# Patient Record
Sex: FEMALE | Race: OTHER | Employment: UNEMPLOYED | URBAN - METROPOLITAN AREA
[De-identification: names, ages, dates, MRNs, and addresses within clinical notes are randomized per-mention and may not be internally consistent; named-entity substitution may affect disease eponyms.]

---

## 2017-01-20 ENCOUNTER — OFFICE VISIT (OUTPATIENT)
Dept: OBGYN CLINIC | Facility: CLINIC | Age: 42
End: 2017-01-20

## 2017-01-20 VITALS
HEIGHT: 60.75 IN | SYSTOLIC BLOOD PRESSURE: 113 MMHG | WEIGHT: 172 LBS | BODY MASS INDEX: 32.9 KG/M2 | HEART RATE: 66 BPM | DIASTOLIC BLOOD PRESSURE: 75 MMHG

## 2017-01-20 DIAGNOSIS — Z01.419 ENCOUNTER FOR GYNECOLOGICAL EXAMINATION WITHOUT ABNORMAL FINDING: Primary | ICD-10-CM

## 2017-01-20 PROCEDURE — 99395 PREV VISIT EST AGE 18-39: CPT | Performed by: OBSTETRICS & GYNECOLOGY

## 2017-01-20 NOTE — PROGRESS NOTES
HPI:   Etelvina Moore is a 46year old female who presents for an annual/pap.        Wt Readings from Last 6 Encounters:  01/20/17 : 172 lb (78.019 kg)  08/12/16 : 175 lb (79.379 kg)  07/14/16 : 178 lb (80.74 kg)  06/16/16 : 178 lb (80.74 kg)  02/01/16 : 174 • Decorative tattoo    • Anemia    • Esophageal reflux 2013          Past Surgical History    HERNIA SURGERY  11/27/13    Comment hiatal hernia     EXCIS PRIMARY GANGLION WRIST      FOREARM/WRIST SURGERY UNLISTED      Comment left wrist surgery    C-SECT tenderness  BREAST: no dominant or suspicious mass  LUNGS: clear to auscultation  CARDIO: RRR without murmur  GI: good BS's,no masses, HSM or tenderness  :introitus is normal,scant discharge,cervix is pink,no adnexal masses or tenderness, PAP was done

## 2017-02-07 ENCOUNTER — TELEPHONE (OUTPATIENT)
Dept: OBGYN CLINIC | Facility: CLINIC | Age: 42
End: 2017-02-07

## 2017-02-07 NOTE — TELEPHONE ENCOUNTER
Patient informed of normal result, and provider instructions,  patient verbalized understanding.  DOUGLAS JOHNSON

## 2017-02-15 RX ORDER — CITALOPRAM 40 MG/1
TABLET ORAL
Qty: 30 TABLET | Refills: 3 | Status: SHIPPED | OUTPATIENT
Start: 2017-02-15 | End: 2017-03-02

## 2017-03-02 ENCOUNTER — OFFICE VISIT (OUTPATIENT)
Dept: INTERNAL MEDICINE CLINIC | Facility: CLINIC | Age: 42
End: 2017-03-02

## 2017-03-02 VITALS
WEIGHT: 175 LBS | BODY MASS INDEX: 33.47 KG/M2 | SYSTOLIC BLOOD PRESSURE: 116 MMHG | HEIGHT: 60.75 IN | TEMPERATURE: 98 F | DIASTOLIC BLOOD PRESSURE: 70 MMHG | HEART RATE: 56 BPM | OXYGEN SATURATION: 97 %

## 2017-03-02 DIAGNOSIS — D50.0 IRON DEFICIENCY ANEMIA DUE TO CHRONIC BLOOD LOSS: Primary | ICD-10-CM

## 2017-03-02 DIAGNOSIS — R63.5 WEIGHT GAIN: ICD-10-CM

## 2017-03-02 DIAGNOSIS — R35.0 INCREASED URINARY FREQUENCY: ICD-10-CM

## 2017-03-02 DIAGNOSIS — Z00.00 ADULT GENERAL MEDICAL EXAM: ICD-10-CM

## 2017-03-02 DIAGNOSIS — R31.9 HEMATURIA: ICD-10-CM

## 2017-03-02 LAB
ALBUMIN SERPL BCP-MCNC: 4.2 G/DL (ref 3.5–4.8)
ALBUMIN/GLOB SERPL: 1.7 {RATIO} (ref 1–2)
ALP SERPL-CCNC: 64 U/L (ref 32–100)
ALT SERPL-CCNC: 39 U/L (ref 14–54)
ANION GAP SERPL CALC-SCNC: 9 MMOL/L (ref 0–18)
APPEARANCE: CLEAR
AST SERPL-CCNC: 24 U/L (ref 15–41)
BASOPHILS # BLD: 0 K/UL (ref 0–0.2)
BASOPHILS NFR BLD: 1 %
BILIRUB SERPL-MCNC: 0.7 MG/DL (ref 0.3–1.2)
BILIRUB UR QL: NEGATIVE
BUN SERPL-MCNC: 18 MG/DL (ref 8–20)
BUN/CREAT SERPL: 25.7 (ref 10–20)
CALCIUM SERPL-MCNC: 9.8 MG/DL (ref 8.5–10.5)
CHLORIDE SERPL-SCNC: 103 MMOL/L (ref 95–110)
CHOLEST SERPL-MCNC: 185 MG/DL (ref 110–200)
CLARITY UR: CLEAR
CO2 SERPL-SCNC: 26 MMOL/L (ref 22–32)
COLOR UR: YELLOW
CREAT SERPL-MCNC: 0.7 MG/DL (ref 0.5–1.5)
EOSINOPHIL # BLD: 0 K/UL (ref 0–0.7)
EOSINOPHIL NFR BLD: 1 %
ERYTHROCYTE [DISTWIDTH] IN BLOOD BY AUTOMATED COUNT: 14.4 % (ref 11–15)
FERRITIN SERPL IA-MCNC: 33 NG/ML (ref 11–307)
GLOBULIN PLAS-MCNC: 2.5 G/DL (ref 2.5–3.7)
GLUCOSE SERPL-MCNC: 79 MG/DL (ref 70–99)
GLUCOSE UR-MCNC: NEGATIVE MG/DL
HCT VFR BLD AUTO: 40.9 % (ref 35–48)
HDLC SERPL-MCNC: 48 MG/DL
HGB BLD-MCNC: 13.3 G/DL (ref 12–16)
HGB UR QL STRIP.AUTO: NEGATIVE
IRON SATN MFR SERPL: 16 % (ref 15–50)
IRON SERPL-MCNC: 55 MCG/DL (ref 28–170)
KETONES UR-MCNC: NEGATIVE MG/DL
LDLC SERPL CALC-MCNC: 109 MG/DL (ref 0–99)
LEUKOCYTE ESTERASE UR QL STRIP.AUTO: NEGATIVE
LYMPHOCYTES # BLD: 1.8 K/UL (ref 1–4)
LYMPHOCYTES NFR BLD: 24 %
MCH RBC QN AUTO: 28.6 PG (ref 27–32)
MCHC RBC AUTO-ENTMCNC: 32.6 G/DL (ref 32–37)
MCV RBC AUTO: 87.6 FL (ref 80–100)
MONOCYTES # BLD: 0.5 K/UL (ref 0–1)
MONOCYTES NFR BLD: 6 %
MULTISTIX LOT#: NORMAL NUMERIC
NEUTROPHILS # BLD AUTO: 5.2 K/UL (ref 1.8–7.7)
NEUTROPHILS NFR BLD: 68 %
NITRITE UR QL STRIP.AUTO: NEGATIVE
NONHDLC SERPL-MCNC: 137 MG/DL
OSMOLALITY UR CALC.SUM OF ELEC: 287 MOSM/KG (ref 275–295)
PH UR: 7 [PH] (ref 5–8)
PH, URINE: 7 (ref 4.5–8)
PLATELET # BLD AUTO: 226 K/UL (ref 140–400)
PMV BLD AUTO: 9.1 FL (ref 7.4–10.3)
POTASSIUM SERPL-SCNC: 4.5 MMOL/L (ref 3.3–5.1)
PROT SERPL-MCNC: 6.7 G/DL (ref 5.9–8.4)
PROT UR-MCNC: NEGATIVE MG/DL
RBC # BLD AUTO: 4.67 M/UL (ref 3.7–5.4)
SODIUM SERPL-SCNC: 138 MMOL/L (ref 136–144)
SP GR UR STRIP: 1.02 (ref 1–1.03)
SPECIFIC GRAVITY: 1.02 (ref 1–1.03)
TIBC SERPL-MCNC: 347 MCG/DL (ref 228–428)
TRANSFERRIN SERPL-MCNC: 263 MG/DL (ref 192–382)
TRIGL SERPL-MCNC: 138 MG/DL (ref 1–149)
URINE-COLOR: YELLOW
UROBILINOGEN UR STRIP-ACNC: <2
UROBILINOGEN,SEMI-QN: 0.2 MG/DL (ref 0–1.9)
VIT B12 SERPL-MCNC: 572 PG/ML (ref 181–914)
VIT C UR-MCNC: NEGATIVE MG/DL
WBC # BLD AUTO: 7.6 K/UL (ref 4–11)

## 2017-03-02 PROCEDURE — 81002 URINALYSIS NONAUTO W/O SCOPE: CPT | Performed by: INTERNAL MEDICINE

## 2017-03-02 PROCEDURE — 36415 COLL VENOUS BLD VENIPUNCTURE: CPT | Performed by: INTERNAL MEDICINE

## 2017-03-02 PROCEDURE — 99395 PREV VISIT EST AGE 18-39: CPT | Performed by: INTERNAL MEDICINE

## 2017-03-02 RX ORDER — CITALOPRAM 40 MG/1
TABLET ORAL
Qty: 30 TABLET | Refills: 3 | Status: SHIPPED | OUTPATIENT
Start: 2017-03-02 | End: 2017-10-24

## 2017-03-02 NOTE — PATIENT INSTRUCTIONS
ASSESSMENT/PLAN:   Iron deficiency anemia due to chronic blood loss  (primary encounter diagnosis) Check blood. Adult general medical exam Check urine and blood. Increased urinary frequency Check blood and urine and U/S.      Weight gain discussed w

## 2017-03-02 NOTE — PROGRESS NOTES
HPI:    Patient ID: Jovanni Ordonez is a 46year old female.     HPI  Jovanni Ordonez is a 46year old female who presents for a complete physical exam.   HPI:   Patient presents with:  Physical     REASON FOR VISIT:    Jovanni Ordonez is a 46year old female who pr two weeks)?: Not at all    PHQ-2 SCORE: 0        PREVENTATIVE SERVICES  INDICATIONS AND SCHEDULE Recommendation Internal Lab or Procedure External Lab or Procedure   Breast Cancer Screening   Every 2 yrs age 54-69 There are no preventive care reminders to Outpatient Prescriptions:  Mirabegron ER 25 MG Oral Tablet 24 Hr Take 25 mg by mouth daily. Disp:  Rfl:    Citalopram Hydrobromide 40 MG Oral Tab TAKE 1 TABLET (40 MG TOTAL) BY MOUTH NIGHTLY.  Disp: 30 tablet Rfl: 3   MetFORMIN HCl 500 MG Oral Tab 1/2 tab e VACCINATIONS - Influenza, Pneumococcal, Zoster, Tetanus     Immunization History   Administered Date(s) Administered   • Influenza 10/03/2013       Influenza Annually   Pneumococcal if high risk   Td/Tdap once then every 10 years   HPV Females 11-26: 3 dos light-headedness, numbness and headaches. Psychiatric/Behavioral: Negative for suicidal ideas, hallucinations, behavioral problems, confusion, sleep disturbance, self-injury, dysphoric mood, decreased concentration and agitation.  The patient is not nervo mucous membranes are normal. Mucous membranes are not pale, not dry and not cyanotic. No oral lesions. No trismus in the jaw. No dental abscesses, uvula swelling or lacerations.  No oropharyngeal exudate, posterior oropharyngeal edema, posterior oropharynge distension, no fluid wave, no ascites and no mass. There is no hepatosplenomegaly, splenomegaly or hepatomegaly. There is no tenderness. There is no rigidity, no rebound, no guarding and no CVA tenderness. Musculoskeletal: She exhibits no edema.    Funmilayo Harhome [E]  Urinalysis, Routine [E]  POC Urinalysis, Manual Dip without microscopy [11129]  Urine Culture, Routine [E]    Meds This Visit:    Signed Prescriptions Disp Refills    Citalopram Hydrobromide 40 MG Oral Tab 30 tablet 3      Sig: TAKE 1 TABLET (40 MG TO

## 2017-03-06 NOTE — PROGRESS NOTES
Quick Note:    CBC Normal (white blood cells and red blood cells and platelets), iron levels and iron storage is good. B 12 is good. CMP Normal (electrolytes, sugar, kidney and liver functions),   Lipid (choilesterol) is better,   U/A and UCx.  Is Nl.  __

## 2017-03-15 ENCOUNTER — HOSPITAL ENCOUNTER (OUTPATIENT)
Dept: ULTRASOUND IMAGING | Facility: HOSPITAL | Age: 42
Discharge: HOME | End: 2017-03-15
Attending: INTERNAL MEDICINE
Payer: COMMERCIAL

## 2017-03-15 DIAGNOSIS — R35.0 INCREASED URINARY FREQUENCY: ICD-10-CM

## 2017-03-15 PROCEDURE — 76770 US EXAM ABDO BACK WALL COMP: CPT

## 2017-05-01 ENCOUNTER — TELEPHONE (OUTPATIENT)
Dept: OBGYN CLINIC | Facility: CLINIC | Age: 42
End: 2017-05-01

## 2017-05-01 RX ORDER — METRONIDAZOLE 7.5 MG/G
1 GEL VAGINAL 2 TIMES DAILY
Qty: 70 G | Refills: 0 | Status: SHIPPED | OUTPATIENT
Start: 2017-05-01 | End: 2017-05-06

## 2017-05-01 NOTE — TELEPHONE ENCOUNTER
Pt voices having yellowish, vaginal, discharge \"on and off\" for the past couple of weeks. No odor. No vaginal irritation or itching. Pt has tried over the counter meds, with no relief from symptom. Please advise.

## 2017-05-01 NOTE — TELEPHONE ENCOUNTER
Pt called. No answer, but permission received per pt under FYI that a message may be left regarding any treatments. Left message that asj ordered Metrogel 0.75% per vagina twice a day for 5 days.  Also, left message that pt is to make appointment with offic

## 2017-05-01 NOTE — TELEPHONE ENCOUNTER
Yellowing vaginal discharge more c/w bacterial vaginosis,  May send in rx for Metrogel 0.75%, disp 1 tube, sig one application per vagina bid for 5 days, if sx still there in 5 days, then make appt in office.

## 2017-05-01 NOTE — TELEPHONE ENCOUNTER
Per the pt she has had a yeast infection off and on for about a month. The pt states that over the counter medication is not helping, and she is requesting that something be sent in. Please advise.

## 2017-05-02 ENCOUNTER — TELEPHONE (OUTPATIENT)
Dept: INTERNAL MEDICINE CLINIC | Facility: CLINIC | Age: 42
End: 2017-05-02

## 2017-05-11 ENCOUNTER — TELEPHONE (OUTPATIENT)
Dept: INTERNAL MEDICINE CLINIC | Facility: CLINIC | Age: 42
End: 2017-05-11

## 2017-05-11 ENCOUNTER — OFFICE VISIT (OUTPATIENT)
Dept: INTERNAL MEDICINE CLINIC | Facility: CLINIC | Age: 42
End: 2017-05-11

## 2017-05-11 VITALS
WEIGHT: 176.63 LBS | HEART RATE: 64 BPM | HEIGHT: 61 IN | DIASTOLIC BLOOD PRESSURE: 74 MMHG | TEMPERATURE: 98 F | SYSTOLIC BLOOD PRESSURE: 104 MMHG | OXYGEN SATURATION: 98 % | BODY MASS INDEX: 33.35 KG/M2

## 2017-05-11 DIAGNOSIS — R06.02 SOB (SHORTNESS OF BREATH): ICD-10-CM

## 2017-05-11 DIAGNOSIS — E78.2 MIXED HYPERLIPIDEMIA: ICD-10-CM

## 2017-05-11 DIAGNOSIS — R00.2 PALPITATIONS: ICD-10-CM

## 2017-05-11 DIAGNOSIS — D50.0 IRON DEFICIENCY ANEMIA DUE TO CHRONIC BLOOD LOSS: Primary | ICD-10-CM

## 2017-05-11 DIAGNOSIS — R63.5 WEIGHT GAIN: ICD-10-CM

## 2017-05-11 PROCEDURE — 99212 OFFICE O/P EST SF 10 MIN: CPT | Performed by: INTERNAL MEDICINE

## 2017-05-11 PROCEDURE — 36415 COLL VENOUS BLD VENIPUNCTURE: CPT | Performed by: INTERNAL MEDICINE

## 2017-05-11 PROCEDURE — 99214 OFFICE O/P EST MOD 30 MIN: CPT | Performed by: INTERNAL MEDICINE

## 2017-05-11 NOTE — PROGRESS NOTES
HPI:    Patient ID: Pasquale Ashton is a 46year old female.     Anemia  Pertinent negatives include no abdominal pain, chest pain, chills, congestion, coughing, diaphoresis, fatigue, fever, headaches, myalgias, nausea, numbness, rash, sore throat, vomiting or 104/74  03/02/17 : 116/70  01/20/17 : 113/75    Labs:     Lab Results  Component Value Date/Time   GLU 79 03/02/2017 11:15 AM    03/02/2017 11:15 AM   K 4.5 03/02/2017 11:15 AM    03/02/2017 11:15 AM   CO2 26 03/02/2017 11:15 AM   CREATSERUM 0. hallucinations, behavioral problems, confusion, sleep disturbance, self-injury, dysphoric mood, decreased concentration and agitation. The patient is not nervous/anxious and is not hyperactive. All other systems reviewed and are negative.           Baljit Smith Smokeless Status: Never Used                        Alcohol Use: No                 PHYSICAL EXAM:    Physical Exam   Constitutional: She is oriented to person, place, and time. She appears well-developed and well-nourished. No distress.    HENT: pulses and normal pulses. Pulses:       Carotid pulses are 2+ on the right side, and 2+ on the left side. Radial pulses are 2+ on the right side, and 2+ on the left side.         Dorsalis pedis pulses are 2+ on the right side, and 2+ on the left si bariatrics. R ankle pain after Sx.        Orders Placed This Encounter  CBC W Differential W Platelet [E]  Comp Metabolic Panel (14) [E]  Ferritin [E]  TSH W Reflex To Free T4 [E]    Meds This Visit:  No prescriptions requested or ordered in this encoun

## 2017-05-11 NOTE — PATIENT INSTRUCTIONS
ASSESSMENT/PLAN:   Iron deficiency anemia due to chronic blood loss  (primary encounter diagnosis) Check blood. Mixed hyperlipidemia Check blood. Sob (shortness of breath) ? Etiology. Check PFT's.      Palpitations Check records of cardiologist. Wendy Mariano

## 2017-05-12 NOTE — TELEPHONE ENCOUNTER
Called patient, left voice message for patient to return call. PFT ordered. Please give patient information from order to call to schedule testing.

## 2017-05-15 ENCOUNTER — OFFICE VISIT (OUTPATIENT)
Dept: OBGYN CLINIC | Facility: CLINIC | Age: 42
End: 2017-05-15

## 2017-05-15 ENCOUNTER — TELEPHONE (OUTPATIENT)
Dept: OBGYN CLINIC | Facility: CLINIC | Age: 42
End: 2017-05-15

## 2017-05-15 VITALS — SYSTOLIC BLOOD PRESSURE: 109 MMHG | HEART RATE: 60 BPM | DIASTOLIC BLOOD PRESSURE: 71 MMHG

## 2017-05-15 DIAGNOSIS — N76.0 VAGINITIS AND VULVOVAGINITIS: ICD-10-CM

## 2017-05-15 DIAGNOSIS — N89.8 VAGINAL DISCHARGE: Primary | ICD-10-CM

## 2017-05-15 PROCEDURE — 99213 OFFICE O/P EST LOW 20 MIN: CPT | Performed by: OBSTETRICS & GYNECOLOGY

## 2017-05-15 RX ORDER — FLUCONAZOLE 150 MG/1
150 TABLET ORAL ONCE
Qty: 4 TABLET | Refills: 1 | Status: SHIPPED | OUTPATIENT
Start: 2017-05-15 | End: 2017-05-15

## 2017-05-15 NOTE — TELEPHONE ENCOUNTER
Pt saw Dr. Eder Weiss and is at pharmacy now; verified correct pharmacy ans states nothing is there. She is req to willy w nurse.

## 2017-05-15 NOTE — PROGRESS NOTES
HPI:   Karyn Espinal is a 46year old female who presents for a hx of vaginal discharge and irritation.   Pt    Wt Readings from Last 6 Encounters:  05/11/17 : 176 lb 9.6 oz (80.105 kg)  03/02/17 : 175 lb (79.379 kg)  01/20/17 : 172 lb (78.019 kg)  08/12/16 History    HERNIA SURGERY  13    Comment hiatal hernia     EXCIS PRIMARY GANGLION WRIST      FOREARM/WRIST SURGERY UNLISTED      Comment left wrist surgery          Comment x 3    OTHER SURGICAL HISTORY      Comment Lasik    OTHER SURGICAL H auscultation  CARDIO: RRR without murmur  GI: good BS's,no masses, HSM or tenderness  :introitus is normal,scant discharge,cervix is pink,no adnexal masses or tenderness, PAP was done     MUSCULOSKELETAL: back is not tender,FROM of the back  EXTREMITIES:

## 2017-05-15 NOTE — TELEPHONE ENCOUNTER
Pt at pharmacy now. Pharmacy did not received order for Diflucan. ASJ informed and order was placed for Diflucan and pt informed she is to take Monistat OTC.

## 2017-05-16 NOTE — PROGRESS NOTES
Quick Note:    CBC Normal (white blood cells and red blood cells and platelets),   CMP Normal (electrolytes, sugar, kidney and liver functions),   Thyroid is good. Iron storage is good.     ______

## 2017-05-17 ENCOUNTER — HOSPITAL ENCOUNTER (OUTPATIENT)
Dept: RESPIRATORY THERAPY | Facility: HOSPITAL | Age: 42
Discharge: HOME | End: 2017-05-17
Attending: INTERNAL MEDICINE
Payer: COMMERCIAL

## 2017-05-17 DIAGNOSIS — R06.02 SOB (SHORTNESS OF BREATH): Primary | ICD-10-CM

## 2017-05-17 PROCEDURE — 94060 EVALUATION OF WHEEZING: CPT | Performed by: INTERNAL MEDICINE

## 2017-05-17 PROCEDURE — 94729 DIFFUSING CAPACITY: CPT | Performed by: INTERNAL MEDICINE

## 2017-05-17 PROCEDURE — 94726 PLETHYSMOGRAPHY LUNG VOLUMES: CPT | Performed by: INTERNAL MEDICINE

## 2017-05-25 NOTE — PROCEDURES
[facility]    Patient's Name Brisa Mckenna MRN [id number]    1975 Pulmonologist Nathalie Durant MD   [facility] RESPIRATORY THERAPY PCP Yi Ulrich.  Melissa Mg MD     IMPRESSION:    The PFTs are Abnormal.    The spirometry a

## 2017-05-25 NOTE — ADDENDUM NOTE
Encounter addended by: Lyn Luque MD on: 5/25/2017  1:25 PM<BR>     Documentation filed: Charges VN, Notes Section

## 2017-05-30 ENCOUNTER — TELEPHONE (OUTPATIENT)
Dept: OBGYN CLINIC | Facility: CLINIC | Age: 42
End: 2017-05-30

## 2017-05-30 ENCOUNTER — OFFICE VISIT (OUTPATIENT)
Dept: INTERNAL MEDICINE CLINIC | Facility: CLINIC | Age: 42
End: 2017-05-30

## 2017-05-30 ENCOUNTER — TELEPHONE (OUTPATIENT)
Dept: INTERNAL MEDICINE CLINIC | Facility: CLINIC | Age: 42
End: 2017-05-30

## 2017-05-30 VITALS
HEIGHT: 61 IN | WEIGHT: 177.81 LBS | HEART RATE: 64 BPM | DIASTOLIC BLOOD PRESSURE: 70 MMHG | OXYGEN SATURATION: 97 % | TEMPERATURE: 98 F | SYSTOLIC BLOOD PRESSURE: 108 MMHG | BODY MASS INDEX: 33.57 KG/M2

## 2017-05-30 DIAGNOSIS — E78.2 MIXED HYPERLIPIDEMIA: ICD-10-CM

## 2017-05-30 DIAGNOSIS — R06.00 DOE (DYSPNEA ON EXERTION): ICD-10-CM

## 2017-05-30 DIAGNOSIS — L02.91 ABSCESS: ICD-10-CM

## 2017-05-30 DIAGNOSIS — D50.0 IRON DEFICIENCY ANEMIA DUE TO CHRONIC BLOOD LOSS: Primary | ICD-10-CM

## 2017-05-30 PROCEDURE — 99214 OFFICE O/P EST MOD 30 MIN: CPT | Performed by: INTERNAL MEDICINE

## 2017-05-30 PROCEDURE — 99212 OFFICE O/P EST SF 10 MIN: CPT | Performed by: INTERNAL MEDICINE

## 2017-05-30 RX ORDER — CLINDAMYCIN HYDROCHLORIDE 300 MG/1
300 CAPSULE ORAL 4 TIMES DAILY
Qty: 40 CAPSULE | Refills: 0 | Status: SHIPPED | OUTPATIENT
Start: 2017-05-30 | End: 2017-06-09

## 2017-05-30 NOTE — TELEPHONE ENCOUNTER
----- Message from Leonarda Interiano MD sent at 5/25/2017 11:45 AM CDT -----  Please inform,  Candida yeast noted on screen, pt may have already treated but if symptomatic,  May call in diflucan 150 mg, disp 2, sig one tab po q24 hr X 2, or pt may use mo

## 2017-05-30 NOTE — TELEPHONE ENCOUNTER
The is returning a nurse's call. The pt state that a detailed v/m can be left at [phone]. Please advise.

## 2017-05-30 NOTE — TELEPHONE ENCOUNTER
Pt called and informed that her her test on 5/1517  came back positive for yeast. Pt verbalize she is aware of this and asj already ordered medication for her and that she has no symptoms of yeast at this time.

## 2017-05-31 NOTE — PATIENT INSTRUCTIONS
ASSESSMENT/PLAN:   Iron deficiency anemia due to chronic blood loss  (primary encounter diagnosis) Check blood. Mixed hyperlipidemia Stable 3-17. Abscess Start antibiotics ASAP and take as directed with food til done.  Take probiotics while on antib

## 2017-05-31 NOTE — PROGRESS NOTES
HPI:    Patient ID: Brisa Mckenna is a 46year old female. Rash  This is a new problem. The current episode started 1 to 4 weeks ago. The problem is unchanged. Location: L breast.  noticed. The rash is characterized by redness and swelling.  She w HENT: Negative for congestion, dental problem, drooling, ear discharge, ear pain, facial swelling, hearing loss, mouth sores, nosebleeds, postnasal drip, rhinorrhea, sinus pressure, sneezing, sore throat, tinnitus, trouble swallowing and voice change.     E LLQ. Colonoscopy : NL (10-14-15) except hemorrhoids.     • Amenorrhea    • Anxiety    • Urinary incontinence    • Decorative tattoo    • Anemia    • Esophageal reflux 2013          Past Surgical History    HERNIA SURGERY  11/27/13    Comment hiatal hernia Pulmonary/Chest: Effort normal and breath sounds normal. No accessory muscle usage. No apnea, no tachypnea and no bradypnea. No respiratory distress. She has no decreased breath sounds. She has no wheezes. She has no rhonchi. She has no rales.  She exhibits Abscess Start antibiotics ASAP and take as directed with food til done. Take probiotics while on antibiotics if can to prevent yeast infections. Stop cholesterol medicines while on antibiotics. Increase fluids.  Use extra form of protection while on antibio

## 2017-06-06 ENCOUNTER — OFFICE VISIT (OUTPATIENT)
Dept: INTERNAL MEDICINE CLINIC | Facility: CLINIC | Age: 42
End: 2017-06-06

## 2017-06-06 VITALS
DIASTOLIC BLOOD PRESSURE: 66 MMHG | WEIGHT: 176 LBS | HEIGHT: 61 IN | HEART RATE: 72 BPM | SYSTOLIC BLOOD PRESSURE: 108 MMHG | OXYGEN SATURATION: 98 % | TEMPERATURE: 99 F | BODY MASS INDEX: 33.23 KG/M2

## 2017-06-06 DIAGNOSIS — E78.2 MIXED HYPERLIPIDEMIA: ICD-10-CM

## 2017-06-06 DIAGNOSIS — R22.2 MASS IN CHEST: ICD-10-CM

## 2017-06-06 DIAGNOSIS — L02.91 ABSCESS: ICD-10-CM

## 2017-06-06 DIAGNOSIS — D50.0 IRON DEFICIENCY ANEMIA DUE TO CHRONIC BLOOD LOSS: Primary | ICD-10-CM

## 2017-06-06 DIAGNOSIS — M54.6 ACUTE BILATERAL THORACIC BACK PAIN: ICD-10-CM

## 2017-06-06 PROCEDURE — 99214 OFFICE O/P EST MOD 30 MIN: CPT | Performed by: INTERNAL MEDICINE

## 2017-06-06 PROCEDURE — 99212 OFFICE O/P EST SF 10 MIN: CPT | Performed by: INTERNAL MEDICINE

## 2017-06-06 RX ORDER — CYCLOBENZAPRINE HCL 10 MG
10 TABLET ORAL NIGHTLY
Qty: 7 TABLET | Refills: 0 | Status: SHIPPED | OUTPATIENT
Start: 2017-06-06 | End: 2017-06-22 | Stop reason: ALTCHOICE

## 2017-06-06 NOTE — PATIENT INSTRUCTIONS
ASSESSMENT/PLAN:   Iron deficiency anemia due to chronic blood loss  (primary encounter diagnosis)    Mixed hyperlipidemia    Abscess Improving but not too quickly. Check mammogram. Continue self breast exam every month.      Acute bilateral thoracic back p

## 2017-06-06 NOTE — PROGRESS NOTES
HPI:    Patient ID: Rosanne Salvador is a 46year old female. Back Pain  This is a new problem. The current episode started 1 to 4 weeks ago. The problem occurs constantly. The problem has been gradually worsening since onset.  The pain is present in the tho Clindamycin HCl 300 MG Oral Cap Take 1 capsule (300 mg total) by mouth 4 (four) times daily. Disp: 40 capsule Rfl: 0   Mirabegron ER 25 MG Oral Tablet 24 Hr Take 25 mg by mouth daily.  Disp:  Rfl:    Citalopram Hydrobromide 40 MG Oral Tab TAKE 1 TABLET (40 Constitutional: She is oriented to person, place, and time. She appears well-developed and well-nourished. No distress. Neck: Trachea normal and phonation normal. No thyroid mass and no thyromegaly present.    Cardiovascular: Normal rate, regular rhythm, Thoracic back: She exhibits decreased range of motion, pain and spasm. She exhibits no tenderness, no bony tenderness, no swelling, no edema, no deformity, no laceration and normal pulse.    Lymphadenopathy:        Head (right side): No submental, no s Signed Prescriptions Disp Refills    Diclofenac Sodium 50 MG Oral Tab EC 14 tablet 0      Sig: Take 1 tablet (50 mg total) by mouth 2 (two) times daily.       Cyclobenzaprine HCl 10 MG Oral Tab 7 tablet 0      Sig: Take 1 tablet (10 mg total) by mouth night

## 2017-06-08 ENCOUNTER — HOSPITAL ENCOUNTER (OUTPATIENT)
Dept: ULTRASOUND IMAGING | Facility: HOSPITAL | Age: 42
Discharge: HOME | End: 2017-06-08
Attending: ORTHOPAEDIC SURGERY
Payer: COMMERCIAL

## 2017-06-08 DIAGNOSIS — M76.71 PERONEAL TENDINITIS, RIGHT LEG: ICD-10-CM

## 2017-06-08 PROCEDURE — 76882 US LMTD JT/FCL EVL NVASC XTR: CPT | Performed by: ORTHOPAEDIC SURGERY

## 2017-06-13 ENCOUNTER — HOSPITAL ENCOUNTER (OUTPATIENT)
Dept: ULTRASOUND IMAGING | Facility: HOSPITAL | Age: 42
Discharge: HOME | End: 2017-06-13
Attending: INTERNAL MEDICINE
Payer: COMMERCIAL

## 2017-06-13 ENCOUNTER — HOSPITAL ENCOUNTER (OUTPATIENT)
Dept: MAMMOGRAPHY | Facility: HOSPITAL | Age: 42
Discharge: HOME | End: 2017-06-13
Attending: INTERNAL MEDICINE
Payer: COMMERCIAL

## 2017-06-13 DIAGNOSIS — R22.2 MASS IN CHEST: ICD-10-CM

## 2017-06-13 PROCEDURE — 76642 ULTRASOUND BREAST LIMITED: CPT | Performed by: INTERNAL MEDICINE

## 2017-06-13 PROCEDURE — 77066 DX MAMMO INCL CAD BI: CPT | Performed by: INTERNAL MEDICINE

## 2017-06-14 ENCOUNTER — TELEPHONE (OUTPATIENT)
Dept: INTERNAL MEDICINE CLINIC | Facility: CLINIC | Age: 42
End: 2017-06-14

## 2017-06-14 NOTE — TELEPHONE ENCOUNTER
Received call from patient regarding lesion to breast. Per patient it looks bigger, with redness, pain, denies any drainage. Asking if she needs to see derm? Still taking antibiotic. Please advise.

## 2017-06-15 NOTE — TELEPHONE ENCOUNTER
Spoke with patient, informed to see Dr. Cr Ortiz regarding lesion to breast. Information given, instructed to call and make appointment. Patient verbalized understanding.

## 2017-06-22 ENCOUNTER — OFFICE VISIT (OUTPATIENT)
Dept: INTERNAL MEDICINE CLINIC | Facility: CLINIC | Age: 42
End: 2017-06-22

## 2017-06-22 ENCOUNTER — HOSPITAL ENCOUNTER (OUTPATIENT)
Dept: GENERAL RADIOLOGY | Facility: HOSPITAL | Age: 42
Discharge: HOME | End: 2017-06-22
Attending: INTERNAL MEDICINE
Payer: COMMERCIAL

## 2017-06-22 ENCOUNTER — TELEPHONE (OUTPATIENT)
Dept: PULMONOLOGY | Facility: CLINIC | Age: 42
End: 2017-06-22

## 2017-06-22 VITALS
BODY MASS INDEX: 33.04 KG/M2 | OXYGEN SATURATION: 96 % | DIASTOLIC BLOOD PRESSURE: 65 MMHG | SYSTOLIC BLOOD PRESSURE: 105 MMHG | HEART RATE: 79 BPM | HEIGHT: 61 IN | TEMPERATURE: 98 F | WEIGHT: 175 LBS

## 2017-06-22 DIAGNOSIS — G89.29 CHRONIC MIDLINE THORACIC BACK PAIN: ICD-10-CM

## 2017-06-22 DIAGNOSIS — M54.6 CHRONIC MIDLINE THORACIC BACK PAIN: ICD-10-CM

## 2017-06-22 DIAGNOSIS — E78.2 MIXED HYPERLIPIDEMIA: ICD-10-CM

## 2017-06-22 DIAGNOSIS — D50.0 IRON DEFICIENCY ANEMIA DUE TO CHRONIC BLOOD LOSS: Primary | ICD-10-CM

## 2017-06-22 DIAGNOSIS — R06.00 DOE (DYSPNEA ON EXERTION): ICD-10-CM

## 2017-06-22 PROBLEM — R06.09 DOE (DYSPNEA ON EXERTION): Status: ACTIVE | Noted: 2017-06-22

## 2017-06-22 PROCEDURE — 99212 OFFICE O/P EST SF 10 MIN: CPT | Performed by: INTERNAL MEDICINE

## 2017-06-22 PROCEDURE — 72072 X-RAY EXAM THORAC SPINE 3VWS: CPT | Performed by: INTERNAL MEDICINE

## 2017-06-22 PROCEDURE — 99214 OFFICE O/P EST MOD 30 MIN: CPT | Performed by: INTERNAL MEDICINE

## 2017-06-22 RX ORDER — METHYLPREDNISOLONE 4 MG/1
TABLET ORAL
Qty: 1 KIT | Refills: 0 | Status: SHIPPED | OUTPATIENT
Start: 2017-06-22 | End: 2017-07-05 | Stop reason: ALTCHOICE

## 2017-06-22 RX ORDER — MONTELUKAST SODIUM 10 MG/1
10 TABLET ORAL NIGHTLY
Qty: 30 TABLET | Refills: 3 | Status: SHIPPED | OUTPATIENT
Start: 2017-06-22 | End: 2017-07-18 | Stop reason: ALTCHOICE

## 2017-06-22 RX ORDER — OMEPRAZOLE 40 MG/1
40 CAPSULE, DELAYED RELEASE ORAL DAILY
Qty: 30 CAPSULE | Refills: 0 | Status: SHIPPED | OUTPATIENT
Start: 2017-06-22 | End: 2017-07-18 | Stop reason: ALTCHOICE

## 2017-06-22 NOTE — TELEPHONE ENCOUNTER
Dr. Stevie Moreno are you will to double book pt before 7/5? New pt SOB referred by Dr. Lizet Medina.

## 2017-06-22 NOTE — PROGRESS NOTES
HPI:    Patient ID: Roseline Richardson is a 46year old female. Low Back Pain  This is a chronic problem. The current episode started more than 1 month ago. The problem occurs constantly. The problem is unchanged. The pain is present in the thoracic spine.  Harrison Pimentel weight loss, diaphoresis, activity change, appetite change, fatigue and unexpected weight change. Respiratory: Positive for shortness of breath. Negative for apnea, cough, choking, chest tightness, wheezing and stridor.     Cardiovascular: Negative for ch except hemorrhoids.     • Amenorrhea    • Anxiety    • Urinary incontinence    • Decorative tattoo    • Anemia    • Esophageal reflux 2013          Past Surgical History    HERNIA SURGERY  11/27/13    Comment hiatal hernia     EXCIS PRIMARY GANGLION WRIST effusion. No hemotympanum. No decreased hearing is noted. Nose: Nose normal. No mucosal edema, rhinorrhea, nose lacerations, sinus tenderness, nasal deformity or nasal septal hematoma. No epistaxis. No foreign bodies.  Right sinus exhibits no maxillary s spasm, normal pulse and normal strength. Thoracic back: She exhibits decreased range of motion, tenderness, pain and spasm. She exhibits no bony tenderness, no swelling, no edema, no deformity, no laceration and normal pulse.    Lymphadenopathy: abnormal   Lateral Bend Left: abnormal   Rotation Right: abnormal   Rotation Left: abnormal     Tests   Straight leg raise right: positive  Straight leg raise left: negative                 ASSESSMENT/PLAN:   Iron deficiency anemia due to chronic blood los

## 2017-06-22 NOTE — PATIENT INSTRUCTIONS
ASSESSMENT/PLAN:   Iron deficiency anemia due to chronic blood loss  (primary encounter diagnosis) Stable. Mixed hyperlipidemia Stable. Boss (dyspnea on exertion) F/U pulmonary. Try medrol with food. Take omperazole 40 mg predinner.  Discussed with smiley

## 2017-06-27 ENCOUNTER — OFFICE VISIT (OUTPATIENT)
Dept: SURGERY | Facility: CLINIC | Age: 42
End: 2017-06-27

## 2017-06-27 VITALS
SYSTOLIC BLOOD PRESSURE: 130 MMHG | TEMPERATURE: 98 F | WEIGHT: 176 LBS | BODY MASS INDEX: 33.23 KG/M2 | DIASTOLIC BLOOD PRESSURE: 76 MMHG | RESPIRATION RATE: 18 BRPM | HEIGHT: 61 IN | OXYGEN SATURATION: 100 % | HEART RATE: 81 BPM

## 2017-06-27 DIAGNOSIS — N60.82 SEBACEOUS CYST OF SKIN OF LEFT BREAST: Primary | ICD-10-CM

## 2017-06-27 PROCEDURE — 99244 OFF/OP CNSLTJ NEW/EST MOD 40: CPT | Performed by: SURGERY

## 2017-06-29 NOTE — PROGRESS NOTES
Breast Surgery New Patient Consultation    This is the first visit for this 38year old woman, referred by Dr. Claudine Leyva, who presents for evaluation of Left breast mass.     History of Present Illness:   Ms. Paresh Klein is a 38year old woman who presents wi Comment: hiatal hernia   13: HERNIA SURGERY  No date: OTHER SURGICAL HISTORY      Comment: Lasik  No date: OTHER SURGICAL HISTORY      Comment: L wrist sx  2014: TUBAL LIGATION  2014: TUBAL LIGATION    Gynecological History:  Pt is a   Pt was 2 color blindness. The patient denies hearing loss, ringing in the ears, ear drainage, earaches, nasal congestion, nose bleeds, snoring, pain in mouth/throat, hoarseness, change in voice, facial trauma.     Respiratory:  The patient denies chronic cough, phle of despair. Endocrine: There is no history of poor/slow wound healing, weight loss/gain, fertility or hormone problems, cold intolerance, thyroid disease. Allergic/Immunologic:  There is no history of hives, hay fever, angioedema or anaphylaxis. Nodes:  The supraclavicular, axillary and cervical regions are free of significant lymphadenopathy. Back: There is no vertebral column tenderness. Skin: The skin appears normal. There are no suspicious appearing rashes or lesions. Extremities:  The

## 2017-07-05 ENCOUNTER — OFFICE VISIT (OUTPATIENT)
Dept: PULMONOLOGY | Facility: CLINIC | Age: 42
End: 2017-07-05

## 2017-07-05 VITALS
RESPIRATION RATE: 18 BRPM | HEIGHT: 61 IN | SYSTOLIC BLOOD PRESSURE: 110 MMHG | HEART RATE: 72 BPM | BODY MASS INDEX: 33.76 KG/M2 | DIASTOLIC BLOOD PRESSURE: 73 MMHG | WEIGHT: 178.81 LBS | OXYGEN SATURATION: 98 %

## 2017-07-05 DIAGNOSIS — R06.00 DYSPNEA, UNSPECIFIED TYPE: Primary | ICD-10-CM

## 2017-07-05 PROCEDURE — 99212 OFFICE O/P EST SF 10 MIN: CPT | Performed by: INTERNAL MEDICINE

## 2017-07-05 PROCEDURE — 99244 OFF/OP CNSLTJ NEW/EST MOD 40: CPT | Performed by: INTERNAL MEDICINE

## 2017-07-05 NOTE — PATIENT INSTRUCTIONS
A prior authorization is necessary for CT & Sleep Study, [facility] (p. #[phone]) will obtain a prior auth, and notify you when to proceed w/ scheduling of tests. Thank you.

## 2017-07-05 NOTE — PROGRESS NOTES
[facility] CLINIC [place] MEDICAL [facility], [facility], [facility]    Report of Consultation    Brisaangel Mckenna Patient Status:  Outpatient    1975 MRN [id number]   Location [address] [facility] UNLISTED      Comment: left wrist surgery  : HEMORRHOIDECTOMY      Comment: internal and anal skin tag removeal. Benign.    11/27/13: HERNIA SURGERY      Comment: hiatal hernia   11/27/13: HERNIA SURGERY  No date: OTHER SURGICAL HISTORY      Comment: 05/11/2017    05/11/2017   CO2 24 05/11/2017   GLU 77 05/11/2017   CA 9.2 05/11/2017   ALB 4.3 05/11/2017   ALKPHO 55 05/11/2017   TP 6.9 05/11/2017   AST 18 05/11/2017   ALT 24 05/11/2017   T4F 0.80 02/15/2013   TSH 1.50 05/11/2017   B12 572 03/02/2

## 2017-07-10 ENCOUNTER — HOSPITAL ENCOUNTER (OUTPATIENT)
Dept: CT IMAGING | Facility: HOSPITAL | Age: 42
Discharge: HOME | End: 2017-07-10
Attending: INTERNAL MEDICINE
Payer: COMMERCIAL

## 2017-07-10 DIAGNOSIS — R06.00 DYSPNEA, UNSPECIFIED TYPE: ICD-10-CM

## 2017-07-10 PROCEDURE — 71250 CT THORAX DX C-: CPT | Performed by: INTERNAL MEDICINE

## 2017-07-14 ENCOUNTER — TELEPHONE (OUTPATIENT)
Dept: PULMONOLOGY | Facility: CLINIC | Age: 42
End: 2017-07-14

## 2017-07-14 NOTE — TELEPHONE ENCOUNTER
I called the pt and discussed with her chest ct and PFts funding   Chest ct normal   She verbalized understanding

## 2017-07-14 NOTE — TELEPHONE ENCOUNTER
Pt calling for ct results. Pt aware that once her results are reviewed, she will receive a follow up call. Pt understood.

## 2017-07-20 ENCOUNTER — SURGERY (OUTPATIENT)
Age: 42
End: 2017-07-20

## 2017-07-20 ENCOUNTER — ANESTHESIA (OUTPATIENT)
Dept: SURGERY | Facility: HOSPITAL | Age: 42
End: 2017-07-20
Payer: COMMERCIAL

## 2017-07-20 ENCOUNTER — ANESTHESIA EVENT (OUTPATIENT)
Dept: SURGERY | Facility: HOSPITAL | Age: 42
End: 2017-07-20
Payer: COMMERCIAL

## 2017-07-20 ENCOUNTER — HOSPITAL ENCOUNTER (OUTPATIENT)
Facility: HOSPITAL | Age: 42
Setting detail: HOSPITAL OUTPATIENT SURGERY
Discharge: HOME | End: 2017-07-20
Attending: SURGERY | Admitting: SURGERY
Payer: COMMERCIAL

## 2017-07-20 VITALS
RESPIRATION RATE: 18 BRPM | SYSTOLIC BLOOD PRESSURE: 104 MMHG | HEART RATE: 62 BPM | HEIGHT: 61 IN | DIASTOLIC BLOOD PRESSURE: 62 MMHG | TEMPERATURE: 98 F | BODY MASS INDEX: 33.04 KG/M2 | OXYGEN SATURATION: 98 % | WEIGHT: 175 LBS

## 2017-07-20 DIAGNOSIS — N60.82 SEBACEOUS CYST OF SKIN OF LEFT BREAST: ICD-10-CM

## 2017-07-20 LAB — B-HCG UR QL: NEGATIVE

## 2017-07-20 PROCEDURE — 0HBU0ZZ EXCISION OF LEFT BREAST, OPEN APPROACH: ICD-10-PCS | Performed by: SURGERY

## 2017-07-20 PROCEDURE — 81025 URINE PREGNANCY TEST: CPT

## 2017-07-20 PROCEDURE — 88305 TISSUE EXAM BY PATHOLOGIST: CPT | Performed by: SURGERY

## 2017-07-20 PROCEDURE — 88304 TISSUE EXAM BY PATHOLOGIST: CPT | Performed by: SURGERY

## 2017-07-20 RX ORDER — HYDROCODONE BITARTRATE AND ACETAMINOPHEN 5; 325 MG/1; MG/1
2 TABLET ORAL AS NEEDED
Status: DISCONTINUED | OUTPATIENT
Start: 2017-07-20 | End: 2017-07-20

## 2017-07-20 RX ORDER — HYDROMORPHONE HYDROCHLORIDE 1 MG/ML
0.6 INJECTION, SOLUTION INTRAMUSCULAR; INTRAVENOUS; SUBCUTANEOUS EVERY 5 MIN PRN
Status: DISCONTINUED | OUTPATIENT
Start: 2017-07-20 | End: 2017-07-20

## 2017-07-20 RX ORDER — HYDROCODONE BITARTRATE AND ACETAMINOPHEN 5; 325 MG/1; MG/1
1-2 TABLET ORAL EVERY 6 HOURS PRN
Qty: 20 TABLET | Refills: 0 | Status: SHIPPED | OUTPATIENT
Start: 2017-07-20 | End: 2017-11-16

## 2017-07-20 RX ORDER — METOCLOPRAMIDE 10 MG/1
10 TABLET ORAL ONCE
Status: DISCONTINUED | OUTPATIENT
Start: 2017-07-20 | End: 2017-07-20 | Stop reason: HOSPADM

## 2017-07-20 RX ORDER — ONDANSETRON 2 MG/ML
4 INJECTION INTRAMUSCULAR; INTRAVENOUS ONCE AS NEEDED
Status: DISCONTINUED | OUTPATIENT
Start: 2017-07-20 | End: 2017-07-20

## 2017-07-20 RX ORDER — BUPIVACAINE HYDROCHLORIDE 5 MG/ML
INJECTION, SOLUTION EPIDURAL; INTRACAUDAL AS NEEDED
Status: DISCONTINUED | OUTPATIENT
Start: 2017-07-20 | End: 2017-07-20 | Stop reason: HOSPADM

## 2017-07-20 RX ORDER — MORPHINE SULFATE 2 MG/ML
2 INJECTION, SOLUTION INTRAMUSCULAR; INTRAVENOUS EVERY 10 MIN PRN
Status: DISCONTINUED | OUTPATIENT
Start: 2017-07-20 | End: 2017-07-20

## 2017-07-20 RX ORDER — LIDOCAINE HYDROCHLORIDE 10 MG/ML
INJECTION, SOLUTION EPIDURAL; INFILTRATION; INTRACAUDAL; PERINEURAL AS NEEDED
Status: DISCONTINUED | OUTPATIENT
Start: 2017-07-20 | End: 2017-07-20 | Stop reason: SURG

## 2017-07-20 RX ORDER — MORPHINE SULFATE 4 MG/ML
6 INJECTION, SOLUTION INTRAMUSCULAR; INTRAVENOUS EVERY 10 MIN PRN
Status: DISCONTINUED | OUTPATIENT
Start: 2017-07-20 | End: 2017-07-20

## 2017-07-20 RX ORDER — ACETAMINOPHEN 325 MG/1
650 TABLET ORAL ONCE
Status: COMPLETED | OUTPATIENT
Start: 2017-07-20 | End: 2017-07-20

## 2017-07-20 RX ORDER — HYDROMORPHONE HYDROCHLORIDE 1 MG/ML
0.4 INJECTION, SOLUTION INTRAMUSCULAR; INTRAVENOUS; SUBCUTANEOUS EVERY 5 MIN PRN
Status: DISCONTINUED | OUTPATIENT
Start: 2017-07-20 | End: 2017-07-20

## 2017-07-20 RX ORDER — HALOPERIDOL 5 MG/ML
0.25 INJECTION INTRAMUSCULAR ONCE AS NEEDED
Status: DISCONTINUED | OUTPATIENT
Start: 2017-07-20 | End: 2017-07-20

## 2017-07-20 RX ORDER — HYDROMORPHONE HYDROCHLORIDE 1 MG/ML
0.2 INJECTION, SOLUTION INTRAMUSCULAR; INTRAVENOUS; SUBCUTANEOUS EVERY 5 MIN PRN
Status: DISCONTINUED | OUTPATIENT
Start: 2017-07-20 | End: 2017-07-20

## 2017-07-20 RX ORDER — SODIUM CHLORIDE, SODIUM LACTATE, POTASSIUM CHLORIDE, CALCIUM CHLORIDE 600; 310; 30; 20 MG/100ML; MG/100ML; MG/100ML; MG/100ML
INJECTION, SOLUTION INTRAVENOUS CONTINUOUS
Status: DISCONTINUED | OUTPATIENT
Start: 2017-07-20 | End: 2017-07-20

## 2017-07-20 RX ORDER — NALOXONE HYDROCHLORIDE 0.4 MG/ML
80 INJECTION, SOLUTION INTRAMUSCULAR; INTRAVENOUS; SUBCUTANEOUS AS NEEDED
Status: DISCONTINUED | OUTPATIENT
Start: 2017-07-20 | End: 2017-07-20

## 2017-07-20 RX ORDER — MIDAZOLAM HYDROCHLORIDE 1 MG/ML
INJECTION INTRAMUSCULAR; INTRAVENOUS AS NEEDED
Status: DISCONTINUED | OUTPATIENT
Start: 2017-07-20 | End: 2017-07-20 | Stop reason: SURG

## 2017-07-20 RX ORDER — FAMOTIDINE 20 MG/1
20 TABLET ORAL ONCE
Status: COMPLETED | OUTPATIENT
Start: 2017-07-20 | End: 2017-07-20

## 2017-07-20 RX ORDER — HYDROCODONE BITARTRATE AND ACETAMINOPHEN 5; 325 MG/1; MG/1
1 TABLET ORAL AS NEEDED
Status: DISCONTINUED | OUTPATIENT
Start: 2017-07-20 | End: 2017-07-20

## 2017-07-20 RX ORDER — MORPHINE SULFATE 4 MG/ML
4 INJECTION, SOLUTION INTRAMUSCULAR; INTRAVENOUS EVERY 10 MIN PRN
Status: DISCONTINUED | OUTPATIENT
Start: 2017-07-20 | End: 2017-07-20

## 2017-07-20 RX ADMIN — MIDAZOLAM HYDROCHLORIDE 2 MG: 1 INJECTION INTRAMUSCULAR; INTRAVENOUS at 12:38:00

## 2017-07-20 RX ADMIN — SODIUM CHLORIDE, SODIUM LACTATE, POTASSIUM CHLORIDE, CALCIUM CHLORIDE: 600; 310; 30; 20 INJECTION, SOLUTION INTRAVENOUS at 13:05:00

## 2017-07-20 RX ADMIN — LIDOCAINE HYDROCHLORIDE 50 MG: 10 INJECTION, SOLUTION EPIDURAL; INFILTRATION; INTRACAUDAL; PERINEURAL at 12:38:00

## 2017-07-20 RX ADMIN — SODIUM CHLORIDE, SODIUM LACTATE, POTASSIUM CHLORIDE, CALCIUM CHLORIDE: 600; 310; 30; 20 INJECTION, SOLUTION INTRAVENOUS at 12:39:00

## 2017-07-20 NOTE — ANESTHESIA POSTPROCEDURE EVALUATION
Patient: Jovanni Ordonez    Procedure Summary     Date:  07/20/17 Room / Location:  [address] [place] [address] MAIN OR 02 / [address] [place] [address] OR    Anesthesia Start:  1177 Anesthesia Stop:      Procedure:  BREAST BIOPSY (Left ) Diagnosis:       Sebaceous cyst of skin of left breast      (Se

## 2017-07-20 NOTE — H&P
History of Present Illness:   Ms. Noah Chinchilla is a 38year old woman who presents with a left self-detected breast mass/calcifications. She denies any nipple discharge or axillary adenopathy. She does have breast pain related to the mass.  She does not ha Comment: L wrist sx  2014: TUBAL LIGATION  2014: TUBAL LIGATION     Gynecological History:  Pt is a   Pt was 23years old at time of first pregnancy. She has cumulative breastfeeding history of 6 months, last unknown time ago.   She achieved menarche congestion, nose bleeds, snoring, pain in mouth/throat, hoarseness, change in voice, facial trauma.     Respiratory:  The patient denies chronic cough, phlegm, hemoptysis, pleurisy/chest pain, pneumonia, asthma, wheezing, difficulty in breathing with exert loss/gain, fertility or hormone problems, cold intolerance, thyroid disease.      Allergic/Immunologic:  There is no history of hives, hay fever, angioedema or anaphylaxis.     /76 (BP Location: Left arm, Patient Position: Sitting, Cuff Size: adult) significant lymphadenopathy.     Back: There is no vertebral column tenderness.     Skin: The skin appears normal. There are no suspicious appearing rashes or lesions.     Extremities:  The extremities are without deformity, cyanosis or edema.     Sheila legal representative the potential benefits, risks and side effects of this procedure; the likelihood of the patient achieving goals; and potential problems that might occur during recuperation.   I discussed reasonable alternatives to the procedure, includ

## 2017-07-20 NOTE — ANESTHESIA PREPROCEDURE EVALUATION
Anesthesia PreOp Note    HPI:     Sofie Dinero is a 38year old female who presents for preoperative consultation requested by: Juan Allen MD    Date of Surgery: 7/20/2017    Procedure(s):  BREAST BIOPSY  Indication: Sebaceous cyst of skin of left Medical History:   Diagnosis Date   • Abdominal pain in female     LLQ. Colonoscopy : NL (10-14-15) except hemorrhoids.     • Abnormal Pap smear of cervix     CHAYITO 1   • Amenorrhea    • Anemia    • Anxiety    • Anxiety state, unspecified    • Cyst of buttock • Cancer Maternal Uncle      melanoma   • Diabetes Maternal Grandmother    • Stroke Maternal Grandfather    • Lipids Father      hyperlipidemia   • Diabetes Father        Social History  Social History   Marital status:   Spouse name: N/A    Year Pulmonary - normal exam   (-) shortness of breath  Cardiovascular   Exercise tolerance: good    NYHA Classification: I  Rhythm: regular  Rate: normal    Neuro/Psych    (+) neuromuscular disease,     GI/Hepatic/Renal    (+) GERD well controlled,     Endo/Ot

## 2017-07-20 NOTE — BRIEF OP NOTE
Pre-Operative Diagnosis: Sebaceous cyst of skin of left breast [N60.82]     Post-Operative Diagnosis: Sebaceous cyst of skin of left breast [N60.82]     Procedure Performed:   Procedure(s):  Left breast excisional biopsy    Surgeon(s) and Role:     * Gr

## 2017-07-21 RX ORDER — MIRABEGRON 25 MG/1
25 TABLET, FILM COATED, EXTENDED RELEASE ORAL DAILY
Qty: 30 TABLET | Refills: 1 | Status: SHIPPED | OUTPATIENT
Start: 2017-07-21 | End: 2017-11-16 | Stop reason: DRUGHIGH

## 2017-07-21 NOTE — OPERATIVE REPORT
[facility]    PATIENT'S NAME: Mis Ma   ATTENDING PHYSICIAN: Kerrie Fuentes. Luci Harmon MD   OPERATING PHYSICIAN: Kerrie Fuentes.  Luci Harmon MD   PATIENT ACCOUNT#:   [id number]    LOCATION:  [facility] 10  MEDICAL RECORD #:   [id number]       DA The patient was brought to the operating room, placed in a supine position. She was properly padded and secured. She was given a dose of IV antibiotics, and sequential compression devices were applied to her legs for DVT prophylaxis.   Monitored anesthesi

## 2017-07-26 ENCOUNTER — OFFICE VISIT (OUTPATIENT)
Dept: SURGERY | Facility: CLINIC | Age: 42
End: 2017-07-26

## 2017-07-26 VITALS
RESPIRATION RATE: 18 BRPM | TEMPERATURE: 98 F | BODY MASS INDEX: 33.23 KG/M2 | DIASTOLIC BLOOD PRESSURE: 77 MMHG | OXYGEN SATURATION: 97 % | HEIGHT: 61 IN | SYSTOLIC BLOOD PRESSURE: 122 MMHG | WEIGHT: 176 LBS

## 2017-07-26 DIAGNOSIS — N60.82 SEBACEOUS CYST OF SKIN OF LEFT BREAST: Primary | ICD-10-CM

## 2017-07-26 DIAGNOSIS — Z12.39 SCREENING FOR BREAST CANCER: ICD-10-CM

## 2017-07-26 PROCEDURE — 99024 POSTOP FOLLOW-UP VISIT: CPT | Performed by: SURGERY

## 2017-07-27 NOTE — PROGRESS NOTES
Breast Surgery Post-Operative Visit    Diagnosis: Left breast sebaceous cyst status post excision on July 20th 2017. Stage: N/A    Disease Status:  Surgical treatment complete, no further treatment pending. History:    This 38year old woman presente Left      Comment: wrist tendon surgery  : HEMORRHOIDECTOMY      Comment: internal and anal skin tag removeal. Benign.    11/27/13: HERNIA SURGERY      Comment: hiatal hernia   11/27/13: HERNIA SURGERY  No date: OTHER SURGICAL HISTORY      Comment: L facial trauma.     Respiratory:  The patient denies chronic cough, phlegm, hemoptysis, pleurisy/chest pain, pneumonia, asthma, wheezing, difficulty in breathing with exertion, emphysema, chronic bronchitis, +shortness of breath or abnormal sound when breath Allergic/Immunologic:  There is no history of hives, hay fever, angioedema or anaphylaxis.     /77   Temp 98.2 °F (36.8 °C) (Oral)   Resp 18   Ht 1.549 m (5' 1\")   Wt 79.8 kg (176 lb)   SpO2 97%   BMI 33.25 kg/m²     Physical Examination:   Exami

## 2017-08-14 ENCOUNTER — OFFICE VISIT (OUTPATIENT)
Dept: INTERNAL MEDICINE CLINIC | Facility: CLINIC | Age: 42
End: 2017-08-14

## 2017-08-14 VITALS
WEIGHT: 176 LBS | BODY MASS INDEX: 33 KG/M2 | DIASTOLIC BLOOD PRESSURE: 65 MMHG | SYSTOLIC BLOOD PRESSURE: 102 MMHG | OXYGEN SATURATION: 97 % | HEART RATE: 58 BPM | TEMPERATURE: 98 F

## 2017-08-14 DIAGNOSIS — D50.0 IRON DEFICIENCY ANEMIA DUE TO CHRONIC BLOOD LOSS: Primary | ICD-10-CM

## 2017-08-14 DIAGNOSIS — Z01.818 PRE-OP EXAMINATION: ICD-10-CM

## 2017-08-14 DIAGNOSIS — E78.2 MIXED HYPERLIPIDEMIA: ICD-10-CM

## 2017-08-14 LAB
ALBUMIN SERPL BCP-MCNC: 4.2 G/DL (ref 3.5–4.8)
ALBUMIN/GLOB SERPL: 1.6 {RATIO} (ref 1–2)
ALP SERPL-CCNC: 52 U/L (ref 32–100)
ALT SERPL-CCNC: 21 U/L (ref 14–54)
ANION GAP SERPL CALC-SCNC: 8 MMOL/L (ref 0–18)
AST SERPL-CCNC: 18 U/L (ref 15–41)
BASOPHILS # BLD: 0 K/UL (ref 0–0.2)
BASOPHILS NFR BLD: 1 %
BILIRUB SERPL-MCNC: 0.6 MG/DL (ref 0.3–1.2)
BUN SERPL-MCNC: 19 MG/DL (ref 8–20)
BUN/CREAT SERPL: 22.9 (ref 10–20)
CALCIUM SERPL-MCNC: 9.1 MG/DL (ref 8.5–10.5)
CHLORIDE SERPL-SCNC: 104 MMOL/L (ref 95–110)
CHOLEST SERPL-MCNC: 219 MG/DL (ref 110–200)
CO2 SERPL-SCNC: 24 MMOL/L (ref 22–32)
CREAT SERPL-MCNC: 0.83 MG/DL (ref 0.5–1.5)
EOSINOPHIL # BLD: 0 K/UL (ref 0–0.7)
EOSINOPHIL NFR BLD: 1 %
ERYTHROCYTE [DISTWIDTH] IN BLOOD BY AUTOMATED COUNT: 14.5 % (ref 11–15)
GLOBULIN PLAS-MCNC: 2.7 G/DL (ref 2.5–3.7)
GLUCOSE SERPL-MCNC: 85 MG/DL (ref 70–99)
HCT VFR BLD AUTO: 41 % (ref 35–48)
HDLC SERPL-MCNC: 47 MG/DL
HGB BLD-MCNC: 13.8 G/DL (ref 12–16)
INR BLD: 1 (ref 0.9–1.2)
LDLC SERPL CALC-MCNC: 140 MG/DL (ref 0–99)
LYMPHOCYTES # BLD: 1.6 K/UL (ref 1–4)
LYMPHOCYTES NFR BLD: 23 %
MCH RBC QN AUTO: 27.8 PG (ref 27–32)
MCHC RBC AUTO-ENTMCNC: 33.6 G/DL (ref 32–37)
MCV RBC AUTO: 82.5 FL (ref 80–100)
MONOCYTES # BLD: 0.4 K/UL (ref 0–1)
MONOCYTES NFR BLD: 6 %
NEUTROPHILS # BLD AUTO: 4.9 K/UL (ref 1.8–7.7)
NEUTROPHILS NFR BLD: 70 %
NONHDLC SERPL-MCNC: 172 MG/DL
OSMOLALITY UR CALC.SUM OF ELEC: 284 MOSM/KG (ref 275–295)
PLATELET # BLD AUTO: 239 K/UL (ref 140–400)
PMV BLD AUTO: 8.6 FL (ref 7.4–10.3)
POTASSIUM SERPL-SCNC: 4.1 MMOL/L (ref 3.3–5.1)
PROT SERPL-MCNC: 6.9 G/DL (ref 5.9–8.4)
PROTHROMBIN TIME: 12.6 SECONDS (ref 11.8–14.5)
RBC # BLD AUTO: 4.97 M/UL (ref 3.7–5.4)
SODIUM SERPL-SCNC: 136 MMOL/L (ref 136–144)
TRIGL SERPL-MCNC: 160 MG/DL (ref 1–149)
WBC # BLD AUTO: 6.9 K/UL (ref 4–11)

## 2017-08-14 PROCEDURE — 36415 COLL VENOUS BLD VENIPUNCTURE: CPT | Performed by: INTERNAL MEDICINE

## 2017-08-14 PROCEDURE — 99212 OFFICE O/P EST SF 10 MIN: CPT | Performed by: INTERNAL MEDICINE

## 2017-08-14 PROCEDURE — 99214 OFFICE O/P EST MOD 30 MIN: CPT | Performed by: INTERNAL MEDICINE

## 2017-08-14 RX ORDER — NORGESTIMATE AND ETHINYL ESTRADIOL 7DAYSX3 LO
1 KIT ORAL
COMMUNITY
End: 2017-11-16

## 2017-08-14 RX ORDER — HYDROCODONE BITARTRATE AND ACETAMINOPHEN 5; 325 MG/1; MG/1
1 TABLET ORAL
COMMUNITY
Start: 2013-01-30 | End: 2017-11-16

## 2017-08-14 RX ORDER — PANTOPRAZOLE SODIUM 40 MG/1
40 TABLET, DELAYED RELEASE ORAL
COMMUNITY
End: 2017-11-16

## 2017-08-14 NOTE — PATIENT INSTRUCTIONS
ASSESSMENT/PLAN:   Iron deficiency anemia due to chronic blood loss  (primary encounter diagnosis) Check blood. Mixed hyperlipidemia Check blood.      Pre-op examinationNo motrin, ibuprofen, advil, alleve, naprosyn  with these medications 1 week prior t

## 2017-08-14 NOTE — PROGRESS NOTES
HPI:    Patient ID: Pasquale Ashton is a 38year old female. HPI  Pasquale Ashton is a 38year old female who presents for a pre-operative physical exam. Patient is to have R peroneal, to be done by Dr. Paulino Woo at [facility] on 9-1-17.     Pt has had previous anesthesia wrist x2  No date: FOREARM/WRIST SURGERY UNLISTED Left      Comment: wrist tendon surgery  : HEMORRHOIDECTOMY      Comment: internal and anal skin tag removeal. Benign.    11/27/13: HERNIA SURGERY      Comment: hiatal hernia   11/27/13: HERNIA SURGER Operating Room                                                               Pathologist:           Johny Diana MD                                                              Specimen:    Breast, left, 1.  LEFT BREAST CYST - NO ORIENTATION Gastrointestinal: Negative for abdominal distention, abdominal pain, anal bleeding, blood in stool, constipation, diarrhea, nausea, rectal pain and vomiting.    Endocrine: Negative for cold intolerance, heat intolerance, polydipsia, polyphagia and polyuri hemotympanum. No decreased hearing is noted. Left Ear: Tympanic membrane, external ear and ear canal normal. No lacerations. No drainage, swelling or tenderness. No foreign bodies. No mastoid tenderness.  Tympanic membrane is not injected, not scarred, no No tracheal deviation present. No thyroid mass and no thyromegaly present. Cardiovascular: Normal rate, regular rhythm, S1 normal, S2 normal, normal heart sounds, intact distal pulses and normal pulses.     Pulses:       Carotid pulses are 2+ on the right adenopathy present. Right: No supraclavicular adenopathy present. Left: No supraclavicular adenopathy present. Neurological: She is alert and oriented to person, place, and time. Skin: Skin is warm and dry. No rash noted.  She is not diaph Panel (14) [E]      Prothrombin Time (PT) [E]      Lipid Panel [E]  Medications filled today:    No prescriptions requested or ordered in this encounter     Radiology orders:  None     This consult was sent back the referring physician, Dr. Harper Antoine.       ID#[id number]

## 2017-08-28 ENCOUNTER — TELEPHONE (OUTPATIENT)
Dept: INTERNAL MEDICINE CLINIC | Facility: CLINIC | Age: 42
End: 2017-08-28

## 2017-08-28 NOTE — TELEPHONE ENCOUNTER
Dr. Jackie Liao office is looking for  medical clearance notes ,labs and pulmonologist clearance  Fax  [phone]

## 2017-08-28 NOTE — TELEPHONE ENCOUNTER
All information faxed to Dr. Mechelle Chung office [phone]. Confirmation sheet received. No further action required at this time.

## 2017-08-31 RX ORDER — MIRABEGRON 25 MG/1
25 TABLET, FILM COATED, EXTENDED RELEASE ORAL DAILY
Qty: 30 TABLET | Refills: 1 | Status: SHIPPED | OUTPATIENT
Start: 2017-08-31 | End: 2017-11-16

## 2017-10-24 RX ORDER — CITALOPRAM 40 MG/1
TABLET ORAL
Qty: 30 TABLET | Refills: 1 | Status: SHIPPED | OUTPATIENT
Start: 2017-10-24 | End: 2017-12-18

## 2017-11-16 ENCOUNTER — OFFICE VISIT (OUTPATIENT)
Dept: INTERNAL MEDICINE CLINIC | Facility: CLINIC | Age: 42
End: 2017-11-16

## 2017-11-16 VITALS
OXYGEN SATURATION: 99 % | HEART RATE: 59 BPM | TEMPERATURE: 99 F | HEIGHT: 61 IN | DIASTOLIC BLOOD PRESSURE: 74 MMHG | BODY MASS INDEX: 33.19 KG/M2 | SYSTOLIC BLOOD PRESSURE: 115 MMHG | WEIGHT: 175.81 LBS

## 2017-11-16 DIAGNOSIS — Z23 NEED FOR VACCINATION: ICD-10-CM

## 2017-11-16 DIAGNOSIS — R10.32 LEFT LOWER QUADRANT PAIN: Primary | ICD-10-CM

## 2017-11-16 DIAGNOSIS — E78.2 MIXED HYPERLIPIDEMIA: ICD-10-CM

## 2017-11-16 DIAGNOSIS — M54.50 CHRONIC MIDLINE LOW BACK PAIN WITHOUT SCIATICA: ICD-10-CM

## 2017-11-16 DIAGNOSIS — D50.0 IRON DEFICIENCY ANEMIA DUE TO CHRONIC BLOOD LOSS: ICD-10-CM

## 2017-11-16 DIAGNOSIS — G89.29 CHRONIC MIDLINE LOW BACK PAIN WITHOUT SCIATICA: ICD-10-CM

## 2017-11-16 PROCEDURE — 90471 IMMUNIZATION ADMIN: CPT | Performed by: INTERNAL MEDICINE

## 2017-11-16 PROCEDURE — 99212 OFFICE O/P EST SF 10 MIN: CPT | Performed by: INTERNAL MEDICINE

## 2017-11-16 PROCEDURE — 81003 URINALYSIS AUTO W/O SCOPE: CPT | Performed by: INTERNAL MEDICINE

## 2017-11-16 PROCEDURE — 99214 OFFICE O/P EST MOD 30 MIN: CPT | Performed by: INTERNAL MEDICINE

## 2017-11-16 PROCEDURE — 36415 COLL VENOUS BLD VENIPUNCTURE: CPT | Performed by: INTERNAL MEDICINE

## 2017-11-16 PROCEDURE — 90686 IIV4 VACC NO PRSV 0.5 ML IM: CPT | Performed by: INTERNAL MEDICINE

## 2017-11-16 RX ORDER — CYCLOBENZAPRINE HCL 10 MG
10 TABLET ORAL NIGHTLY
Qty: 7 TABLET | Refills: 0 | Status: SHIPPED | OUTPATIENT
Start: 2017-11-16 | End: 2017-11-23

## 2017-11-16 NOTE — PROGRESS NOTES
HPI:    Patient ID: Kali Settler is a 38year old female. Abdominal Pain   This is a new problem. The current episode started 1 to 4 weeks ago (Same as 1 yr. ago, CT done and told ovarain cyst but when pelvic U/S done and not there except fibroids.  Stanley Howard Constitutional: Negative for activity change, appetite change, chills, diaphoresis, fatigue, fever, unexpected weight change and weight loss. HENT: Negative for drooling, sore throat, trouble swallowing and voice change.     Respiratory: Negative for apne LLQ. Colonoscopy : NL (10-14-15) except hemorrhoids.     • Abnormal Pap smear of cervix     CHAYITO 1   • Amenorrhea    • Anemia    • Anxiety    • Anxiety state, unspecified    • Cyst of buttocks     cyst on left buttocks, removed   • Cyst of ovary, right    • Neck: Trachea normal and phonation normal. No thyroid mass and no thyromegaly present. Cardiovascular: Normal rate, regular rhythm, S1 normal, S2 normal, normal heart sounds, intact distal pulses and normal pulses.     Pulses:       Carotid pulses are 2+ Right hip: She exhibits normal range of motion, normal strength, no tenderness, no bony tenderness, no swelling, no crepitus, no deformity and no laceration.         Left hip: She exhibits normal range of motion, normal strength, no tenderness, no bony Patellar reflexes are 2+ on the right side and 2+ on the left side. Skin: Skin is warm and dry. She is not diaphoretic. Psychiatric: She has a normal mood and affect. Her behavior is normal.   Nursing note and vitals reviewed.            ASSESSMENT/

## 2017-11-17 NOTE — PATIENT INSTRUCTIONS
ASSESSMENT/PLAN:   Left lower quadrant pain  (primary encounter diagnosis) ? Scar tissue. Check urine. Check pelvic U/S.      Need for vaccination    Iron deficiency anemia due to chronic blood loss    Mixed hyperlipidemia    Chronic midline low back pain w

## 2017-11-18 ENCOUNTER — HOSPITAL ENCOUNTER (EMERGENCY)
Facility: HOSPITAL | Age: 42
Discharge: HOME | End: 2017-11-18
Attending: EMERGENCY MEDICINE
Payer: COMMERCIAL

## 2017-11-18 VITALS
OXYGEN SATURATION: 100 % | BODY MASS INDEX: 33 KG/M2 | DIASTOLIC BLOOD PRESSURE: 71 MMHG | TEMPERATURE: 98 F | RESPIRATION RATE: 16 BRPM | SYSTOLIC BLOOD PRESSURE: 115 MMHG | WEIGHT: 176.13 LBS | HEART RATE: 78 BPM

## 2017-11-18 DIAGNOSIS — K52.9 COLITIS: Primary | ICD-10-CM

## 2017-11-18 PROCEDURE — 81025 URINE PREGNANCY TEST: CPT

## 2017-11-18 PROCEDURE — 80048 BASIC METABOLIC PNL TOTAL CA: CPT | Performed by: EMERGENCY MEDICINE

## 2017-11-18 PROCEDURE — 85025 COMPLETE CBC W/AUTO DIFF WBC: CPT | Performed by: EMERGENCY MEDICINE

## 2017-11-18 PROCEDURE — 81001 URINALYSIS AUTO W/SCOPE: CPT | Performed by: EMERGENCY MEDICINE

## 2017-11-18 PROCEDURE — 36415 COLL VENOUS BLD VENIPUNCTURE: CPT

## 2017-11-18 PROCEDURE — 99283 EMERGENCY DEPT VISIT LOW MDM: CPT

## 2017-11-18 RX ORDER — DICYCLOMINE HCL 20 MG
20 TABLET ORAL 4 TIMES DAILY PRN
Qty: 30 TABLET | Refills: 0 | Status: SHIPPED | OUTPATIENT
Start: 2017-11-18 | End: 2017-12-18 | Stop reason: WASHOUT

## 2017-11-18 NOTE — ED INITIAL ASSESSMENT (HPI)
Patient here for c/o LLQ abdominal pain, worse after eating. States she immediately has BM after eating, denies diarrhea.

## 2017-11-19 NOTE — ED PROVIDER NOTES
Patient Seen in: [facility] AND [facility] Emergency Department     History   Patient presents with:  Abdomen/Flank Pain (GI/)    Stated Complaint:     HPI    38year old female with a self-described history of similar symptoms 2 years ago which resulted in [de-identified] 4 (four) times daily as needed. Diclofenac Sodium 50 MG Oral Tab EC,  Take 1 tablet (50 mg total) by mouth 2 (two) times daily. Cyclobenzaprine HCl 10 MG Oral Tab,  Take 1 tablet (10 mg total) by mouth nightly.    Mirabegron ER (MYRBETRIQ) 50 MG Oral Ta normal. Mucous membranes are not pale and not cyanotic. Eyes: Conjunctivae and lids are normal. Right conjunctiva is not injected. Left conjunctiva is not injected. No scleral icterus.  Pupils are equal.   Neck: Normal range of motion and phonation normal on the individual orders.    RAINBOW DRAW BLUE   RAINBOW DRAW LAVENDER   RAINBOW DRAW LIGHT GREEN   RAINBOW DRAW GOLD       Imaging Results Available and Reviewed while in ED: No orders to display    ED Medications Administered: Medications - No data to dis abnormal or significant results:  ED Course as of Nov 18 1818  ------------------------------------------------------------    Impression:  After review and interpretation of the above emergency department workup, the patient was found to have Colitis  (pr

## 2017-11-24 ENCOUNTER — HOSPITAL ENCOUNTER (OUTPATIENT)
Dept: ULTRASOUND IMAGING | Facility: HOSPITAL | Age: 42
Discharge: HOME | End: 2017-11-24
Attending: INTERNAL MEDICINE
Payer: COMMERCIAL

## 2017-11-24 DIAGNOSIS — R10.32 LEFT LOWER QUADRANT PAIN: ICD-10-CM

## 2017-11-24 PROCEDURE — 93976 VASCULAR STUDY: CPT | Performed by: INTERNAL MEDICINE

## 2017-11-24 PROCEDURE — 76856 US EXAM PELVIC COMPLETE: CPT | Performed by: INTERNAL MEDICINE

## 2017-11-24 PROCEDURE — 76830 TRANSVAGINAL US NON-OB: CPT | Performed by: INTERNAL MEDICINE

## 2017-12-18 RX ORDER — CITALOPRAM 40 MG/1
TABLET ORAL
Qty: 30 TABLET | Refills: 1 | Status: SHIPPED | OUTPATIENT
Start: 2017-12-18 | End: 2018-02-06

## 2017-12-18 RX ORDER — MONTELUKAST SODIUM 10 MG/1
10 TABLET ORAL NIGHTLY
Qty: 30 TABLET | Refills: 3 | Status: SHIPPED | OUTPATIENT
Start: 2017-12-18 | End: 2018-06-04 | Stop reason: ALTCHOICE

## 2018-02-06 RX ORDER — CITALOPRAM 40 MG/1
TABLET ORAL
Qty: 30 TABLET | Refills: 1 | Status: SHIPPED | OUTPATIENT
Start: 2018-02-06 | End: 2018-05-24

## 2018-03-07 RX ORDER — SIMVASTATIN 20 MG
20 TABLET ORAL NIGHTLY
Qty: 90 TABLET | Refills: 0 | Status: SHIPPED | OUTPATIENT
Start: 2018-03-07 | End: 2018-06-11

## 2018-04-23 ENCOUNTER — OFFICE VISIT (OUTPATIENT)
Dept: INTERNAL MEDICINE CLINIC | Facility: CLINIC | Age: 43
End: 2018-04-23

## 2018-04-23 ENCOUNTER — TELEPHONE (OUTPATIENT)
Dept: INTERNAL MEDICINE CLINIC | Facility: CLINIC | Age: 43
End: 2018-04-23

## 2018-04-23 VITALS
HEIGHT: 61 IN | HEART RATE: 59 BPM | TEMPERATURE: 98 F | SYSTOLIC BLOOD PRESSURE: 89 MMHG | WEIGHT: 180 LBS | BODY MASS INDEX: 33.99 KG/M2 | DIASTOLIC BLOOD PRESSURE: 49 MMHG

## 2018-04-23 DIAGNOSIS — R74.8 ELEVATED LIVER ENZYMES: ICD-10-CM

## 2018-04-23 DIAGNOSIS — D50.0 IRON DEFICIENCY ANEMIA DUE TO CHRONIC BLOOD LOSS: Primary | ICD-10-CM

## 2018-04-23 DIAGNOSIS — R53.83 FATIGUE, UNSPECIFIED TYPE: ICD-10-CM

## 2018-04-23 DIAGNOSIS — E78.2 MIXED HYPERLIPIDEMIA: ICD-10-CM

## 2018-04-23 PROCEDURE — 99214 OFFICE O/P EST MOD 30 MIN: CPT | Performed by: INTERNAL MEDICINE

## 2018-04-23 PROCEDURE — 99212 OFFICE O/P EST SF 10 MIN: CPT | Performed by: INTERNAL MEDICINE

## 2018-04-23 RX ORDER — MIRABEGRON 50 MG/1
1 TABLET, FILM COATED, EXTENDED RELEASE ORAL NIGHTLY
Qty: 30 TABLET | Refills: 4 | Status: SHIPPED | OUTPATIENT
Start: 2018-04-23 | End: 2018-09-17

## 2018-04-23 RX ORDER — METOPROLOL SUCCINATE 25 MG/1
TABLET, EXTENDED RELEASE ORAL
Refills: 1 | COMMUNITY
Start: 2018-04-09 | End: 2018-09-28

## 2018-04-23 RX ORDER — MIRABEGRON 50 MG/1
1 TABLET, FILM COATED, EXTENDED RELEASE ORAL
Refills: 4 | COMMUNITY
Start: 2018-03-07 | End: 2018-04-23

## 2018-04-23 NOTE — PROGRESS NOTES
HPI:    Patient ID: Bernard Simons is a 38year old female. 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 Hypertension  Patient is here for follow up of hypertension. BP at home: not check. Sees cards. And metoprolol. Has B/P at home newer.    Has bee for agitation and sleep disturbance. Negative for behavioral problems, confusion, decreased concentration, dysphoric mood, hallucinations, self-injury and suicidal ideas. The patient is nervous/anxious. The patient is not hyperactive.     All other systems Lasik  No date: OTHER SURGICAL HISTORY Left      Comment: ankle tendon repair  2014: TUBAL LIGATION  2014: TUBAL LIGATION   Family History   Problem Relation Age of Onset   • Cancer Mother 47     breast cancer    • Cancer Maternal Uncle      melanoma   • D deficiency anemia due to chronic blood loss  (primary encounter diagnosis) Check blood. Mixed hyperlipidemia Check blood. Fatigue, unspecified type Check blood. Elevated liver enzymes Check blood. Urine inc. F/U urology. Anxiety.  Hold m by mouth nightly.            Imaging & Referrals:  None        UE#[id number]

## 2018-04-24 PROBLEM — D50.0 IRON DEFICIENCY ANEMIA DUE TO CHRONIC BLOOD LOSS: Status: ACTIVE | Noted: 2018-04-24

## 2018-05-07 ENCOUNTER — TELEPHONE (OUTPATIENT)
Dept: INTERNAL MEDICINE CLINIC | Facility: CLINIC | Age: 43
End: 2018-05-07

## 2018-05-07 NOTE — TELEPHONE ENCOUNTER
Pt has been informed of last test results and Md's advise, pt was also reminded that she can see her results via \"My chart\". Pt had no questions.

## 2018-05-24 RX ORDER — CITALOPRAM 40 MG/1
TABLET ORAL
Qty: 30 TABLET | Refills: 3 | Status: SHIPPED | OUTPATIENT
Start: 2018-05-24 | End: 2018-09-17

## 2018-06-04 ENCOUNTER — OFFICE VISIT (OUTPATIENT)
Dept: INTERNAL MEDICINE CLINIC | Facility: CLINIC | Age: 43
End: 2018-06-04

## 2018-06-04 VITALS
WEIGHT: 183 LBS | DIASTOLIC BLOOD PRESSURE: 68 MMHG | TEMPERATURE: 98 F | HEART RATE: 72 BPM | SYSTOLIC BLOOD PRESSURE: 110 MMHG | OXYGEN SATURATION: 98 % | HEIGHT: 61 IN | BODY MASS INDEX: 34.55 KG/M2

## 2018-06-04 DIAGNOSIS — N64.4 BREAST PAIN, LEFT: Primary | ICD-10-CM

## 2018-06-04 PROCEDURE — 99215 OFFICE O/P EST HI 40 MIN: CPT | Performed by: INTERNAL MEDICINE

## 2018-06-04 PROCEDURE — 99212 OFFICE O/P EST SF 10 MIN: CPT | Performed by: INTERNAL MEDICINE

## 2018-06-04 NOTE — PATIENT INSTRUCTIONS
ASSESSMENT/PLAN:   Breast pain, left  (primary encounter diagnosis) Check mammogram. Continue self breast exam every month. Take cataflam 50 mg every 12 hrs. With food. No motrin, ibuprofen, advil, alleve, naprosyn  with these medications.  Discussed with smiley

## 2018-06-04 NOTE — PROGRESS NOTES
HPI:    Patient ID: Felicia Zee is a 38year old female. LNMP: 5-18-18. Qmonth. Breast Pain   This is a new problem. The current episode started 1 to 4 weeks ago (During cycle when began. ). The problem occurs constantly.  The problem has been Earl HENT: Negative for congestion, dental problem, drooling, ear discharge, ear pain, facial swelling, hearing loss, mouth sores, nosebleeds, postnasal drip, rhinorrhea, sinus pressure, sneezing, sore throat, tinnitus, trouble swallowing and voice change.     E LLQ. Colonoscopy : NL (10-14-15) except hemorrhoids.     • Abnormal Pap smear of cervix     CHAYITO 1   • Amenorrhea    • Anemia    • Anxiety    • Anxiety state, unspecified    • Cyst of buttocks     cyst on left buttocks, removed   • Cyst of ovary, right    • Cardiovascular: Normal rate, regular rhythm, S1 normal, S2 normal, normal heart sounds, intact distal pulses and normal pulses. Pulses:       Carotid pulses are 2+ on the right side, and 2+ on the left side.        Radial pulses are 2+ on the right side, Head (left side): No submental, no submandibular, no preauricular, no posterior auricular and no occipital adenopathy present. She has no cervical adenopathy.         Right cervical: No superficial cervical, no deep cervical and no posterior cervic

## 2018-06-05 ENCOUNTER — HOSPITAL ENCOUNTER (OUTPATIENT)
Dept: ULTRASOUND IMAGING | Facility: HOSPITAL | Age: 43
Discharge: HOME | End: 2018-06-05
Attending: INTERNAL MEDICINE
Payer: COMMERCIAL

## 2018-06-05 ENCOUNTER — HOSPITAL ENCOUNTER (OUTPATIENT)
Dept: MAMMOGRAPHY | Facility: HOSPITAL | Age: 43
Discharge: HOME | End: 2018-06-05
Attending: INTERNAL MEDICINE
Payer: COMMERCIAL

## 2018-06-05 DIAGNOSIS — N64.4 BREAST PAIN, LEFT: ICD-10-CM

## 2018-06-05 PROCEDURE — 77066 DX MAMMO INCL CAD BI: CPT | Performed by: INTERNAL MEDICINE

## 2018-06-05 PROCEDURE — 76642 ULTRASOUND BREAST LIMITED: CPT | Performed by: INTERNAL MEDICINE

## 2018-06-07 ENCOUNTER — OFFICE VISIT (OUTPATIENT)
Dept: INTERNAL MEDICINE CLINIC | Facility: CLINIC | Age: 43
End: 2018-06-07

## 2018-06-07 VITALS
TEMPERATURE: 98 F | HEART RATE: 58 BPM | SYSTOLIC BLOOD PRESSURE: 108 MMHG | DIASTOLIC BLOOD PRESSURE: 68 MMHG | HEIGHT: 61 IN | WEIGHT: 182 LBS | OXYGEN SATURATION: 99 % | BODY MASS INDEX: 34.36 KG/M2

## 2018-06-07 DIAGNOSIS — N64.4 BREAST TENDERNESS IN FEMALE: Primary | ICD-10-CM

## 2018-06-07 PROCEDURE — 99212 OFFICE O/P EST SF 10 MIN: CPT | Performed by: INTERNAL MEDICINE

## 2018-06-07 PROCEDURE — 99213 OFFICE O/P EST LOW 20 MIN: CPT | Performed by: INTERNAL MEDICINE

## 2018-06-07 NOTE — PATIENT INSTRUCTIONS
ASSESSMENT/PLAN:   Breast tenderness in female  (primary encounter diagnosis) Try warm compresses 2 times a day for 10 minutes. Finish diclofenac. Call if no better in 1 week. F/U breast Sx. No orders of the defined types were placed in this encounter.

## 2018-06-07 NOTE — PROGRESS NOTES
HPI:    Patient ID: Noah Morales is a 38year old female. HPI  Started on dilcofenac X 2 days. NO change in breast pain.      Review of Systems   Constitutional: Negative for activity change, appetite change, chills, diaphoresis, fatigue, fever and unexp • Hiatal hernia 2013   • Incontinence    • Shortness of breath    • Umbilical hernia 4614      Past Surgical History:  No date:       Comment: x 3  06:   No date: EXCIS PRIMARY GANGLION WRIST Left      Comment: wrist x2  No date: FOR Pulmonary/Chest: Effort normal and breath sounds normal. No accessory muscle usage. No apnea, no tachypnea and no bradypnea. She is not intubated. No respiratory distress. She has no decreased breath sounds. She has no wheezes. She has no rhonchi.  She has Breast tenderness in female  (primary encounter diagnosis) Try warm compresses 2 times a day for 10 minutes. Finish diclofenac. Call if no better in 1 week. F/U breast Sx. No orders of the defined types were placed in this encounter.       Meds This Vis

## 2018-06-09 PROBLEM — R06.09 DOE (DYSPNEA ON EXERTION): Status: RESOLVED | Noted: 2017-06-22 | Resolved: 2018-06-09

## 2018-06-09 PROBLEM — R06.00 DOE (DYSPNEA ON EXERTION): Status: RESOLVED | Noted: 2017-06-22 | Resolved: 2018-06-09

## 2018-06-11 ENCOUNTER — OFFICE VISIT (OUTPATIENT)
Dept: OBGYN CLINIC | Facility: CLINIC | Age: 43
End: 2018-06-11

## 2018-06-11 ENCOUNTER — TELEPHONE (OUTPATIENT)
Dept: INTERNAL MEDICINE CLINIC | Facility: CLINIC | Age: 43
End: 2018-06-11

## 2018-06-11 VITALS — SYSTOLIC BLOOD PRESSURE: 114 MMHG | WEIGHT: 183.38 LBS | DIASTOLIC BLOOD PRESSURE: 70 MMHG | BODY MASS INDEX: 35 KG/M2

## 2018-06-11 DIAGNOSIS — N64.4 MASTALGIA IN FEMALE: ICD-10-CM

## 2018-06-11 DIAGNOSIS — Z01.419 WELL WOMAN EXAM WITH ROUTINE GYNECOLOGICAL EXAM: Primary | ICD-10-CM

## 2018-06-11 PROCEDURE — 99213 OFFICE O/P EST LOW 20 MIN: CPT | Performed by: OBSTETRICS & GYNECOLOGY

## 2018-06-11 PROCEDURE — 99396 PREV VISIT EST AGE 40-64: CPT | Performed by: OBSTETRICS & GYNECOLOGY

## 2018-06-11 RX ORDER — SIMVASTATIN 20 MG
20 TABLET ORAL NIGHTLY
Qty: 90 TABLET | Refills: 0 | Status: SHIPPED | OUTPATIENT
Start: 2018-06-11 | End: 2018-09-28

## 2018-06-11 NOTE — PROGRESS NOTES
HPI:   Nyla Buenrostro is a 38year old female who presents for an annual/pap.    Pt has been having left breast mastalgia, daily, does not recall any exacerbating event,  Has an appt with dr Kim Tam specialist that dr Deysi Wyman has recommended and that th (two) times daily. Disp: 14 tablet Rfl: 0      Past Medical History:   Diagnosis Date   • Abdominal pain in female     LLQ. Colonoscopy : NL (10-14-15) except hemorrhoids.     • Abnormal Pap smear of cervix     CHAYITO 1   • Amenorrhea    • Anemia    • Anxiety ST  LUNGS: denies shortness of breath with exertion  CARDIOVASCULAR: denies chest pain on exertion  GI: denies abdominal pain,denies heartburn  : denies dysuria, vaginal discharge or itching,periods regular   MUSCULOSKELETAL: denies back pain  NEURO: den

## 2018-06-11 NOTE — TELEPHONE ENCOUNTER
Current Outpatient Prescriptions:   • •  SIMVASTATIN 20 MG Oral Tab, Take 1 tablet (20 mg total) by mouth nightly., Disp: 90 tablet, Rfl: 0

## 2018-06-26 PROBLEM — N64.4 MASTALGIA IN FEMALE: Status: ACTIVE | Noted: 2018-06-26

## 2018-06-27 ENCOUNTER — OFFICE VISIT (OUTPATIENT)
Dept: SURGERY | Facility: CLINIC | Age: 43
End: 2018-06-27

## 2018-06-27 ENCOUNTER — HOSPITAL ENCOUNTER (OUTPATIENT)
Facility: HOSPITAL | Age: 43
Discharge: HOME | End: 2018-06-27
Attending: SURGERY
Payer: COMMERCIAL

## 2018-06-27 VITALS
RESPIRATION RATE: 18 BRPM | BODY MASS INDEX: 34.93 KG/M2 | HEIGHT: 61 IN | HEART RATE: 59 BPM | SYSTOLIC BLOOD PRESSURE: 116 MMHG | DIASTOLIC BLOOD PRESSURE: 61 MMHG | WEIGHT: 185 LBS | OXYGEN SATURATION: 100 %

## 2018-06-27 DIAGNOSIS — N64.4 BREAST PAIN, LEFT: Primary | ICD-10-CM

## 2018-06-27 PROCEDURE — 99214 OFFICE O/P EST MOD 30 MIN: CPT | Performed by: SURGERY

## 2018-06-27 PROCEDURE — 99211 OFF/OP EST MAY X REQ PHY/QHP: CPT | Performed by: SURGERY

## 2018-06-28 NOTE — PROGRESS NOTES
Breast Surgery Surveillance Visit    Diagnosis: Left breast sebaceous cyst status post excision on July 20th 2017. Stage: N/A    Disease Status:  Surgical treatment complete, no further treatment pending. History:    This 41year old woman presented reflux 2013   • Hiatal hernia 2013   • Incontinence    • Shortness of breath    • Umbilical hernia 7442     Past Surgical History:  No date:       Comment: x 3  06:   No date: EXCIS PRIMARY GANGLION WRIST Left      Comment: wrist x2 History:    Alcohol use No       Smoking status: Never Smoker   Smokeless tobacco: Never Used   The patient is . She has 3 children. She is a homemaker.      Review of Systems:  General:   The patient denies, fever, chills, +night sweats, fatigue, ge pain, back pain or bone pain.     Neuropsychiatric:  There is no history of migraines or severe headaches, seizure/epilepsy, speech problems, coordination problems, trembling/tremors, fainting/black outs, dizziness, memory problems, loss of sensation/numbne length. With regard to her significant cyclical mastalgia, we discussed that this is a common symptomatology, often exacerbated by jane-menopausal fluctuation in hormonal levels.  While there is no good reliable treatment, it has been identified that pete

## 2018-09-17 RX ORDER — CITALOPRAM 40 MG/1
TABLET ORAL
Qty: 30 TABLET | Refills: 3 | Status: SHIPPED | OUTPATIENT
Start: 2018-09-17 | End: 2019-01-08

## 2018-09-17 RX ORDER — MIRABEGRON 50 MG/1
1 TABLET, FILM COATED, EXTENDED RELEASE ORAL NIGHTLY
Qty: 30 TABLET | Refills: 4 | Status: SHIPPED | OUTPATIENT
Start: 2018-09-17 | End: 2019-02-15

## 2018-09-22 ENCOUNTER — APPOINTMENT (OUTPATIENT)
Dept: CT IMAGING | Facility: HOSPITAL | Age: 43
End: 2018-09-22
Attending: EMERGENCY MEDICINE
Payer: COMMERCIAL

## 2018-09-22 ENCOUNTER — HOSPITAL ENCOUNTER (EMERGENCY)
Facility: HOSPITAL | Age: 43
Discharge: HOME | End: 2018-09-22
Attending: EMERGENCY MEDICINE
Payer: COMMERCIAL

## 2018-09-22 VITALS
RESPIRATION RATE: 12 BRPM | DIASTOLIC BLOOD PRESSURE: 66 MMHG | SYSTOLIC BLOOD PRESSURE: 105 MMHG | TEMPERATURE: 98 F | OXYGEN SATURATION: 100 % | WEIGHT: 176 LBS | HEART RATE: 60 BPM | HEIGHT: 61 IN | BODY MASS INDEX: 33.23 KG/M2

## 2018-09-22 DIAGNOSIS — R10.30 LOWER ABDOMINAL PAIN: Primary | ICD-10-CM

## 2018-09-22 LAB
ANION GAP SERPL CALC-SCNC: 9 MMOL/L (ref 0–18)
B-HCG UR QL: NEGATIVE
BASOPHILS # BLD: 0 K/UL (ref 0–0.2)
BASOPHILS NFR BLD: 1 %
BILIRUB UR QL: NEGATIVE
BUN SERPL-MCNC: 20 MG/DL (ref 8–20)
BUN/CREAT SERPL: 27.8 (ref 10–20)
CALCIUM SERPL-MCNC: 9.3 MG/DL (ref 8.5–10.5)
CHLORIDE SERPL-SCNC: 104 MMOL/L (ref 95–110)
CLARITY UR: CLEAR
CO2 SERPL-SCNC: 25 MMOL/L (ref 22–32)
COLOR UR: YELLOW
CREAT SERPL-MCNC: 0.72 MG/DL (ref 0.5–1.5)
EOSINOPHIL # BLD: 0 K/UL (ref 0–0.7)
EOSINOPHIL NFR BLD: 0 %
ERYTHROCYTE [DISTWIDTH] IN BLOOD BY AUTOMATED COUNT: 14.8 % (ref 11–15)
GLUCOSE SERPL-MCNC: 93 MG/DL (ref 70–99)
GLUCOSE UR-MCNC: NEGATIVE MG/DL
HCT VFR BLD AUTO: 39.5 % (ref 35–48)
HGB BLD-MCNC: 13.4 G/DL (ref 12–16)
HGB UR QL STRIP.AUTO: NEGATIVE
KETONES UR-MCNC: NEGATIVE MG/DL
LEUKOCYTE ESTERASE UR QL STRIP.AUTO: NEGATIVE
LYMPHOCYTES # BLD: 1.4 K/UL (ref 1–4)
LYMPHOCYTES NFR BLD: 23 %
MCH RBC QN AUTO: 27.7 PG (ref 27–32)
MCHC RBC AUTO-ENTMCNC: 33.9 G/DL (ref 32–37)
MCV RBC AUTO: 81.8 FL (ref 80–100)
MONOCYTES # BLD: 0.4 K/UL (ref 0–1)
MONOCYTES NFR BLD: 7 %
NEUTROPHILS # BLD AUTO: 4.5 K/UL (ref 1.8–7.7)
NEUTROPHILS NFR BLD: 70 %
NITRITE UR QL STRIP.AUTO: NEGATIVE
OSMOLALITY UR CALC.SUM OF ELEC: 288 MOSM/KG (ref 275–295)
PH UR: 6 [PH] (ref 5–8)
PLATELET # BLD AUTO: 232 K/UL (ref 140–400)
PMV BLD AUTO: 7.9 FL (ref 7.4–10.3)
POTASSIUM SERPL-SCNC: 3.8 MMOL/L (ref 3.3–5.1)
PROT UR-MCNC: NEGATIVE MG/DL
RBC # BLD AUTO: 4.83 M/UL (ref 3.7–5.4)
SODIUM SERPL-SCNC: 138 MMOL/L (ref 136–144)
SP GR UR STRIP: 1.03 (ref 1–1.03)
UROBILINOGEN UR STRIP-ACNC: 4
VIT C UR-MCNC: NEGATIVE MG/DL
WBC # BLD AUTO: 6.4 K/UL (ref 4–11)

## 2018-09-22 PROCEDURE — 99284 EMERGENCY DEPT VISIT MOD MDM: CPT

## 2018-09-22 PROCEDURE — 96374 THER/PROPH/DIAG INJ IV PUSH: CPT

## 2018-09-22 PROCEDURE — 81025 URINE PREGNANCY TEST: CPT

## 2018-09-22 PROCEDURE — 74176 CT ABD & PELVIS W/O CONTRAST: CPT | Performed by: EMERGENCY MEDICINE

## 2018-09-22 PROCEDURE — 85025 COMPLETE CBC W/AUTO DIFF WBC: CPT | Performed by: EMERGENCY MEDICINE

## 2018-09-22 PROCEDURE — 81003 URINALYSIS AUTO W/O SCOPE: CPT | Performed by: EMERGENCY MEDICINE

## 2018-09-22 PROCEDURE — 96361 HYDRATE IV INFUSION ADD-ON: CPT

## 2018-09-22 PROCEDURE — 80048 BASIC METABOLIC PNL TOTAL CA: CPT | Performed by: EMERGENCY MEDICINE

## 2018-09-22 RX ORDER — NAPROXEN 500 MG/1
500 TABLET ORAL 2 TIMES DAILY PRN
Qty: 14 TABLET | Refills: 0 | Status: SHIPPED | OUTPATIENT
Start: 2018-09-22 | End: 2018-09-29

## 2018-09-22 RX ORDER — KETOROLAC TROMETHAMINE 30 MG/ML
30 INJECTION, SOLUTION INTRAMUSCULAR; INTRAVENOUS ONCE
Status: COMPLETED | OUTPATIENT
Start: 2018-09-22 | End: 2018-09-22

## 2018-09-22 RX ORDER — SODIUM CHLORIDE 9 MG/ML
125 INJECTION, SOLUTION INTRAVENOUS CONTINUOUS
Status: DISCONTINUED | OUTPATIENT
Start: 2018-09-22 | End: 2018-09-22

## 2018-09-22 NOTE — ED INITIAL ASSESSMENT (HPI)
Pt presents to ED with a c/o lower back pain which radiates to pelvic area. Also reports urinary urgency and frequency.

## 2018-09-22 NOTE — ED PROVIDER NOTES
Patient Seen in: [facility] AND [facility] Emergency Department    History   No chief complaint on file.     Stated Complaint: Lower Back Pain    HPI    Patient is a 65-year-old female who about a week ago started to have some lower abdominal crampy pain she was Comment:  ankle tendon repair  2014: TUBAL LIGATION  2014: TUBAL LIGATION        Social History    Tobacco Use      Smoking status: Never Smoker      Smokeless tobacco: Never Used    Alcohol use: No      Alcohol/week: 0.0 oz    Drug use: No      Review of content normal.   Nursing note and vitals reviewed.           ED Course     Labs Reviewed   URINALYSIS WITH CULTURE REFLEX - Abnormal; Notable for the following components:       Result Value    Urobilinogen Urine 4.0 (*)     All other components within nor diagnosis)    Disposition:  Discharge  9/22/2018 11:21 am    Follow-up:  Kristy Sood.MD [facility]equiel [facility] [id number] [phone]    Schedule an appointment as soon as possible for a visit in 1 day          Medications Prescribed:  Current D

## 2018-09-28 ENCOUNTER — OFFICE VISIT (OUTPATIENT)
Dept: INTERNAL MEDICINE CLINIC | Facility: CLINIC | Age: 43
End: 2018-09-28
Payer: COMMERCIAL

## 2018-09-28 VITALS
BODY MASS INDEX: 34.17 KG/M2 | WEIGHT: 181 LBS | HEIGHT: 61 IN | SYSTOLIC BLOOD PRESSURE: 125 MMHG | HEART RATE: 62 BPM | DIASTOLIC BLOOD PRESSURE: 69 MMHG | TEMPERATURE: 99 F | OXYGEN SATURATION: 98 %

## 2018-09-28 DIAGNOSIS — M54.31 BILATERAL SCIATICA: Primary | ICD-10-CM

## 2018-09-28 DIAGNOSIS — M54.32 BILATERAL SCIATICA: Primary | ICD-10-CM

## 2018-09-28 PROCEDURE — 96372 THER/PROPH/DIAG INJ SC/IM: CPT | Performed by: INTERNAL MEDICINE

## 2018-09-28 PROCEDURE — 99214 OFFICE O/P EST MOD 30 MIN: CPT | Performed by: INTERNAL MEDICINE

## 2018-09-28 RX ORDER — METOPROLOL SUCCINATE 25 MG/1
TABLET, EXTENDED RELEASE ORAL
Qty: 45 TABLET | Refills: 1 | Status: SHIPPED | OUTPATIENT
Start: 2018-09-28 | End: 2019-01-08

## 2018-09-28 RX ORDER — TIZANIDINE HYDROCHLORIDE 4 MG/1
4 CAPSULE, GELATIN COATED ORAL NIGHTLY
Qty: 5 CAPSULE | Refills: 0 | Status: SHIPPED | OUTPATIENT
Start: 2018-09-28 | End: 2018-10-03

## 2018-09-28 RX ORDER — SIMVASTATIN 20 MG
20 TABLET ORAL NIGHTLY
Qty: 90 TABLET | Refills: 0 | Status: SHIPPED | OUTPATIENT
Start: 2018-09-28 | End: 2019-02-15

## 2018-09-28 RX ORDER — KETOROLAC TROMETHAMINE 30 MG/ML
30 INJECTION, SOLUTION INTRAMUSCULAR; INTRAVENOUS ONCE
Status: COMPLETED | OUTPATIENT
Start: 2018-09-28 | End: 2018-09-28

## 2018-09-28 RX ORDER — KETOROLAC TROMETHAMINE 10 MG/1
10 TABLET, FILM COATED ORAL EVERY 8 HOURS
Qty: 15 TABLET | Refills: 0 | Status: SHIPPED | OUTPATIENT
Start: 2018-09-28 | End: 2019-04-17

## 2018-09-28 RX ADMIN — KETOROLAC TROMETHAMINE 30 MG: 30 INJECTION, SOLUTION INTRAMUSCULAR; INTRAVENOUS at 16:21:00

## 2018-09-28 NOTE — PROGRESS NOTES
HPI:    Patient ID: Noah Chinchilla is a 41year old female. LBP X 1 week. No radiation. No numbness, tingling. Was in ER 9-22-18. B/L sides. Gave Rx. For naprosyn. No truama. H/O LBP but not this (achey in middle).        Exercise level: somewhat active an tablet Rfl: 0   Metoprolol Succinate ER 25 MG Oral Tablet 24 Hr TAKE 1/2 TABLET(S) EVERY DAY BY ORAL ROUTE. Disp: 45 tablet Rfl: 1   Ketorolac Tromethamine 10 MG Oral Tab Take 1 tablet (10 mg total) by mouth every 8 (eight) hours.  Disp: 15 tablet Rfl: 0 Comment:  Lasik  No date: OTHER SURGICAL HISTORY;  Left      Comment:  ankle tendon repair  2014: TUBAL LIGATION  2014: TUBAL LIGATION   Family History   Problem Relation Age of Onset   • Cancer Mother 47        breast cancer    • Breast Cancer Mother 47 Right knee: She exhibits normal range of motion, no swelling, no effusion, no ecchymosis, no deformity, no laceration, no erythema, normal alignment, no LCL laxity, normal patellar mobility, no bony tenderness, normal meniscus and no MCL laxity.  No ten naprosyn  with these medications. Careful with back. Correct lifting with legs and not with back. Turn body completely to lift something from side. Back exercises.  Correct posture when sitting with feet flat on floor and back against chair and computer at

## 2018-09-28 NOTE — PATIENT INSTRUCTIONS
ASSESSMENT/PLAN:   Bilateral sciatica  (primary encounter diagnosis) Toradol shot now and then toradol 10 mg every 8 hrs. With food for 5 days to begin tomorrow AM. Take tizantidine 4 mg at night X 5 nights.  No motrin, ibuprofen, advil, alleve, naprosyn  w a nerve. Pressure from bone  As a disk wears out, the vertebrae right above and below the disk start to touch. This can put pressure on a nerve. Often, abnormal bone (called bone spurs) grows where the vertebrae rub against each other.  This can cause th back and thigh muscles stretched and strong. This gives your back better support. Be sure to walk with your spine’s three curves aligned, by keeping your head, hips, and toes connected by a vertical line.    Date Last Reviewed: 10/18/2015  © 2625-3517 The S healthcare provider before using medicines, especially if you have other medical problems or are taking other medicines. · You may use acetaminophen or ibuprofen to control pain, unless your healthcare provider prescribed other pain medicine.  If you have back may help if you are driving long distances.   Standing  When standing for long periods, shift most of your weight to one leg at a time. Alternate legs every few minutes.   Sleeping  The best way to sleep is on your side with your knees bent.  Put a low feel pain while doing any of these exercises, stop and talk to your doctor about a more specific exercise program that better suits your condition.   Low back stretch  The point of stretching is to [facility] more flexible and increase your range of motion. times.  2. Heel raises: Stand facing the wall. Slowly raise the heels of your feet up and down, while keeping your toes on the floor. If you have trouble balancing, you can touch the wall with your hands. Repeat 10 times.   More advanced exercises  When you Always follow your healthcare professional's instructions. Back Pain (Acute or Chronic)    Back pain is one of the most common problems. The good news is that most people feel better in 1 to 2 weeks, and most of the rest in 1 to 2 months.  Most peopl respond to medical treatment, X-rays and other tests may be needed. Home care  Try these home care recommendations:  · When in bed, try to find a position of comfort. A firm mattress is best. Try lying flat on your back with pillows under your knees.  You prescribed. If you have chronic conditions like diabetes, liver or kidney disease, stomach ulcers, or gastrointestinal bleeding, or are taking blood thinners, talk to your doctor before taking any medicine.   · Be careful if you are given a prescription med reduces swelling. Both cold and heat can reduce pain. Protect your skin by placing a towel between your body and the ice or heat source. · For the first few days, apply an ice pack for 15 to 20 minutes, several times a day.  To make a cold pack, put ice cu weight or just by moving the wrong way. Back strain can be uncomfortable, even painful. And it can take weeks or months to improve. To help yourself feel better and prevent future back strains, try these tips.   Important Note: Do not give aspirin to childr your back:  · Reach only as high as your shoulders. · Use a stool or stepladder if you need to get closer to the load. · Test the weight of the load by pushing up on a corner before lifting. If it’s too heavy, get help.   Bending and lifting  When you’re Together these holes form a tunnel called the spinal canal.  · The lamina of each vertebra forms the back of the spinal canal.  · Running through the canal are nerves.  They are attached in a bundle called the spinal cord for most of the canal.  · A foramen 2 to 5 minutes. Press lightly at first. Slowly increase firmness. Date Last Reviewed: 10/18/2015  © 2982-1602 The [employer] [address] [facility], [address] [place] [address]. All rights reserved.  This information is not intended as a substitute for p your healthcare professional's instructions. Back Exercises: Lower Back Rotation    To start, lie on your back with your knees bent and feet flat on the floor. Don’t press your neck or lower back to the floor. Breathe deeply.  You should feel comfort switch sides. Date Last Reviewed: 3/1/2018  © 9444-6533 [employer] [address]. [address] [facility] [place] [address] [place]. All rights reserved. This information is not intended as a substitute for professional medical care.  Always follow your healthcar

## 2018-11-27 ENCOUNTER — OFFICE VISIT (OUTPATIENT)
Dept: INTERNAL MEDICINE CLINIC | Facility: CLINIC | Age: 43
End: 2018-11-27
Payer: COMMERCIAL

## 2018-11-27 VITALS
BODY MASS INDEX: 33.99 KG/M2 | HEART RATE: 67 BPM | HEIGHT: 61 IN | SYSTOLIC BLOOD PRESSURE: 126 MMHG | TEMPERATURE: 99 F | WEIGHT: 180 LBS | DIASTOLIC BLOOD PRESSURE: 85 MMHG | OXYGEN SATURATION: 96 %

## 2018-11-27 DIAGNOSIS — J20.9 ACUTE BRONCHITIS, UNSPECIFIED ORGANISM: Primary | ICD-10-CM

## 2018-11-27 PROCEDURE — 99214 OFFICE O/P EST MOD 30 MIN: CPT | Performed by: INTERNAL MEDICINE

## 2018-11-27 RX ORDER — MONTELUKAST SODIUM 10 MG/1
10 TABLET ORAL NIGHTLY
Qty: 30 TABLET | Refills: 0 | Status: SHIPPED | OUTPATIENT
Start: 2018-11-27 | End: 2019-04-17

## 2018-11-27 RX ORDER — AZITHROMYCIN 250 MG/1
TABLET, FILM COATED ORAL
Qty: 6 TABLET | Refills: 0 | Status: SHIPPED | OUTPATIENT
Start: 2018-11-27 | End: 2019-04-17

## 2018-11-27 RX ORDER — FLUTICASONE PROPIONATE 50 MCG
1 SPRAY, SUSPENSION (ML) NASAL DAILY
Qty: 1 INHALER | Refills: 0 | Status: SHIPPED | OUTPATIENT
Start: 2018-11-27 | End: 2019-04-17

## 2018-11-27 NOTE — PROGRESS NOTES
HPI:    Patient ID: Reji Montgomery is a 41year old female. HPI she came today complaining of a URI symptoms.   She states that her symptoms started on Saturday with runny nose congestion chills and on Sunday she started having cough which progressively is problems, confusion, hallucinations and sleep disturbance. The patient is not nervous/anxious. Current Outpatient Medications:  Montelukast Sodium 10 MG Oral Tab Take 1 tablet (10 mg total) by mouth nightly.  Disp: 30 tablet Rfl: 0   Fluticasone HEMORRHOIDECTOMY      internal and anal skin tag removeal. Benign.     • HERNIA SURGERY  11/27/13    hiatal hernia    • HERNIA SURGERY  11/27/13   • BONNIE NEEDLE LOCALIZATION W/ SPECIMEN 1 SITE LEFT Left 07/2017    sebaceous cyst   • OTHER SURGICAL HIS present. No tracheal tenderness present. No tracheal deviation present. No thyroid mass and no thyromegaly present. Cardiovascular: Normal rate, regular rhythm, normal heart sounds and intact distal pulses. Exam reveals no gallop and no friction rub.    Debara Bosworth Sig: Take 1 tablet (10 mg total) by mouth nightly. • Fluticasone Propionate 50 MCG/ACT Nasal Suspension 1 Inhaler 0     Si spray by Each Nare route daily.    • azithromycin (ZITHROMAX Z-PJ) 250 MG Oral Tab 6 tablet 0     Sig: Take two tablets by rey

## 2018-11-27 NOTE — PATIENT INSTRUCTIONS
Acute bronchitis, unspecified organism  (primary encounter diagnosis)advised her to drink plenty of fluids , take singulair at night , flonase nasal spray , zpack - and take as directed with food til done.  Take probiotics while on antibiotics if can to pre

## 2019-01-08 RX ORDER — CITALOPRAM 40 MG/1
TABLET ORAL
Qty: 30 TABLET | Refills: 3 | Status: SHIPPED | OUTPATIENT
Start: 2019-01-08 | End: 2019-06-27

## 2019-01-08 RX ORDER — METOPROLOL SUCCINATE 25 MG/1
TABLET, EXTENDED RELEASE ORAL
Qty: 45 TABLET | Refills: 1 | Status: SHIPPED | OUTPATIENT
Start: 2019-01-08 | End: 2019-06-28

## 2019-02-15 RX ORDER — SIMVASTATIN 20 MG
20 TABLET ORAL NIGHTLY
Qty: 90 TABLET | Refills: 1 | Status: SHIPPED | OUTPATIENT
Start: 2019-02-15 | End: 2019-12-06

## 2019-02-15 RX ORDER — MIRABEGRON 50 MG/1
1 TABLET, FILM COATED, EXTENDED RELEASE ORAL NIGHTLY
Qty: 30 TABLET | Refills: 1 | Status: SHIPPED | OUTPATIENT
Start: 2019-02-15 | End: 2019-05-20

## 2019-04-17 ENCOUNTER — OFFICE VISIT (OUTPATIENT)
Dept: INTERNAL MEDICINE CLINIC | Facility: CLINIC | Age: 44
End: 2019-04-17
Payer: COMMERCIAL

## 2019-04-17 VITALS
OXYGEN SATURATION: 96 % | SYSTOLIC BLOOD PRESSURE: 110 MMHG | BODY MASS INDEX: 34.89 KG/M2 | HEIGHT: 61 IN | DIASTOLIC BLOOD PRESSURE: 58 MMHG | HEART RATE: 72 BPM | TEMPERATURE: 98 F | WEIGHT: 184.81 LBS

## 2019-04-17 DIAGNOSIS — Z02.0 SCHOOL PHYSICAL EXAM: Primary | ICD-10-CM

## 2019-04-17 PROCEDURE — 90471 IMMUNIZATION ADMIN: CPT | Performed by: INTERNAL MEDICINE

## 2019-04-17 PROCEDURE — 90715 TDAP VACCINE 7 YRS/> IM: CPT | Performed by: INTERNAL MEDICINE

## 2019-04-17 PROCEDURE — 99214 OFFICE O/P EST MOD 30 MIN: CPT | Performed by: INTERNAL MEDICINE

## 2019-04-18 NOTE — PATIENT INSTRUCTIONS
School physical exam  (primary encounter diagnosis) tb  Test and blood work     htn controlled advised to continue with current medication watch diet avoid salty food     Urine incontinence advised to continue with current medication discussed about referr

## 2019-04-18 NOTE — PROGRESS NOTES
HPI:    Patient ID: Janell Moore is a 41year old female.     HPI  She is here today need blood work and school physical . 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 Disp: 90 tablet Rfl: 1   CITALOPRAM HYDROBROMIDE 40 MG Oral Tab TAKE 1 TABLET (40 MG TOTAL) BY MOUTH NIGHTLY.  Disp: 30 tablet Rfl: 3   METOPROLOL SUCCINATE ER 25 MG Oral Tablet 24 H Social History    Tobacco Use      Smoking status: Never Smoker      Smokeless tobacco: Never Used    Alcohol use: No      Alcohol/week: 0.0 oz    Drug use: No       PHYSICAL EXAM:    Physical Exam   Constitutional: She is oriented to person, place, and ti the right side, and 2+ on the left side. Pulmonary/Chest: Effort normal and breath sounds normal. No accessory muscle usage. No respiratory distress. She has no wheezes. She has no rales. She exhibits no tenderness. Abdominal: Soft.  Bowel sounds are no

## 2019-04-20 ENCOUNTER — LAB ENCOUNTER (OUTPATIENT)
Dept: LAB | Facility: HOSPITAL | Age: 44
End: 2019-04-20
Attending: INTERNAL MEDICINE
Payer: COMMERCIAL

## 2019-04-20 DIAGNOSIS — D50.0 IRON DEFICIENCY ANEMIA DUE TO CHRONIC BLOOD LOSS: ICD-10-CM

## 2019-04-20 DIAGNOSIS — Z02.0 SCHOOL PHYSICAL EXAM: ICD-10-CM

## 2019-04-20 PROCEDURE — 85025 COMPLETE CBC W/AUTO DIFF WBC: CPT

## 2019-04-20 PROCEDURE — 86706 HEP B SURFACE ANTIBODY: CPT

## 2019-04-20 PROCEDURE — 82728 ASSAY OF FERRITIN: CPT

## 2019-04-20 PROCEDURE — 86735 MUMPS ANTIBODY: CPT

## 2019-04-20 PROCEDURE — 86762 RUBELLA ANTIBODY: CPT

## 2019-04-20 PROCEDURE — 86480 TB TEST CELL IMMUN MEASURE: CPT

## 2019-04-20 PROCEDURE — 86787 VARICELLA-ZOSTER ANTIBODY: CPT

## 2019-04-20 PROCEDURE — 87340 HEPATITIS B SURFACE AG IA: CPT

## 2019-04-20 PROCEDURE — 86765 RUBEOLA ANTIBODY: CPT

## 2019-04-20 PROCEDURE — 36415 COLL VENOUS BLD VENIPUNCTURE: CPT

## 2019-04-26 PROBLEM — E61.1 IRON DEFICIENCY: Status: ACTIVE | Noted: 2019-04-26

## 2019-05-06 ENCOUNTER — TELEPHONE (OUTPATIENT)
Dept: INTERNAL MEDICINE CLINIC | Facility: CLINIC | Age: 44
End: 2019-05-06

## 2019-05-06 NOTE — TELEPHONE ENCOUNTER
Patient had a drug test for school  Physical and one of her test drug test she needs a note to justify that she is taking medication .  Please call her she will  in [place] (results on your desk )

## 2019-05-06 NOTE — TELEPHONE ENCOUNTER
Spoke with patient asking can you write letter with the medications she is taking. She will ask to be rechecked.

## 2019-05-06 NOTE — TELEPHONE ENCOUNTER
She is not taking  benozo- she is on citalopram_ and that will not show as benzo. I can give note that she is taking citalopram but that will not justify why she has benzo in the urine . Did she take anything ? Xanax? Ativan?

## 2019-05-06 NOTE — TELEPHONE ENCOUNTER
Patient called back in regards to the letter. I explained to her that Benzo- wouldn't come up since she is taking Citalopram. Patient states to have never taken Xanax or Ativan, only takes what we rx her.  She is wondering if benzo- would come up under the

## 2019-05-14 ENCOUNTER — TELEPHONE (OUTPATIENT)
Dept: INTERNAL MEDICINE CLINIC | Facility: CLINIC | Age: 44
End: 2019-05-14

## 2019-05-16 NOTE — TELEPHONE ENCOUNTER
Patient was informed that they're working on the prior auth, it will take approximately 15 business days.

## 2019-05-20 RX ORDER — MIRABEGRON 50 MG/1
1 TABLET, FILM COATED, EXTENDED RELEASE ORAL NIGHTLY
Qty: 90 TABLET | Refills: 1 | Status: SHIPPED | OUTPATIENT
Start: 2019-05-20 | End: 2019-10-28

## 2019-06-12 ENCOUNTER — OFFICE VISIT (OUTPATIENT)
Dept: HEMATOLOGY/ONCOLOGY | Facility: HOSPITAL | Age: 44
End: 2019-06-12
Attending: INTERNAL MEDICINE
Payer: COMMERCIAL

## 2019-06-12 VITALS
OXYGEN SATURATION: 99 % | RESPIRATION RATE: 16 BRPM | DIASTOLIC BLOOD PRESSURE: 66 MMHG | HEART RATE: 69 BPM | SYSTOLIC BLOOD PRESSURE: 116 MMHG | TEMPERATURE: 99 F

## 2019-06-12 DIAGNOSIS — E61.1 IRON DEFICIENCY: Primary | ICD-10-CM

## 2019-06-12 PROCEDURE — 96374 THER/PROPH/DIAG INJ IV PUSH: CPT

## 2019-06-12 RX ORDER — 0.9 % SODIUM CHLORIDE 0.9 %
VIAL (ML) INJECTION
Status: DISCONTINUED
Start: 2019-06-12 | End: 2019-06-12

## 2019-06-12 NOTE — PROGRESS NOTES
Pt to infusion area for her first venofer. Last ferritin level was 9.7. Procedure explained. Reviewed with pt potential for a reaction with venofer. Given Damián information sheet. Venofer given via 24g angiocath right AC.  Good blood return throughout infu

## 2019-06-21 ENCOUNTER — TELEPHONE (OUTPATIENT)
Dept: INTERNAL MEDICINE CLINIC | Facility: CLINIC | Age: 44
End: 2019-06-21

## 2019-06-21 DIAGNOSIS — Z12.39 BREAST CANCER SCREENING: Primary | ICD-10-CM

## 2019-06-21 NOTE — TELEPHONE ENCOUNTER
Patient calling for yearly Bilateral Mammogram Screening order, please call patient when order completed

## 2019-06-26 ENCOUNTER — OFFICE VISIT (OUTPATIENT)
Dept: HEMATOLOGY/ONCOLOGY | Facility: HOSPITAL | Age: 44
End: 2019-06-26
Attending: INTERNAL MEDICINE
Payer: COMMERCIAL

## 2019-06-26 VITALS
OXYGEN SATURATION: 96 % | RESPIRATION RATE: 16 BRPM | SYSTOLIC BLOOD PRESSURE: 115 MMHG | TEMPERATURE: 98 F | HEART RATE: 69 BPM | DIASTOLIC BLOOD PRESSURE: 63 MMHG

## 2019-06-26 DIAGNOSIS — E61.1 IRON DEFICIENCY: Primary | ICD-10-CM

## 2019-06-26 PROCEDURE — 96374 THER/PROPH/DIAG INJ IV PUSH: CPT

## 2019-06-26 NOTE — PROGRESS NOTES
Patient arrives for venofer 2 of 4. Patient reports she is feeling well, complained of a headache after the first one, denied any other complaints. PIV started in left AC, positive blood return noted.  Venofer given slowly over 10 minutes, positive blood re

## 2019-06-27 RX ORDER — CITALOPRAM 40 MG/1
TABLET ORAL
Qty: 90 TABLET | Refills: 1 | Status: SHIPPED | OUTPATIENT
Start: 2019-06-27 | End: 2019-12-26 | Stop reason: ALTCHOICE

## 2019-06-27 NOTE — TELEPHONE ENCOUNTER
Refill passed per [facility], [facility] protocol.   Refill Protocol Appointment Criteria  · Appointment scheduled in the past 6 months or in the next 3 months  Recent Outpatient Visits            Yesterday Iron deficiency    [address]

## 2019-07-01 RX ORDER — METOPROLOL SUCCINATE 25 MG/1
TABLET, EXTENDED RELEASE ORAL
Qty: 45 TABLET | Refills: 1 | Status: SHIPPED | OUTPATIENT
Start: 2019-07-01 | End: 2020-03-07

## 2019-07-01 NOTE — TELEPHONE ENCOUNTER
Refill passed per [facility], [facility] protocol.   Hypertensive Medications  Protocol Criteria:  · Appointment scheduled in the past 6 months or in the next 3 months  · BMP or CMP in the past 12 months  · Creatinine result < 2  Recent Outpatient Visits

## 2019-07-03 ENCOUNTER — HOSPITAL ENCOUNTER (OUTPATIENT)
Dept: MAMMOGRAPHY | Facility: HOSPITAL | Age: 44
Discharge: HOME | End: 2019-07-03
Attending: INTERNAL MEDICINE
Payer: COMMERCIAL

## 2019-07-03 DIAGNOSIS — Z12.39 BREAST CANCER SCREENING: ICD-10-CM

## 2019-07-03 PROCEDURE — 77063 BREAST TOMOSYNTHESIS BI: CPT | Performed by: INTERNAL MEDICINE

## 2019-07-03 PROCEDURE — 77067 SCR MAMMO BI INCL CAD: CPT | Performed by: INTERNAL MEDICINE

## 2019-07-08 ENCOUNTER — OFFICE VISIT (OUTPATIENT)
Dept: OBGYN CLINIC | Facility: CLINIC | Age: 44
End: 2019-07-08
Payer: COMMERCIAL

## 2019-07-08 VITALS
BODY MASS INDEX: 35 KG/M2 | HEART RATE: 72 BPM | SYSTOLIC BLOOD PRESSURE: 114 MMHG | WEIGHT: 183 LBS | DIASTOLIC BLOOD PRESSURE: 73 MMHG

## 2019-07-08 DIAGNOSIS — Z01.419 WOMEN'S ANNUAL ROUTINE GYNECOLOGICAL EXAMINATION: Primary | ICD-10-CM

## 2019-07-08 PROCEDURE — 99396 PREV VISIT EST AGE 40-64: CPT | Performed by: OBSTETRICS & GYNECOLOGY

## 2019-07-08 NOTE — PROGRESS NOTES
HPI:   Jeraldsabinomj Lowery is a 45year old female who presents for an annual/pap.        Wt Readings from Last 6 Encounters:  07/08/19 : 183 lb (83 kg)  04/17/19 : 184 lb 12.8 oz (83.8 kg)  11/27/18 : 180 lb (81.6 kg)  09/28/18 : 181 lb (82.1 kg)  09/22/18 : 176 enzymes     Fatty liver. • Esophageal reflux 2013    S/P Lap. Nissen fundoplication.    • Hiatal hernia 2013   • Incontinence    • Umbilical hernia 2967      Past Surgical History:   Procedure Laterality Date   • BREAST BIOPSY Left 7/20/2017    Performed 114/73   Pulse 72   Wt 183 lb (83 kg)   LMP 06/06/2019 (Exact Date)   BMI 34.58 kg/m²   Body mass index is 34.58 kg/m².    GENERAL: well developed, well nourished,in no apparent distress  SKIN: no rashes,no suspicious lesions  HEENT: atraumatic, normocephal

## 2019-07-09 LAB — HPV I/H RISK 1 DNA SPEC QL NAA+PROBE: POSITIVE

## 2019-07-10 ENCOUNTER — OFFICE VISIT (OUTPATIENT)
Dept: HEMATOLOGY/ONCOLOGY | Facility: HOSPITAL | Age: 44
End: 2019-07-10
Attending: INTERNAL MEDICINE
Payer: COMMERCIAL

## 2019-07-10 VITALS
SYSTOLIC BLOOD PRESSURE: 114 MMHG | DIASTOLIC BLOOD PRESSURE: 65 MMHG | TEMPERATURE: 99 F | OXYGEN SATURATION: 99 % | HEART RATE: 71 BPM | RESPIRATION RATE: 16 BRPM

## 2019-07-10 DIAGNOSIS — E61.1 IRON DEFICIENCY: Primary | ICD-10-CM

## 2019-07-10 PROCEDURE — 96374 THER/PROPH/DIAG INJ IV PUSH: CPT

## 2019-07-10 NOTE — PROGRESS NOTES
Pt to the infusion area for venofer. States she has not had any problems with the past infusions. Venofer given over 8 minutes via 24 g angiocath left AC. Tolerated well. IV removed, 2x2 and coban to site. No redness or swelling at the site.  Discharged domonique

## 2019-07-11 LAB
HPV16 DNA CVX QL PROBE+SIG AMP: NEGATIVE
HPV18 DNA CVX QL PROBE+SIG AMP: NEGATIVE

## 2019-07-19 ENCOUNTER — TELEPHONE (OUTPATIENT)
Dept: OBGYN CLINIC | Facility: CLINIC | Age: 44
End: 2019-07-19

## 2019-07-19 NOTE — TELEPHONE ENCOUNTER
Pt informed of pap result and need for [place] procedure. Pt verbalized understanding. No further question. Scheduled for [place] procedure.

## 2019-07-24 ENCOUNTER — OFFICE VISIT (OUTPATIENT)
Dept: HEMATOLOGY/ONCOLOGY | Facility: HOSPITAL | Age: 44
End: 2019-07-24
Attending: INTERNAL MEDICINE
Payer: COMMERCIAL

## 2019-07-24 VITALS
TEMPERATURE: 98 F | RESPIRATION RATE: 16 BRPM | OXYGEN SATURATION: 98 % | HEART RATE: 54 BPM | DIASTOLIC BLOOD PRESSURE: 61 MMHG | SYSTOLIC BLOOD PRESSURE: 103 MMHG

## 2019-07-24 DIAGNOSIS — E61.1 IRON DEFICIENCY: Primary | ICD-10-CM

## 2019-07-24 PROCEDURE — 96374 THER/PROPH/DIAG INJ IV PUSH: CPT

## 2019-07-24 NOTE — PROGRESS NOTES
Linda Gomez to infusion for the last venofer dose that had been ordered by Dr. Gonzalez Come for iron deficiency anemia. States still feels tired. Will go to outpatient labs in mid August for standing lab orders and make follow up exam after.     PIV established wit

## 2019-08-06 ENCOUNTER — OFFICE VISIT (OUTPATIENT)
Dept: OBGYN CLINIC | Facility: CLINIC | Age: 44
End: 2019-08-06
Payer: COMMERCIAL

## 2019-08-06 VITALS — DIASTOLIC BLOOD PRESSURE: 77 MMHG | HEART RATE: 64 BPM | SYSTOLIC BLOOD PRESSURE: 115 MMHG

## 2019-08-06 DIAGNOSIS — R87.610 ASCUS WITH POSITIVE HIGH RISK HPV CERVICAL: Primary | ICD-10-CM

## 2019-08-06 DIAGNOSIS — Z01.812 PRE-PROCEDURAL LABORATORY EXAMINATION: ICD-10-CM

## 2019-08-06 DIAGNOSIS — R87.810 ASCUS WITH POSITIVE HIGH RISK HPV CERVICAL: Primary | ICD-10-CM

## 2019-08-06 LAB
CONTROL LINE PRESENT WITH A CLEAR BACKGROUND (YES/NO): YES YES/NO
KIT LOT #: NORMAL NUMERIC
PREGNANCY TEST, URINE: NEGATIVE

## 2019-08-06 PROCEDURE — 57454 BX/CURETT OF CERVIX W/SCOPE: CPT | Performed by: OBSTETRICS & GYNECOLOGY

## 2019-08-06 PROCEDURE — 81025 URINE PREGNANCY TEST: CPT | Performed by: OBSTETRICS & GYNECOLOGY

## 2019-08-06 NOTE — PROGRESS NOTES
HPI:   Tom Martinez is a 45year old female who presents for a colposcopy. Pt with ascus pap/pos hpv, pt counseled on options and pt opted for colposcopy today.       Wt Readings from Last 6 Encounters:  07/08/19 : 183 lb (83 kg)  04/17/19 : 184 lb 12.8 oz cyst on left buttocks, removed   • Cyst of ovary, right    • Decorative tattoo    • Elevated liver enzymes     Fatty liver. • Esophageal reflux 2013    S/P Lap. Nissen fundoplication.    • Hiatal hernia 2013   • Incontinence    • Umbilical hernia 0035 denies hx of anemia  ENDOCRINE: denies thyroid history  ALL/ASTHMA: denies hx of allergy or asthma    EXAM:   /81 (BP Location: Right arm, Patient Position: Sitting, Cuff Size: adult)   Pulse 64   LMP 07/07/2019 (Approximate)   Breastfeeding?  No   Th

## 2019-08-10 ENCOUNTER — LAB ENCOUNTER (OUTPATIENT)
Dept: LAB | Facility: HOSPITAL | Age: 44
End: 2019-08-10
Attending: INTERNAL MEDICINE
Payer: COMMERCIAL

## 2019-08-10 DIAGNOSIS — D50.0 IRON DEFICIENCY ANEMIA DUE TO CHRONIC BLOOD LOSS: ICD-10-CM

## 2019-08-10 LAB
BASOPHILS # BLD AUTO: 0.07 X10(3) UL (ref 0–0.2)
BASOPHILS NFR BLD AUTO: 0.8 %
DEPRECATED HBV CORE AB SER IA-ACNC: 139.1 NG/ML (ref 12–240)
DEPRECATED RDW RBC AUTO: 46.9 FL (ref 35.1–46.3)
EOSINOPHIL # BLD AUTO: 0.05 X10(3) UL (ref 0–0.7)
EOSINOPHIL NFR BLD AUTO: 0.6 %
ERYTHROCYTE [DISTWIDTH] IN BLOOD BY AUTOMATED COUNT: 15.5 % (ref 11–15)
HCT VFR BLD AUTO: 41.3 % (ref 35–48)
HGB BLD-MCNC: 13.3 G/DL (ref 12–16)
IMM GRANULOCYTES # BLD AUTO: 0.16 X10(3) UL (ref 0–1)
IMM GRANULOCYTES NFR BLD: 1.9 %
LYMPHOCYTES # BLD AUTO: 1.86 X10(3) UL (ref 1–4)
LYMPHOCYTES NFR BLD AUTO: 22.2 %
MCH RBC QN AUTO: 26.7 PG (ref 26–34)
MCHC RBC AUTO-ENTMCNC: 32.2 G/DL (ref 31–37)
MCV RBC AUTO: 82.8 FL (ref 80–100)
MONOCYTES # BLD AUTO: 0.65 X10(3) UL (ref 0.1–1)
MONOCYTES NFR BLD AUTO: 7.7 %
NEUTROPHILS # BLD AUTO: 5.6 X10 (3) UL (ref 1.5–7.7)
NEUTROPHILS # BLD AUTO: 5.6 X10(3) UL (ref 1.5–7.7)
NEUTROPHILS NFR BLD AUTO: 66.8 %
PLATELET # BLD AUTO: 260 10(3)UL (ref 150–450)
RBC # BLD AUTO: 4.99 X10(6)UL (ref 3.8–5.3)
WBC # BLD AUTO: 8.4 X10(3) UL (ref 4–11)

## 2019-08-10 PROCEDURE — 82728 ASSAY OF FERRITIN: CPT

## 2019-08-10 PROCEDURE — 85025 COMPLETE CBC W/AUTO DIFF WBC: CPT

## 2019-08-10 PROCEDURE — 36415 COLL VENOUS BLD VENIPUNCTURE: CPT

## 2019-08-13 ENCOUNTER — TELEPHONE (OUTPATIENT)
Dept: OBGYN CLINIC | Facility: CLINIC | Age: 44
End: 2019-08-13

## 2019-08-13 NOTE — TELEPHONE ENCOUNTER
Called pt ,left message. No dysplasia noted on colp biopsy,  rec f/u pap smear in 6 months. Please call pt and help her schedule this.

## 2019-08-13 NOTE — TELEPHONE ENCOUNTER
Pt Name and  verified. Patient informed and verbalized understanding. States that she will call to schedule pap. Placed in f/u recall book 6mth pap f/u.

## 2019-09-28 ENCOUNTER — OFFICE VISIT (OUTPATIENT)
Dept: INTERNAL MEDICINE CLINIC | Facility: CLINIC | Age: 44
End: 2019-09-28
Payer: COMMERCIAL

## 2019-09-28 VITALS
OXYGEN SATURATION: 97 % | BODY MASS INDEX: 33.56 KG/M2 | TEMPERATURE: 98 F | HEIGHT: 61.75 IN | HEART RATE: 72 BPM | DIASTOLIC BLOOD PRESSURE: 72 MMHG | SYSTOLIC BLOOD PRESSURE: 120 MMHG | WEIGHT: 182.38 LBS

## 2019-09-28 DIAGNOSIS — M54.6 BILATERAL THORACIC BACK PAIN, UNSPECIFIED CHRONICITY: ICD-10-CM

## 2019-09-28 DIAGNOSIS — R53.83 OTHER FATIGUE: ICD-10-CM

## 2019-09-28 DIAGNOSIS — R39.15 URGENCY OF URINATION: Primary | ICD-10-CM

## 2019-09-28 LAB
ALBUMIN SERPL-MCNC: 4 G/DL (ref 3.4–5)
ALBUMIN/GLOB SERPL: 1.3 {RATIO} (ref 1–2)
ALP LIVER SERPL-CCNC: 65 U/L (ref 37–98)
ALT SERPL-CCNC: 30 U/L (ref 13–56)
ANION GAP SERPL CALC-SCNC: 6 MMOL/L (ref 0–18)
APPEARANCE: CLEAR
AST SERPL-CCNC: 16 U/L (ref 15–37)
BILIRUB SERPL-MCNC: 0.5 MG/DL (ref 0.1–2)
BILIRUB UR QL: NEGATIVE
BILIRUBIN: NEGATIVE
BUN BLD-MCNC: 20 MG/DL (ref 7–18)
BUN/CREAT SERPL: 26.3 (ref 10–20)
CALCIUM BLD-MCNC: 8.9 MG/DL (ref 8.5–10.1)
CHLORIDE SERPL-SCNC: 109 MMOL/L (ref 98–112)
CLARITY UR: CLEAR
CO2 SERPL-SCNC: 25 MMOL/L (ref 21–32)
COLOR UR: YELLOW
CREAT BLD-MCNC: 0.76 MG/DL (ref 0.55–1.02)
DEPRECATED RDW RBC AUTO: 45.1 FL (ref 35.1–46.3)
ERYTHROCYTE [DISTWIDTH] IN BLOOD BY AUTOMATED COUNT: 14.8 % (ref 11–15)
EST. AVERAGE GLUCOSE BLD GHB EST-MCNC: 117 MG/DL (ref 68–126)
GLOBULIN PLAS-MCNC: 3.1 G/DL (ref 2.8–4.4)
GLUCOSE (URINE DIPSTICK): NEGATIVE MG/DL
GLUCOSE BLD-MCNC: 83 MG/DL (ref 70–99)
GLUCOSE UR-MCNC: NEGATIVE MG/DL
HBA1C MFR BLD HPLC: 5.7 % (ref ?–5.7)
HCT VFR BLD AUTO: 42.2 % (ref 35–48)
HGB BLD-MCNC: 14 G/DL (ref 12–16)
HGB UR QL STRIP.AUTO: NEGATIVE
KETONES (URINE DIPSTICK): NEGATIVE MG/DL
KETONES UR-MCNC: NEGATIVE MG/DL
LEUKOCYTE ESTERASE UR QL STRIP.AUTO: NEGATIVE
LEUKOCYTES: NEGATIVE
M PROTEIN MFR SERPL ELPH: 7.1 G/DL (ref 6.4–8.2)
MCH RBC QN AUTO: 28.3 PG (ref 26–34)
MCHC RBC AUTO-ENTMCNC: 33.2 G/DL (ref 31–37)
MCV RBC AUTO: 85.3 FL (ref 80–100)
MULTISTIX LOT#: NORMAL NUMERIC
NITRITE UR QL STRIP.AUTO: NEGATIVE
NITRITE, URINE: NEGATIVE
OCCULT BLOOD: NEGATIVE
OSMOLALITY SERPL CALC.SUM OF ELEC: 292 MOSM/KG (ref 275–295)
PATIENT FASTING: YES
PH UR: 6 [PH] (ref 5–8)
PH, URINE: 5.5 (ref 4.5–8)
PLATELET # BLD AUTO: 254 10(3)UL (ref 150–450)
POTASSIUM SERPL-SCNC: 4 MMOL/L (ref 3.5–5.1)
PROT UR-MCNC: NEGATIVE MG/DL
RBC # BLD AUTO: 4.95 X10(6)UL (ref 3.8–5.3)
SODIUM SERPL-SCNC: 140 MMOL/L (ref 136–145)
SP GR UR STRIP: 1.03 (ref 1–1.03)
SPECIFIC GRAVITY: 1.02 (ref 1–1.03)
URINE-COLOR: YELLOW
UROBILINOGEN UR STRIP-ACNC: <2
UROBILINOGEN,SEMI-QN: 0.2 MG/DL (ref 0–1.9)
WBC # BLD AUTO: 6.5 X10(3) UL (ref 4–11)

## 2019-09-28 PROCEDURE — 36415 COLL VENOUS BLD VENIPUNCTURE: CPT | Performed by: INTERNAL MEDICINE

## 2019-09-28 PROCEDURE — 81003 URINALYSIS AUTO W/O SCOPE: CPT | Performed by: INTERNAL MEDICINE

## 2019-09-28 PROCEDURE — 99213 OFFICE O/P EST LOW 20 MIN: CPT | Performed by: INTERNAL MEDICINE

## 2019-09-28 NOTE — PROGRESS NOTES
Felicia Zee is a 45year old female. Patient presents with:  Back Pain: x 1 month  Fatigue: x 1 month  Urge Incontinence: x 1 month    HPI:   21-year-old female with a past medical history of urge incontinence here for evaluation of back pain, polyuria and Benign.     • HERNIA SURGERY  11/27/13    hiatal hernia    • HERNIA SURGERY  11/27/13   • BONNIE NEEDLE LOCALIZATION 1 SITE LEFT (CPT=19281) Left 07/2017    sebaceous cyst   • OTHER SURGICAL HISTORY      Lasik   • OTHER SURGICAL HISTORY Left     ankle tendon r 09/08/2019 (Approximate)   SpO2 97%   Breastfeeding? No   BMI 33.63 kg/m²     Physical Exam    Constitutional: She is oriented to person, place, and time. She appears well-nourished. No distress. HENT:   Head: Normocephalic.    Right Ear: Tympanic membran chronicity  It is reproducible with twisting. I am not sure this is anything to do with a urinary infection versus musculoskeletal pain. I will wait for the urine test to come back. If this test comes back we will try anti-inflammatory.     Plan: As leena

## 2019-10-01 ENCOUNTER — TELEPHONE (OUTPATIENT)
Dept: INTERNAL MEDICINE CLINIC | Facility: CLINIC | Age: 44
End: 2019-10-01

## 2019-10-01 NOTE — TELEPHONE ENCOUNTER
8 Taty Lau gynecologist should have access to my notes and to the lab works. If the patient wants us to fax it to their office I do not have any problem.   Thank you

## 2019-10-01 NOTE — TELEPHONE ENCOUNTER
Patient needs Office notes from Dr. Brian Green faxed to Dr. Padmini Recinos M.D. Urogynecology. Patient was seen in office 09/28/2019. Please fax to [phone].

## 2019-10-02 NOTE — TELEPHONE ENCOUNTER
Patient calling back, advised DR Fidel Castro note and still requesting to fax the NOTES and LABS to DR Blas Turner 87 office. Faxed today to the number provided and with confirmation.

## 2019-10-30 RX ORDER — MIRABEGRON 50 MG/1
1 TABLET, FILM COATED, EXTENDED RELEASE ORAL NIGHTLY
Qty: 90 TABLET | Refills: 1 | Status: SHIPPED | OUTPATIENT
Start: 2019-10-30 | End: 2019-12-06 | Stop reason: ALTCHOICE

## 2019-10-31 NOTE — TELEPHONE ENCOUNTER
Refill passed per [facility], [facility] protocol.   Refill Protocol Appointment Criteria  · Appointment scheduled in the past 12 months or in the next 3 months  Recent Outpatient Visits            1 month ago Urgency of urination    [facility], [facility], [address],

## 2019-11-05 ENCOUNTER — OFFICE VISIT (OUTPATIENT)
Dept: UROLOGY | Facility: HOSPITAL | Age: 44
End: 2019-11-05
Attending: OBSTETRICS & GYNECOLOGY
Payer: COMMERCIAL

## 2019-11-05 VITALS
WEIGHT: 182 LBS | DIASTOLIC BLOOD PRESSURE: 68 MMHG | HEIGHT: 61.75 IN | BODY MASS INDEX: 33.49 KG/M2 | SYSTOLIC BLOOD PRESSURE: 104 MMHG

## 2019-11-05 DIAGNOSIS — R35.1 NOCTURIA: ICD-10-CM

## 2019-11-05 DIAGNOSIS — N39.3 FEMALE STRESS INCONTINENCE: ICD-10-CM

## 2019-11-05 DIAGNOSIS — N81.84 PELVIC MUSCLE WASTING: ICD-10-CM

## 2019-11-05 DIAGNOSIS — N39.41 URGE INCONTINENCE: Primary | ICD-10-CM

## 2019-11-05 DIAGNOSIS — R39.15 URINARY URGENCY: ICD-10-CM

## 2019-11-05 PROCEDURE — 99202 OFFICE O/P NEW SF 15 MIN: CPT

## 2019-11-05 PROCEDURE — 87086 URINE CULTURE/COLONY COUNT: CPT | Performed by: OBSTETRICS & GYNECOLOGY

## 2019-11-05 NOTE — PROGRESS NOTES
Devon Harris, DO  11/5/2019     Referred by Dr. Qing Tellez  Pt here with self    Patient presents with:  Urge Incontinence    Saw Urology in past, started vesicare 5mg --> myrbetriq 50mg (used to help)    HPI:  Some ONI  Some UUI  Nocturia x4  Joelle hyperlipidemia   • Diabetes Father       Social History    Tobacco Use      Smoking status: Never Smoker      Smokeless tobacco: Never Used    Alcohol use: No      Alcohol/week: 0.0 standard drinks    Drug use: No       Allergies:  No Known Allergies    Me Nocturia    (N39.3) Female stress incontinence    (N81.84) Pelvic muscle wasting      Discussion Items:   Urodynamics and cystoscopy for evaluation of LUTS  Behavioral and pharmacologic treatments for OAB  Nonsurgical and surgical treatments for Stress Chuck Dai

## 2019-11-14 ENCOUNTER — OFFICE VISIT (OUTPATIENT)
Dept: UROLOGY | Facility: HOSPITAL | Age: 44
End: 2019-11-14
Attending: OBSTETRICS & GYNECOLOGY
Payer: COMMERCIAL

## 2019-11-14 VITALS
HEIGHT: 61.75 IN | BODY MASS INDEX: 33.49 KG/M2 | WEIGHT: 182 LBS | SYSTOLIC BLOOD PRESSURE: 114 MMHG | DIASTOLIC BLOOD PRESSURE: 72 MMHG

## 2019-11-14 DIAGNOSIS — N39.3 FEMALE STRESS INCONTINENCE: Primary | ICD-10-CM

## 2019-11-14 PROCEDURE — 81002 URINALYSIS NONAUTO W/O SCOPE: CPT

## 2019-11-14 PROCEDURE — 51729 CYSTOMETROGRAM W/VP&UP: CPT

## 2019-11-14 PROCEDURE — 51797 INTRAABDOMINAL PRESSURE TEST: CPT

## 2019-11-14 PROCEDURE — 51784 ANAL/URINARY MUSCLE STUDY: CPT

## 2019-11-14 PROCEDURE — 51741 ELECTRO-UROFLOWMETRY FIRST: CPT

## 2019-11-14 NOTE — PATIENT INSTRUCTIONS
[address] [place] [address]  [facility]: [phone]       Urodynamic Testing Discharge Instructions: There are NO dietary or activity restrictions. You may resume your normal schedule.       You

## 2019-11-14 NOTE — PROCEDURES
Patient here for urodynamic testing. Procedure explained and confirmed by patient. See evaluation form for results. Both verbal and written discharge instructions were given.   Patient tolerated procedure well and will follow up with Dr. Linda Melo 7   mL  Pattern:  []  Normal  [x]  Poor flow     []  Intermittent  []  Other  Void:   []  Typical  []  Atypical    Additional Notes:    CYSTOMETRY one:  Urethral Catheter:  Fr 7 / tdoc  Abd Catheter:     Fr 7 / tdoc   Infusion:  Water Rate 50 mL/min Stable  Urge leakage?     [x]  Yes []  No  Volume at 1st unhibited detrusor cont:   135 ML @ strong desire - urge leak - unable to stop  Detrusor instability provoked by:    []  Spontaneous []  Coughing  [x]  Filling  []  Valsalva  []  Other    Additional N

## 2019-12-06 ENCOUNTER — OFFICE VISIT (OUTPATIENT)
Dept: UROLOGY | Facility: HOSPITAL | Age: 44
End: 2019-12-06
Attending: OBSTETRICS & GYNECOLOGY
Payer: COMMERCIAL

## 2019-12-06 VITALS
DIASTOLIC BLOOD PRESSURE: 82 MMHG | SYSTOLIC BLOOD PRESSURE: 130 MMHG | WEIGHT: 182 LBS | BODY MASS INDEX: 33.49 KG/M2 | HEIGHT: 61.75 IN

## 2019-12-06 DIAGNOSIS — N39.41 URGE INCONTINENCE: ICD-10-CM

## 2019-12-06 DIAGNOSIS — N32.81 DETRUSOR INSTABILITY: Primary | ICD-10-CM

## 2019-12-06 PROCEDURE — 99212 OFFICE O/P EST SF 10 MIN: CPT

## 2019-12-06 RX ORDER — OXYBUTYNIN CHLORIDE 5 MG/1
5 TABLET, EXTENDED RELEASE ORAL DAILY
Qty: 90 TABLET | Refills: 3 | Status: SHIPPED | OUTPATIENT
Start: 2019-12-06 | End: 2020-10-01

## 2019-12-06 RX ORDER — SIMVASTATIN 20 MG
20 TABLET ORAL NIGHTLY
Qty: 90 TABLET | Refills: 1 | Status: SHIPPED | OUTPATIENT
Start: 2019-12-06 | End: 2020-06-01

## 2019-12-06 NOTE — PROGRESS NOTES
Pt presents w/ initial c/o UI  Urodynamic testing undergone without complication.   Results reviewed with patient  42/10cc & 146/1  [facility] 135cc  DO @ 135cc  No ONI    Discussed with patient mgmt options for UUI    Discussed dietary and behavioral modifications

## 2019-12-06 NOTE — TELEPHONE ENCOUNTER
Dr. Ebony Roa, patient calling for refill on this medication. Informed she is due for routine appointment.  Scheduled patient for 12/26 for an opening at 10:15 but then by the time I went to \"accept\" time was not available any longer so patient was double alanis

## 2019-12-06 NOTE — TELEPHONE ENCOUNTER
Please review; protocol failed. Requested Prescriptions     Pending Prescriptions Disp Refills   • SIMVASTATIN 20 MG Oral Tab [Pharmacy Med Name: SIMVASTATIN 20 MG TABLET] 90 tablet 0     Sig: Take 1 tablet (20 mg total) by mouth nightly.          Recent

## 2019-12-12 ENCOUNTER — TELEPHONE (OUTPATIENT)
Dept: OBGYN CLINIC | Facility: CLINIC | Age: 44
End: 2019-12-12

## 2019-12-12 NOTE — TELEPHONE ENCOUNTER
12/12/2019 Pt will be due for her 6 month f/u pap on February 2020. Negative Colpo bx.from 08/06/2019. Recall letter sent.        Please Charity Vazquez. Dr. Noris Santillan

## 2019-12-26 ENCOUNTER — OFFICE VISIT (OUTPATIENT)
Dept: INTERNAL MEDICINE CLINIC | Facility: CLINIC | Age: 44
End: 2019-12-26
Payer: COMMERCIAL

## 2019-12-26 VITALS
HEART RATE: 64 BPM | HEIGHT: 61.75 IN | DIASTOLIC BLOOD PRESSURE: 67 MMHG | OXYGEN SATURATION: 97 % | BODY MASS INDEX: 33.53 KG/M2 | TEMPERATURE: 98 F | WEIGHT: 182.19 LBS | RESPIRATION RATE: 18 BRPM | SYSTOLIC BLOOD PRESSURE: 108 MMHG

## 2019-12-26 DIAGNOSIS — R07.89 OTHER CHEST PAIN: ICD-10-CM

## 2019-12-26 DIAGNOSIS — F41.9 ANXIETY: ICD-10-CM

## 2019-12-26 DIAGNOSIS — Z00.00 ROUTINE GENERAL MEDICAL EXAMINATION AT A HEALTH CARE FACILITY: ICD-10-CM

## 2019-12-26 DIAGNOSIS — E78.2 MIXED HYPERLIPIDEMIA: Primary | ICD-10-CM

## 2019-12-26 DIAGNOSIS — R74.8 ELEVATED LIVER ENZYMES: ICD-10-CM

## 2019-12-26 PROCEDURE — 99396 PREV VISIT EST AGE 40-64: CPT | Performed by: INTERNAL MEDICINE

## 2019-12-26 PROCEDURE — 99214 OFFICE O/P EST MOD 30 MIN: CPT | Performed by: INTERNAL MEDICINE

## 2019-12-26 PROCEDURE — 90471 IMMUNIZATION ADMIN: CPT | Performed by: INTERNAL MEDICINE

## 2019-12-26 PROCEDURE — 93000 ELECTROCARDIOGRAM COMPLETE: CPT | Performed by: INTERNAL MEDICINE

## 2019-12-26 PROCEDURE — 90686 IIV4 VACC NO PRSV 0.5 ML IM: CPT | Performed by: INTERNAL MEDICINE

## 2019-12-26 PROCEDURE — 36415 COLL VENOUS BLD VENIPUNCTURE: CPT | Performed by: INTERNAL MEDICINE

## 2019-12-26 RX ORDER — ESCITALOPRAM OXALATE 20 MG/1
20 TABLET ORAL NIGHTLY
Qty: 30 TABLET | Refills: 1 | Status: SHIPPED | OUTPATIENT
Start: 2019-12-26 | End: 2020-03-07
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
## 2019-12-26 NOTE — PATIENT INSTRUCTIONS
ASSESSMENT/PLAN:   Mixed hyperlipidemia  (primary encounter diagnosis) Check blood. Elevated liver enzymes Check blood. Routine general medical examination at a health care facility Check blood.      AnxietyGolden Rules for Coping with Panic      1) nightly.        Imaging & Referrals:   NAVIGATOR  FLULAVAL INFLUENZA VACCINE QUAD PRESERVATIVE FREE 0.5 ML

## 2019-12-27 ENCOUNTER — TELEPHONE (OUTPATIENT)
Dept: INTERNAL MEDICINE CLINIC | Facility: CLINIC | Age: 44
End: 2019-12-27

## 2019-12-27 NOTE — TELEPHONE ENCOUNTER
Received:  Today   Message Contents   Riki Victoria sent to MD Prashanth [address]ixt[address] Dr.,     On 12/27, the following referrals for therapy were provided to the patient:     Alex Bass, Therapist, Lexis Mccoy, Therapist,

## 2020-03-07 RX ORDER — ESCITALOPRAM OXALATE 20 MG/1
20 TABLET ORAL NIGHTLY
Qty: 90 TABLET | Refills: 1 | Status: SHIPPED | OUTPATIENT
Start: 2020-03-07 | End: 2020-03-31 | Stop reason: ALTCHOICE

## 2020-03-07 RX ORDER — METOPROLOL SUCCINATE 25 MG/1
TABLET, EXTENDED RELEASE ORAL
Qty: 45 TABLET | Refills: 1 | Status: SHIPPED | OUTPATIENT
Start: 2020-03-07 | End: 2020-06-01

## 2020-03-07 NOTE — TELEPHONE ENCOUNTER
Refill passed per [facility], [facility] protocol.   Refill Protocol Appointment Criteria  · Appointment scheduled in the past 6 months or in the next 3 months  Recent Outpatient Visits            2 months ago Mixed hyperlipidemia    [facility], [facility], [address], Component Value Date    GLU 93 12/26/2019    BUN 19 (H) 12/26/2019    CREATSERUM 0.74 12/26/2019    BUNCREA 25.7 (H) 12/26/2019    GFRNAA 100 12/26/2019    GFRAA 116 12/26/2019    CA 8.8 12/26/2019    ALKPHOS 65 06/16/2016    AST 8 (L) 12/26/2019    ALT

## 2020-03-29 PROBLEM — Z12.39 BREAST CANCER SCREENING: Status: ACTIVE | Noted: 2020-03-29

## 2020-03-29 PROBLEM — R07.89 OTHER CHEST PAIN: Status: RESOLVED | Noted: 2019-12-26 | Resolved: 2020-03-29

## 2020-03-30 NOTE — PROGRESS NOTES
HPI:    Patient ID: Noah Morales is a 45year old female. Exercise level: moderately active and has been following low salt diet. Weight has been up. Very careful with carbs. No sodas. No chocolate. No caffeine. Spoke with psych.  Has to schedul nosebleeds, postnasal drip, rhinorrhea, sinus pressure, sinus pain, sneezing, sore throat, tinnitus, trouble swallowing and voice change. Eyes: Negative for photophobia, pain, discharge, redness, itching and visual disturbance. Respiratory: Negative. MOUTH NIGHTLY. 90 tablet 1     Allergies:No Known Allergies    HISTORY:  Past Medical History:   Diagnosis Date   • Abdominal pain in female     LLQ. Colonoscopy : NL (10-14-15) except hemorrhoids.     • Abnormal Pap smear of cervix     CHAYITO 1   • Amenorrhea (85.1 kg)   SpO2 96%   BMI 34.59 kg/m²   BP Readings from Last 3 Encounters:  03/31/20 : 115/74  12/26/19 : 108/67  12/06/19 : 130/82    Wt Readings from Last 3 Encounters:  03/31/20 : 187 lb 9.6 oz (85.1 kg)  12/26/19 : 182 lb 3.2 oz (82.6 kg)  12/06/19 : jaw. No dental abscesses, uvula swelling or lacerations. No oropharyngeal exudate, posterior oropharyngeal edema, posterior oropharyngeal erythema or tonsillar abscesses. Neck: Trachea normal and phonation normal. Neck supple.  No thyroid mass and no thyr Thought content normal.   Nursing note and vitals reviewed. ASSESSMENT/PLAN:   Iron deficiency anemia due to chronic blood loss  (primary encounter diagnosis) Stable. Mixed hyperlipidemia Stable. Check blood with next OV.      Breast cancer sc

## 2020-03-31 ENCOUNTER — TELEPHONE (OUTPATIENT)
Dept: INTERNAL MEDICINE CLINIC | Facility: CLINIC | Age: 45
End: 2020-03-31

## 2020-03-31 ENCOUNTER — OFFICE VISIT (OUTPATIENT)
Dept: INTERNAL MEDICINE CLINIC | Facility: CLINIC | Age: 45
End: 2020-03-31
Payer: COMMERCIAL

## 2020-03-31 VITALS
HEART RATE: 66 BPM | TEMPERATURE: 98 F | RESPIRATION RATE: 18 BRPM | HEIGHT: 61.75 IN | BODY MASS INDEX: 34.53 KG/M2 | OXYGEN SATURATION: 96 % | DIASTOLIC BLOOD PRESSURE: 74 MMHG | WEIGHT: 187.63 LBS | SYSTOLIC BLOOD PRESSURE: 115 MMHG

## 2020-03-31 DIAGNOSIS — D50.0 IRON DEFICIENCY ANEMIA DUE TO CHRONIC BLOOD LOSS: Primary | ICD-10-CM

## 2020-03-31 DIAGNOSIS — Z12.39 BREAST CANCER SCREENING: ICD-10-CM

## 2020-03-31 DIAGNOSIS — D50.0 IRON DEFICIENCY ANEMIA DUE TO CHRONIC BLOOD LOSS: ICD-10-CM

## 2020-03-31 DIAGNOSIS — E78.2 MIXED HYPERLIPIDEMIA: Primary | ICD-10-CM

## 2020-03-31 DIAGNOSIS — R51.9 FACIAL PAIN: ICD-10-CM

## 2020-03-31 DIAGNOSIS — F32.A DEPRESSION, UNSPECIFIED DEPRESSION TYPE: ICD-10-CM

## 2020-03-31 DIAGNOSIS — E78.2 MIXED HYPERLIPIDEMIA: ICD-10-CM

## 2020-03-31 PROCEDURE — 99214 OFFICE O/P EST MOD 30 MIN: CPT | Performed by: INTERNAL MEDICINE

## 2020-03-31 RX ORDER — FLUOXETINE 20 MG/1
20 TABLET, FILM COATED ORAL DAILY
Qty: 30 TABLET | Refills: 1 | Status: SHIPPED | OUTPATIENT
Start: 2020-03-31 | End: 2020-05-27

## 2020-03-31 RX ORDER — FLUTICASONE PROPIONATE 50 MCG
2 SPRAY, SUSPENSION (ML) NASAL DAILY
Qty: 1 BOTTLE | Refills: 0 | Status: SHIPPED | OUTPATIENT
Start: 2020-03-31 | End: 2020-05-27

## 2020-03-31 NOTE — PATIENT INSTRUCTIONS
ASSESSMENT/PLAN:   Iron deficiency anemia due to chronic blood loss  (primary encounter diagnosis) Stable. Mixed hyperlipidemia Stable. Check blood with next OV. Breast cancer screeningNormal mammogram. Continue self breast exam every month.

## 2020-03-31 NOTE — TELEPHONE ENCOUNTER
Orders in the system.   Fasting labs
Patient has appointment for physical in August is requesting order for labs as needed will have done 1 week prior to appointment   Please call patient when order in Epic
Spoke to patient, she will have her labs drawn one week before her appointment
Yes...

## 2020-04-01 ENCOUNTER — TELEPHONE (OUTPATIENT)
Dept: INTERNAL MEDICINE CLINIC | Facility: CLINIC | Age: 45
End: 2020-04-01

## 2020-04-01 NOTE — TELEPHONE ENCOUNTER
RE: Linda Lab Navigator Order   Received:  Today   Message Contents   Duog Panchal sent to MD Dr. Tawanda Toledo,     On 4/1/2020, the following referrals for therapy were provided to the patient:     Anna Marie Mayorga, Therapist, Kevin

## 2020-04-16 ENCOUNTER — HOSPITAL ENCOUNTER (OUTPATIENT)
Dept: GENERAL RADIOLOGY | Facility: HOSPITAL | Age: 45
Discharge: HOME | End: 2020-04-16
Attending: INTERNAL MEDICINE
Payer: COMMERCIAL

## 2020-04-16 DIAGNOSIS — R51.9 FACIAL PAIN: ICD-10-CM

## 2020-04-16 PROCEDURE — 70160 X-RAY EXAM OF NASAL BONES: CPT | Performed by: INTERNAL MEDICINE

## 2020-05-06 ENCOUNTER — TELEPHONE (OUTPATIENT)
Dept: INTERNAL MEDICINE CLINIC | Facility: CLINIC | Age: 45
End: 2020-05-06

## 2020-05-06 PROBLEM — Z01.818 PRE-OP TESTING: Status: ACTIVE | Noted: 2020-05-06

## 2020-05-06 RX ORDER — FLUOXETINE HYDROCHLORIDE 20 MG/1
20 CAPSULE ORAL EVERY MORNING
COMMUNITY
Start: 2020-05-01 | End: 2020-06-01

## 2020-05-06 NOTE — TELEPHONE ENCOUNTER
Patient is having knee surgery within the next week, not set date yet. Dr. Emelyn Flores will be doing the surgery. Please advise.

## 2020-05-07 ENCOUNTER — HOSPITAL ENCOUNTER (OUTPATIENT)
Dept: GENERAL RADIOLOGY | Facility: HOSPITAL | Age: 45
Discharge: HOME | End: 2020-05-07
Attending: INTERNAL MEDICINE
Payer: COMMERCIAL

## 2020-05-07 ENCOUNTER — OFFICE VISIT (OUTPATIENT)
Dept: INTERNAL MEDICINE CLINIC | Facility: CLINIC | Age: 45
End: 2020-05-07
Payer: COMMERCIAL

## 2020-05-07 ENCOUNTER — LAB ENCOUNTER (OUTPATIENT)
Dept: LAB | Facility: HOSPITAL | Age: 45
End: 2020-05-07
Attending: INTERNAL MEDICINE
Payer: COMMERCIAL

## 2020-05-07 VITALS
DIASTOLIC BLOOD PRESSURE: 80 MMHG | OXYGEN SATURATION: 98 % | WEIGHT: 184.19 LBS | HEART RATE: 59 BPM | SYSTOLIC BLOOD PRESSURE: 127 MMHG | RESPIRATION RATE: 18 BRPM | BODY MASS INDEX: 34.78 KG/M2 | HEIGHT: 61 IN | TEMPERATURE: 98 F

## 2020-05-07 DIAGNOSIS — D50.0 IRON DEFICIENCY ANEMIA DUE TO CHRONIC BLOOD LOSS: ICD-10-CM

## 2020-05-07 DIAGNOSIS — E78.2 MIXED HYPERLIPIDEMIA: ICD-10-CM

## 2020-05-07 DIAGNOSIS — Z01.818 PRE-OP TESTING: ICD-10-CM

## 2020-05-07 DIAGNOSIS — E61.1 IRON DEFICIENCY: Primary | ICD-10-CM

## 2020-05-07 PROCEDURE — 83540 ASSAY OF IRON: CPT

## 2020-05-07 PROCEDURE — 71046 X-RAY EXAM CHEST 2 VIEWS: CPT | Performed by: INTERNAL MEDICINE

## 2020-05-07 PROCEDURE — 80061 LIPID PANEL: CPT

## 2020-05-07 PROCEDURE — 87641 MR-STAPH DNA AMP PROBE: CPT

## 2020-05-07 PROCEDURE — 82728 ASSAY OF FERRITIN: CPT

## 2020-05-07 PROCEDURE — 81003 URINALYSIS AUTO W/O SCOPE: CPT | Performed by: INTERNAL MEDICINE

## 2020-05-07 PROCEDURE — 84466 ASSAY OF TRANSFERRIN: CPT

## 2020-05-07 PROCEDURE — 93000 ELECTROCARDIOGRAM COMPLETE: CPT | Performed by: INTERNAL MEDICINE

## 2020-05-07 PROCEDURE — 85025 COMPLETE CBC W/AUTO DIFF WBC: CPT

## 2020-05-07 PROCEDURE — 80053 COMPREHEN METABOLIC PANEL: CPT

## 2020-05-07 PROCEDURE — 99214 OFFICE O/P EST MOD 30 MIN: CPT | Performed by: INTERNAL MEDICINE

## 2020-05-07 PROCEDURE — 36415 COLL VENOUS BLD VENIPUNCTURE: CPT

## 2020-05-07 PROCEDURE — 85730 THROMBOPLASTIN TIME PARTIAL: CPT

## 2020-05-07 PROCEDURE — 87640 STAPH A DNA AMP PROBE: CPT

## 2020-05-07 PROCEDURE — 85610 PROTHROMBIN TIME: CPT

## 2020-05-07 RX ORDER — MUPIROCIN CALCIUM 20 MG/G
CREAM TOPICAL
Qty: 30 G | Refills: 0 | Status: SHIPPED | OUTPATIENT
Start: 2020-05-07 | End: 2020-05-27

## 2020-05-07 NOTE — PATIENT INSTRUCTIONS
ASSESSMENT/PLAN:   Iron deficiency  (primary encounter diagnosis) Check blood. Mixed hyperlipidemia Check blood. Iron deficiency anemia due to chronic blood loss Check blood. Pre-op testing Check blood and urine and ECG and CXR.  No motrin,

## 2020-05-27 NOTE — H&P
[facility]    History and Physical    Roseline Richardson Patient Status:  Hospital Outpatient Surgery    1975 MRN [id number]   Location [place] [address] Attending Ian Mcfadden MD   Hosp Day # 0 PCP Billie Kim.  Benton Maher Relation Age of Onset   • Cancer Mother 47        breast cancer    • Breast Cancer Mother 47   • Cancer Maternal Uncle         melanoma   • Diabetes Maternal Grandmother    • Stroke Maternal Grandfather    • Lipids Father         hyperlipidemia   • Diabete patient. Patient understands and elects to proceed with surgery. Consent Signed.       Josselin Lin PA-C  5/27/2020

## 2020-05-28 ENCOUNTER — LAB ENCOUNTER (OUTPATIENT)
Dept: LAB | Facility: HOSPITAL | Age: 45
End: 2020-05-28
Attending: ORTHOPAEDIC SURGERY
Payer: COMMERCIAL

## 2020-05-28 DIAGNOSIS — Z01.818 PRE-OP TESTING: ICD-10-CM

## 2020-05-29 ENCOUNTER — ANESTHESIA (OUTPATIENT)
Dept: SURGERY | Facility: HOSPITAL | Age: 45
End: 2020-05-29
Payer: COMMERCIAL

## 2020-05-29 ENCOUNTER — APPOINTMENT (OUTPATIENT)
Dept: GENERAL RADIOLOGY | Facility: HOSPITAL | Age: 45
End: 2020-05-29
Attending: ORTHOPAEDIC SURGERY
Payer: COMMERCIAL

## 2020-05-29 ENCOUNTER — HOSPITAL ENCOUNTER (OUTPATIENT)
Facility: HOSPITAL | Age: 45
Setting detail: HOSPITAL OUTPATIENT SURGERY
Discharge: HOME | End: 2020-05-29
Attending: ORTHOPAEDIC SURGERY | Admitting: ORTHOPAEDIC SURGERY
Payer: COMMERCIAL

## 2020-05-29 ENCOUNTER — ANESTHESIA EVENT (OUTPATIENT)
Dept: SURGERY | Facility: HOSPITAL | Age: 45
End: 2020-05-29
Payer: COMMERCIAL

## 2020-05-29 VITALS
TEMPERATURE: 98 F | HEIGHT: 61 IN | WEIGHT: 183.5 LBS | SYSTOLIC BLOOD PRESSURE: 124 MMHG | RESPIRATION RATE: 18 BRPM | HEART RATE: 64 BPM | BODY MASS INDEX: 34.64 KG/M2 | OXYGEN SATURATION: 99 % | DIASTOLIC BLOOD PRESSURE: 65 MMHG

## 2020-05-29 DIAGNOSIS — M65.9 SYNOVITIS OF LEFT KNEE: ICD-10-CM

## 2020-05-29 DIAGNOSIS — Z01.818 PRE-OP TESTING: Primary | ICD-10-CM

## 2020-05-29 DIAGNOSIS — R93.7 BONE MARROW EDEMA: ICD-10-CM

## 2020-05-29 DIAGNOSIS — S83.242D ACUTE MEDIAL MENISCUS TEAR OF LEFT KNEE, SUBSEQUENT ENCOUNTER: ICD-10-CM

## 2020-05-29 PROBLEM — S83.242A ACUTE MEDIAL MENISCUS TEAR OF LEFT KNEE: Status: ACTIVE | Noted: 2020-05-29

## 2020-05-29 PROBLEM — M65.962 SYNOVITIS OF LEFT KNEE: Status: ACTIVE | Noted: 2020-05-29

## 2020-05-29 PROCEDURE — 0QSH4ZZ REPOSITION LEFT TIBIA, PERCUTANEOUS ENDOSCOPIC APPROACH: ICD-10-PCS | Performed by: ORTHOPAEDIC SURGERY

## 2020-05-29 PROCEDURE — 0SBD4ZZ EXCISION OF LEFT KNEE JOINT, PERCUTANEOUS ENDOSCOPIC APPROACH: ICD-10-PCS | Performed by: ORTHOPAEDIC SURGERY

## 2020-05-29 PROCEDURE — 76000 FLUOROSCOPY <1 HR PHYS/QHP: CPT | Performed by: ORTHOPAEDIC SURGERY

## 2020-05-29 PROCEDURE — 81025 URINE PREGNANCY TEST: CPT

## 2020-05-29 PROCEDURE — 0QUH4KZ SUPPLEMENT LEFT TIBIA WITH NONAUTOLOGOUS TISSUE SUBSTITUTE, PERCUTANEOUS ENDOSCOPIC APPROACH: ICD-10-PCS | Performed by: ORTHOPAEDIC SURGERY

## 2020-05-29 DEVICE — IMPLANTABLE DEVICE: Type: IMPLANTABLE DEVICE | Site: KNEE | Status: FUNCTIONAL

## 2020-05-29 RX ORDER — ONDANSETRON 2 MG/ML
4 INJECTION INTRAMUSCULAR; INTRAVENOUS ONCE AS NEEDED
Status: DISCONTINUED | OUTPATIENT
Start: 2020-05-29 | End: 2020-05-29

## 2020-05-29 RX ORDER — HYDROCODONE BITARTRATE AND ACETAMINOPHEN 5; 325 MG/1; MG/1
2 TABLET ORAL AS NEEDED
Status: DISCONTINUED | OUTPATIENT
Start: 2020-05-29 | End: 2020-05-29

## 2020-05-29 RX ORDER — HYDROMORPHONE HYDROCHLORIDE 1 MG/ML
0.6 INJECTION, SOLUTION INTRAMUSCULAR; INTRAVENOUS; SUBCUTANEOUS EVERY 5 MIN PRN
Status: DISCONTINUED | OUTPATIENT
Start: 2020-05-29 | End: 2020-05-29

## 2020-05-29 RX ORDER — MORPHINE SULFATE 4 MG/ML
2 INJECTION, SOLUTION INTRAMUSCULAR; INTRAVENOUS EVERY 10 MIN PRN
Status: DISCONTINUED | OUTPATIENT
Start: 2020-05-29 | End: 2020-05-29

## 2020-05-29 RX ORDER — HYDROMORPHONE HYDROCHLORIDE 1 MG/ML
0.4 INJECTION, SOLUTION INTRAMUSCULAR; INTRAVENOUS; SUBCUTANEOUS EVERY 5 MIN PRN
Status: DISCONTINUED | OUTPATIENT
Start: 2020-05-29 | End: 2020-05-29

## 2020-05-29 RX ORDER — DEXAMETHASONE SODIUM PHOSPHATE 4 MG/ML
VIAL (ML) INJECTION AS NEEDED
Status: DISCONTINUED | OUTPATIENT
Start: 2020-05-29 | End: 2020-05-29 | Stop reason: SURG

## 2020-05-29 RX ORDER — MIDAZOLAM HYDROCHLORIDE 1 MG/ML
INJECTION INTRAMUSCULAR; INTRAVENOUS AS NEEDED
Status: DISCONTINUED | OUTPATIENT
Start: 2020-05-29 | End: 2020-05-29 | Stop reason: SURG

## 2020-05-29 RX ORDER — PROCHLORPERAZINE EDISYLATE 5 MG/ML
5 INJECTION INTRAMUSCULAR; INTRAVENOUS ONCE AS NEEDED
Status: DISCONTINUED | OUTPATIENT
Start: 2020-05-29 | End: 2020-05-29

## 2020-05-29 RX ORDER — SODIUM CHLORIDE, SODIUM LACTATE, POTASSIUM CHLORIDE, CALCIUM CHLORIDE 600; 310; 30; 20 MG/100ML; MG/100ML; MG/100ML; MG/100ML
INJECTION, SOLUTION INTRAVENOUS CONTINUOUS
Status: DISCONTINUED | OUTPATIENT
Start: 2020-05-29 | End: 2020-05-29

## 2020-05-29 RX ORDER — IBUPROFEN 200 MG
600 TABLET ORAL EVERY 6 HOURS PRN
Qty: 40 TABLET | Refills: 0 | Status: SHIPPED | OUTPATIENT
Start: 2020-05-29 | End: 2021-01-13

## 2020-05-29 RX ORDER — MORPHINE SULFATE 4 MG/ML
4 INJECTION, SOLUTION INTRAMUSCULAR; INTRAVENOUS EVERY 10 MIN PRN
Status: DISCONTINUED | OUTPATIENT
Start: 2020-05-29 | End: 2020-05-29

## 2020-05-29 RX ORDER — NALOXONE HYDROCHLORIDE 0.4 MG/ML
80 INJECTION, SOLUTION INTRAMUSCULAR; INTRAVENOUS; SUBCUTANEOUS AS NEEDED
Status: DISCONTINUED | OUTPATIENT
Start: 2020-05-29 | End: 2020-05-29

## 2020-05-29 RX ORDER — METOPROLOL TARTRATE 5 MG/5ML
2.5 INJECTION INTRAVENOUS ONCE
Status: DISCONTINUED | OUTPATIENT
Start: 2020-05-29 | End: 2020-05-29

## 2020-05-29 RX ORDER — ACETAMINOPHEN 500 MG
1000 TABLET ORAL ONCE
Status: COMPLETED | OUTPATIENT
Start: 2020-05-29 | End: 2020-05-29

## 2020-05-29 RX ORDER — MORPHINE SULFATE 10 MG/ML
6 INJECTION, SOLUTION INTRAMUSCULAR; INTRAVENOUS EVERY 10 MIN PRN
Status: DISCONTINUED | OUTPATIENT
Start: 2020-05-29 | End: 2020-05-29

## 2020-05-29 RX ORDER — ONDANSETRON 2 MG/ML
4 INJECTION INTRAMUSCULAR; INTRAVENOUS EVERY 6 HOURS PRN
Status: CANCELLED | OUTPATIENT
Start: 2020-05-29

## 2020-05-29 RX ORDER — ROPIVACAINE HYDROCHLORIDE 5 MG/ML
INJECTION, SOLUTION EPIDURAL; INFILTRATION; PERINEURAL AS NEEDED
Status: DISCONTINUED | OUTPATIENT
Start: 2020-05-29 | End: 2020-05-29 | Stop reason: HOSPADM

## 2020-05-29 RX ORDER — HYDROCODONE BITARTRATE AND ACETAMINOPHEN 5; 325 MG/1; MG/1
1 TABLET ORAL AS NEEDED
Status: DISCONTINUED | OUTPATIENT
Start: 2020-05-29 | End: 2020-05-29

## 2020-05-29 RX ORDER — HYDROMORPHONE HYDROCHLORIDE 1 MG/ML
0.2 INJECTION, SOLUTION INTRAMUSCULAR; INTRAVENOUS; SUBCUTANEOUS EVERY 5 MIN PRN
Status: DISCONTINUED | OUTPATIENT
Start: 2020-05-29 | End: 2020-05-29

## 2020-05-29 RX ORDER — GLYCOPYRROLATE 0.2 MG/ML
INJECTION, SOLUTION INTRAMUSCULAR; INTRAVENOUS AS NEEDED
Status: DISCONTINUED | OUTPATIENT
Start: 2020-05-29 | End: 2020-05-29 | Stop reason: SURG

## 2020-05-29 RX ORDER — FAMOTIDINE 20 MG/1
20 TABLET ORAL ONCE
Status: COMPLETED | OUTPATIENT
Start: 2020-05-29 | End: 2020-05-29

## 2020-05-29 RX ORDER — ONDANSETRON 2 MG/ML
INJECTION INTRAMUSCULAR; INTRAVENOUS AS NEEDED
Status: DISCONTINUED | OUTPATIENT
Start: 2020-05-29 | End: 2020-05-29 | Stop reason: SURG

## 2020-05-29 RX ORDER — CEFAZOLIN SODIUM/WATER 2 G/20 ML
2 SYRINGE (ML) INTRAVENOUS ONCE
Status: COMPLETED | OUTPATIENT
Start: 2020-05-29 | End: 2020-05-29

## 2020-05-29 RX ORDER — LIDOCAINE HYDROCHLORIDE 10 MG/ML
INJECTION, SOLUTION EPIDURAL; INFILTRATION; INTRACAUDAL; PERINEURAL AS NEEDED
Status: DISCONTINUED | OUTPATIENT
Start: 2020-05-29 | End: 2020-05-29 | Stop reason: SURG

## 2020-05-29 RX ORDER — TRAMADOL HYDROCHLORIDE 50 MG/1
50 TABLET ORAL EVERY 6 HOURS PRN
Qty: 15 TABLET | Refills: 0 | Status: SHIPPED | OUTPATIENT
Start: 2020-05-29 | End: 2020-10-26

## 2020-05-29 RX ADMIN — CEFAZOLIN SODIUM/WATER 2 G: 2 G/20 ML SYRINGE (ML) INTRAVENOUS at 14:06:00

## 2020-05-29 RX ADMIN — MIDAZOLAM HYDROCHLORIDE 2 MG: 1 INJECTION INTRAMUSCULAR; INTRAVENOUS at 13:55:00

## 2020-05-29 RX ADMIN — GLYCOPYRROLATE 0.1 MG: 0.2 INJECTION, SOLUTION INTRAMUSCULAR; INTRAVENOUS at 14:10:00

## 2020-05-29 RX ADMIN — SODIUM CHLORIDE, SODIUM LACTATE, POTASSIUM CHLORIDE, CALCIUM CHLORIDE: 600; 310; 30; 20 INJECTION, SOLUTION INTRAVENOUS at 14:59:00

## 2020-05-29 RX ADMIN — DEXAMETHASONE SODIUM PHOSPHATE 4 MG: 4 MG/ML VIAL (ML) INJECTION at 14:06:00

## 2020-05-29 RX ADMIN — LIDOCAINE HYDROCHLORIDE 50 MG: 10 INJECTION, SOLUTION EPIDURAL; INFILTRATION; INTRACAUDAL; PERINEURAL at 14:00:00

## 2020-05-29 RX ADMIN — ONDANSETRON 4 MG: 2 INJECTION INTRAMUSCULAR; INTRAVENOUS at 14:06:00

## 2020-05-29 RX ADMIN — SODIUM CHLORIDE, SODIUM LACTATE, POTASSIUM CHLORIDE, CALCIUM CHLORIDE: 600; 310; 30; 20 INJECTION, SOLUTION INTRAVENOUS at 13:52:00

## 2020-05-29 RX ADMIN — SODIUM CHLORIDE, SODIUM LACTATE, POTASSIUM CHLORIDE, CALCIUM CHLORIDE: 600; 310; 30; 20 INJECTION, SOLUTION INTRAVENOUS at 13:55:00

## 2020-05-29 NOTE — BRIEF OP NOTE
Pre-Operative Diagnosis: left knee 1) medial meniscus tear, 2) medial tibial bone edema, 3) medial synovitis     Post-Operative Diagnosis: Same      Procedure Performed: left knee arthroscopy, 1) partial medial meniscectomy, 2) medial tibial subchondroplas

## 2020-05-29 NOTE — ANESTHESIA POSTPROCEDURE EVALUATION
Patient: Kali Settler    Procedure Summary     Date:  05/29/20 Room / Location:  [address] [place] [address] MAIN OR 09 / [facility] OR    Anesthesia Start:  72 Anesthesia Stop:  2584    Procedure:  KNEE ARTHROSCOPY (Left Knee) Diagnosis:  (left knee medial meniscus, medial tibia

## 2020-05-29 NOTE — ANESTHESIA PROCEDURE NOTES
Airway  Date/Time: 5/29/2020 2:02 PM  Urgency: Elective      General Information and Staff    Patient location during procedure: OR  Anesthesiologist: Holly Sharma MD  Resident/CRNA: Willow Yuen CRNA  Performed: CRNA     Indications and Patien

## 2020-05-29 NOTE — ANESTHESIA PREPROCEDURE EVALUATION
Anesthesia PreOp Note    HPI:     Tasha Montemayor is a 45year old female who presents for preoperative consultation requested by: Henry Carrion MD    Date of Surgery: 5/29/2020    Procedure(s):  KNEE ARTHROSCOPY  Indication: left knee medial meniscus, me Cyst of buttocks     cyst on left buttocks, removed   • Cyst of ovary, right    • Decorative tattoo    • Elevated liver enzymes     Fatty liver. • Esophageal reflux 2013    S/P Lap. Nissen fundoplication.    • Hiatal hernia 2013   • High blood pressure Intravenous, Once, Octavia Jimenez MD    No current [facility]-ordered outpatient medications on file.       No Known Allergies    Family History   Problem Relation Age of Onset   • Cancer Mother 47        breast cancer    • Breast Cancer Mother 47   • Cancer M Blood Transfusions: Not Asked        Caffeine Concern: No        Occupational Exposure: Not Asked        Hobby Hazards: Not Asked        Sleep Concern: Not Asked        Stress Concern: Not Asked        Weight Concern: Not Asked        Special Diet: Not A Abdominal                Anesthesia Plan:   ASA:  2  Plan:   General  Airway:  LMA  Post-op Pain Management: IV analgesics  Informed Consent Plan and Risks Discussed With:  Patient      I have informed Reji Montgomery and/or legal guardian or family member of

## 2020-05-29 NOTE — INTERVAL H&P NOTE
Pre-op Diagnosis: left knee medial meniscus, medial tibial bone edema    The above referenced H&P was reviewed by Yazmin Doyle MD on 5/29/2020, the patient was examined and no significant changes have occurred in the patient's condition since the H&P w

## 2020-05-30 NOTE — OPERATIVE REPORT
[facility]    PATIENT'S NAME: Nelwryan Case   ATTENDING PHYSICIAN: [address]  JENNYFER Gutierrez MD   OPERATING PHYSICIAN: Dmitry Gutierrez MD   PATIENT ACCOUNT#:   [id number]    LOCATION:  [facility] 10  MEDICAL RECORD #:   [id number]       DATE OF unremarkable. The patella and trochlea had minimal to no chondromalacia. The medial compartment was unremarkable. The lateral compartment had some mild chondromalacia on the tibial plateau but no meniscal tear and no significant femoral lesion.   The ACL was 3 mL. No specimens were sent. No drains were used. No complications were noted. Postoperative instructions were given in both written and oral form. She will follow up with Dr. Gin Rausch in 1 week's time in the office.   She will be given prescripti

## 2020-06-01 ENCOUNTER — TELEPHONE (OUTPATIENT)
Dept: INTERNAL MEDICINE CLINIC | Facility: CLINIC | Age: 45
End: 2020-06-01

## 2020-06-01 RX ORDER — SIMVASTATIN 20 MG
20 TABLET ORAL NIGHTLY
Qty: 90 TABLET | Refills: 1 | Status: SHIPPED | OUTPATIENT
Start: 2020-06-01 | End: 2021-01-04

## 2020-06-01 RX ORDER — FLUOXETINE HYDROCHLORIDE 20 MG/1
20 CAPSULE ORAL EVERY MORNING
Qty: 90 CAPSULE | Refills: 1 | Status: SHIPPED | OUTPATIENT
Start: 2020-06-01 | End: 2020-08-03

## 2020-06-01 RX ORDER — METOPROLOL SUCCINATE 25 MG/1
12.5 TABLET, EXTENDED RELEASE ORAL DAILY
Qty: 45 TABLET | Refills: 1 | Status: SHIPPED | OUTPATIENT
Start: 2020-06-01 | End: 2020-11-09

## 2020-06-01 RX ORDER — FLUOXETINE HYDROCHLORIDE 20 MG/1
CAPSULE ORAL
Qty: 90 CAPSULE | Refills: 1 | Status: SHIPPED | OUTPATIENT
Start: 2020-06-01 | End: 2021-03-17

## 2020-06-01 NOTE — TELEPHONE ENCOUNTER
Patient is requesting a refill for her prescription. She is out of medication. Please advise.      SIMVASTATIN 20 MG Oral Tab 90 tablet

## 2020-06-01 NOTE — TELEPHONE ENCOUNTER
Patient requesting a refill on her medication as well.   Patient would like a 90 day supply as she is not due to see Dr. Jamil Gonzalez until August.       FLUoxetine HCl 20 MG Oral Cap   5/1/2020    Sig:   Take 20 mg by mouth every morning.       Route:   Oral

## 2020-07-10 ENCOUNTER — HOSPITAL ENCOUNTER (OUTPATIENT)
Dept: MAMMOGRAPHY | Facility: HOSPITAL | Age: 45
Discharge: HOME | End: 2020-07-10
Attending: INTERNAL MEDICINE
Payer: COMMERCIAL

## 2020-07-10 DIAGNOSIS — Z12.39 BREAST CANCER SCREENING: ICD-10-CM

## 2020-07-10 PROCEDURE — 77063 BREAST TOMOSYNTHESIS BI: CPT | Performed by: INTERNAL MEDICINE

## 2020-07-10 PROCEDURE — 77067 SCR MAMMO BI INCL CAD: CPT | Performed by: INTERNAL MEDICINE

## 2020-08-03 ENCOUNTER — OFFICE VISIT (OUTPATIENT)
Dept: INTERNAL MEDICINE CLINIC | Facility: CLINIC | Age: 45
End: 2020-08-03
Payer: COMMERCIAL

## 2020-08-03 ENCOUNTER — TELEPHONE (OUTPATIENT)
Dept: INTERNAL MEDICINE CLINIC | Facility: CLINIC | Age: 45
End: 2020-08-03

## 2020-08-03 VITALS
RESPIRATION RATE: 18 BRPM | WEIGHT: 185.19 LBS | HEART RATE: 64 BPM | DIASTOLIC BLOOD PRESSURE: 72 MMHG | HEIGHT: 61 IN | TEMPERATURE: 97 F | OXYGEN SATURATION: 98 % | SYSTOLIC BLOOD PRESSURE: 112 MMHG | BODY MASS INDEX: 34.96 KG/M2

## 2020-08-03 DIAGNOSIS — R00.2 PALPITATIONS: ICD-10-CM

## 2020-08-03 DIAGNOSIS — R53.83 OTHER FATIGUE: ICD-10-CM

## 2020-08-03 DIAGNOSIS — E61.1 IRON DEFICIENCY: ICD-10-CM

## 2020-08-03 DIAGNOSIS — E78.2 MIXED HYPERLIPIDEMIA: Primary | ICD-10-CM

## 2020-08-03 LAB
BASOPHILS # BLD AUTO: 0.04 X10(3) UL (ref 0–0.2)
BASOPHILS NFR BLD AUTO: 0.4 %
DEPRECATED HBV CORE AB SER IA-ACNC: 18.2 NG/ML (ref 12–240)
DEPRECATED RDW RBC AUTO: 42.9 FL (ref 35.1–46.3)
EOSINOPHIL # BLD AUTO: 0.05 X10(3) UL (ref 0–0.7)
EOSINOPHIL NFR BLD AUTO: 0.5 %
ERYTHROCYTE [DISTWIDTH] IN BLOOD BY AUTOMATED COUNT: 14.3 % (ref 11–15)
HCT VFR BLD AUTO: 39.8 % (ref 35–48)
HGB BLD-MCNC: 12.9 G/DL (ref 12–16)
IMM GRANULOCYTES # BLD AUTO: 0.08 X10(3) UL (ref 0–1)
IMM GRANULOCYTES NFR BLD: 0.8 %
IRON SATURATION: 11 % (ref 15–50)
IRON SERPL-MCNC: 44 UG/DL (ref 50–170)
LYMPHOCYTES # BLD AUTO: 1.78 X10(3) UL (ref 1–4)
LYMPHOCYTES NFR BLD AUTO: 18.8 %
MCH RBC QN AUTO: 27.2 PG (ref 26–34)
MCHC RBC AUTO-ENTMCNC: 32.4 G/DL (ref 31–37)
MCV RBC AUTO: 83.8 FL (ref 80–100)
MONOCYTES # BLD AUTO: 0.67 X10(3) UL (ref 0.1–1)
MONOCYTES NFR BLD AUTO: 7.1 %
NEUTROPHILS # BLD AUTO: 6.87 X10 (3) UL (ref 1.5–7.7)
NEUTROPHILS # BLD AUTO: 6.87 X10(3) UL (ref 1.5–7.7)
NEUTROPHILS NFR BLD AUTO: 72.4 %
PLATELET # BLD AUTO: 278 10(3)UL (ref 150–450)
RBC # BLD AUTO: 4.75 X10(6)UL (ref 3.8–5.3)
TOTAL IRON BINDING CAPACITY: 393 UG/DL (ref 240–450)
TRANSFERRIN SERPL-MCNC: 264 MG/DL (ref 200–360)
TSI SER-ACNC: 1.04 MIU/ML (ref 0.36–3.74)
VIT B12 SERPL-MCNC: 575 PG/ML (ref 193–986)
WBC # BLD AUTO: 9.5 X10(3) UL (ref 4–11)

## 2020-08-03 PROCEDURE — 99214 OFFICE O/P EST MOD 30 MIN: CPT | Performed by: INTERNAL MEDICINE

## 2020-08-03 PROCEDURE — 3008F BODY MASS INDEX DOCD: CPT | Performed by: INTERNAL MEDICINE

## 2020-08-03 PROCEDURE — 93000 ELECTROCARDIOGRAM COMPLETE: CPT | Performed by: INTERNAL MEDICINE

## 2020-08-03 PROCEDURE — 36415 COLL VENOUS BLD VENIPUNCTURE: CPT | Performed by: INTERNAL MEDICINE

## 2020-08-03 PROCEDURE — 3078F DIAST BP <80 MM HG: CPT | Performed by: INTERNAL MEDICINE

## 2020-08-03 PROCEDURE — 3074F SYST BP LT 130 MM HG: CPT | Performed by: INTERNAL MEDICINE

## 2020-08-03 RX ORDER — FLUOXETINE HYDROCHLORIDE 20 MG/1
40 CAPSULE ORAL EVERY MORNING
Qty: 180 CAPSULE | Refills: 1 | Status: SHIPPED | OUTPATIENT
Start: 2020-08-03 | End: 2020-10-26

## 2020-08-03 NOTE — PATIENT INSTRUCTIONS
ASSESSMENT/PLAN:   Mixed hyperlipidemia  (primary encounter diagnosis) Stable. Iron deficiency Check blood. Other fatigue Check blood. Palp. ? Anxiety component. Try to increase prozac to 40 mg in AM. Jairo in 1 week.  Follow up with  F/U and physical 10-4-20.

## 2020-08-03 NOTE — PROGRESS NOTES
HPI:    Patient ID: Robert Yu is a 39year old female. Exercise level: exercises 7 times a  week (walks 30 minutes, stopped after Sx. And trying to get back into)  and has been following low salt diet. Weight has been up but clothes are looser.     Urszula Granado nothing for the symptoms. Her past medical history is significant for anxiety/panic attacks. Pertinent negatives for past medical history include no seizures. Palpitations    This is a new problem. The current episode started more than 1 month ago.  Mariela Eldridge hyperactive. Personal stuff with daughter. She had baby and pt. Found on facebook. Spoke to counselor. All other systems reviewed and are negative.         Current Outpatient Medications   Medication Sig Dispense Refill   • FLUoxetine HCl 20 MG O wrist x2   • FOREARM/WRIST SURGERY UNLISTED Left     wrist tendon surgery   • HEMORRHOIDECTOMY      internal and anal skin tag removeal. Benign.     • HERNIA SURGERY  11/27/13    hiatal hernia    • HERNIA SURGERY  11/27/13   • KNEE ARTHROSCOPY Left 5 they are neither dangerous nor harmful. 2) Understand that what you are experiencing is merely an exaggeration of your normal reactions to stress. 3) Do not fight your feelings or try to wish them away.  The more willing you are to face them, the less int

## 2020-08-03 NOTE — TELEPHONE ENCOUNTER
330 PM on 10-20-20 at [place] for follow up. But she will also need physical after 12-26-20. That if we can schedule now.

## 2020-08-03 NOTE — TELEPHONE ENCOUNTER
Patient wants to schedule a 3 mo follow up in October 2020. There ate no appointments available in October. Dr. hSay Ely please advise on appt date and time.  Thank you

## 2020-08-29 ENCOUNTER — APPOINTMENT (OUTPATIENT)
Dept: LAB | Age: 45
End: 2020-08-29
Attending: INTERNAL MEDICINE
Payer: COMMERCIAL

## 2020-08-29 ENCOUNTER — TELEMEDICINE (OUTPATIENT)
Dept: INTERNAL MEDICINE CLINIC | Facility: CLINIC | Age: 45
End: 2020-08-29
Payer: COMMERCIAL

## 2020-08-29 ENCOUNTER — NURSE TRIAGE (OUTPATIENT)
Dept: INTERNAL MEDICINE CLINIC | Facility: CLINIC | Age: 45
End: 2020-08-29

## 2020-08-29 DIAGNOSIS — J02.9 SORE THROAT: ICD-10-CM

## 2020-08-29 DIAGNOSIS — R61 DIAPHORESIS: ICD-10-CM

## 2020-08-29 DIAGNOSIS — R52 BODY ACHES: ICD-10-CM

## 2020-08-29 DIAGNOSIS — J02.9 SORE THROAT: Primary | ICD-10-CM

## 2020-08-29 PROCEDURE — 99213 OFFICE O/P EST LOW 20 MIN: CPT | Performed by: INTERNAL MEDICINE

## 2020-08-29 NOTE — PROGRESS NOTES
Patient ID: Wade Lay is a 39year old female. Patient presents with:  Sick Call         HISTORY OF PRESENT ILLNESS:   Patient presents for above. This visit is conducted using Telemedicine with live, interactive video and audio (Doximity).   Patient p hernia    • HERNIA SURGERY  11/27/13   • KNEE ARTHROSCOPY Left 5/29/2020    Performed by Sean Luu MD at [address]   • BONNIE LOCALIZATION WIRE 1 SITE LEFT (CPT=19281) Left 07/2017    sebaceous cyst   • OTHER SURGICAL HISTORY      Lasik   • OTHER [facility] Medical: Not on file        Non-medical: Not on file    Tobacco Use      Smoking status: Never Smoker      Smokeless tobacco: Never Used    Substance and Sexual Activity      Alcohol use: No        Alcohol/week: 0.0 standard drinks      Drug use: No      S washroom from the rest of the family. 2. Body aches  · SARS-COV-2 BY PCR (); Future  · As per #1.    3. Diaphoresis  · SARS-COV-2 BY PCR (); Future  · As per #1. Return if symptoms worsen or fail to improve.     Time spent on encounter  10 m detailed in the plan of care above. Coding/billing information is submitted for this visit based on complexity of care and/or time spent for the visit.     Justine Mancilla MD  8/29/2020

## 2020-08-29 NOTE — TELEPHONE ENCOUNTER
Action Requested: Summary for Provider     []  Critical Lab, Recommendations Needed  [] Need Additional Advice  []   FYI    []   Need Orders  [] Need Medications Sent to Pharmacy  []  Other     SUMMARY: Monday, sore throat, progressing to fatigue, body ach

## 2020-08-31 LAB — SARS-COV-2 RNA RESP QL NAA+PROBE: DETECTED

## 2020-09-11 ENCOUNTER — TELEPHONE (OUTPATIENT)
Dept: INTERNAL MEDICINE CLINIC | Facility: CLINIC | Age: 45
End: 2020-09-11

## 2020-09-11 NOTE — TELEPHONE ENCOUNTER
Pt stated that she tested postive on 8/29 and she has quarantine for 14days pt wanted to know if she should be retested. Pt was informed that it is not advised to get retested.  If her s/sx are improving and she has no fever for 24 hr with no medication she

## 2020-09-15 ENCOUNTER — TELEPHONE (OUTPATIENT)
Dept: OBGYN CLINIC | Facility: CLINIC | Age: 45
End: 2020-09-15

## 2020-09-15 NOTE — TELEPHONE ENCOUNTER
pt. was just dx with Covid-19 on 9/2/2020. so pt. just cx her 9/17/20 Annual, so pt. wants to know when is it ok to book new appt?

## 2020-10-01 RX ORDER — OXYBUTYNIN CHLORIDE 5 MG/1
5 TABLET, EXTENDED RELEASE ORAL DAILY
Qty: 90 TABLET | Refills: 0 | Status: SHIPPED | OUTPATIENT
Start: 2020-10-01 | End: 2021-03-27

## 2020-10-09 NOTE — TELEPHONE ENCOUNTER
Refill passed per [facility] protocol.   Refill Protocol Appointment Criteria  · Appointment scheduled in the past 12 months or in the next 3 months  Recent Outpatient Visits            1 month ago School physical exam    [facility], [address],
[id number]

## 2020-10-20 ENCOUNTER — OFFICE VISIT (OUTPATIENT)
Dept: INTERNAL MEDICINE CLINIC | Facility: CLINIC | Age: 45
End: 2020-10-20
Payer: COMMERCIAL

## 2020-10-20 VITALS
RESPIRATION RATE: 18 BRPM | OXYGEN SATURATION: 98 % | WEIGHT: 180 LBS | SYSTOLIC BLOOD PRESSURE: 118 MMHG | HEIGHT: 61 IN | DIASTOLIC BLOOD PRESSURE: 74 MMHG | HEART RATE: 64 BPM | BODY MASS INDEX: 33.99 KG/M2 | TEMPERATURE: 97 F

## 2020-10-20 DIAGNOSIS — Z71.85 VACCINE COUNSELING: ICD-10-CM

## 2020-10-20 DIAGNOSIS — R19.4 CHANGE IN BOWEL HABITS: ICD-10-CM

## 2020-10-20 DIAGNOSIS — R74.8 ELEVATED LIVER ENZYMES: Primary | ICD-10-CM

## 2020-10-20 DIAGNOSIS — D50.0 IRON DEFICIENCY ANEMIA DUE TO CHRONIC BLOOD LOSS: ICD-10-CM

## 2020-10-20 DIAGNOSIS — E78.2 MIXED HYPERLIPIDEMIA: ICD-10-CM

## 2020-10-20 PROCEDURE — 3074F SYST BP LT 130 MM HG: CPT | Performed by: INTERNAL MEDICINE

## 2020-10-20 PROCEDURE — 90471 IMMUNIZATION ADMIN: CPT | Performed by: INTERNAL MEDICINE

## 2020-10-20 PROCEDURE — 99214 OFFICE O/P EST MOD 30 MIN: CPT | Performed by: INTERNAL MEDICINE

## 2020-10-20 PROCEDURE — 90686 IIV4 VACC NO PRSV 0.5 ML IM: CPT | Performed by: INTERNAL MEDICINE

## 2020-10-20 PROCEDURE — 36415 COLL VENOUS BLD VENIPUNCTURE: CPT | Performed by: INTERNAL MEDICINE

## 2020-10-20 PROCEDURE — 3008F BODY MASS INDEX DOCD: CPT | Performed by: INTERNAL MEDICINE

## 2020-10-20 PROCEDURE — 3078F DIAST BP <80 MM HG: CPT | Performed by: INTERNAL MEDICINE

## 2020-10-20 NOTE — PROGRESS NOTES
HPI:    Patient ID: Viktoria Herrera is a 39year old female. Exercise level: somewhat active and has been following low salt diet. Weight has been down. Low appetite. LNMP: 10-12-20 X 3 days, Nl flow. First day is heavy. Change regular pads q30 minutes.  Q history of colon cancer, Crohn's disease, gallstones, GERD, irritable bowel syndrome, pancreatitis, PUD or ulcerative colitis. Review of Systems   Constitutional: Positive for weight loss.  Negative for activity change, appetite change, chills, diap daily. TAKE 1/2 TABLET(S) EVERY DAY BY ORAL ROUTE. 45 tablet 1   • ibuprofen 200 MG Oral Tab Take 3 tablets (600 mg total) by mouth every 6 (six) hours as needed for Pain. Take with food. Stop if stomach upset.  40 tablet 0   • traMADol HCl 50 MG Oral Tab T Grandmother    • Stroke Maternal Grandfather    • Lipids Father         hyperlipidemia   • Diabetes Father    • Other (NSVT) Father       Social History:   Social History    Tobacco Use      Smoking status: Never Smoker      Smokeless tobacco: Never Used respiratory distress. She has no decreased breath sounds. She has no wheezes. She has no rhonchi. She has no rales. She exhibits no tenderness. Abdominal: Soft. Bowel sounds are normal. She exhibits no distension and no mass.  There is no abdominal tender INFLUENZA VACCINE QUAD PRESERVATIVE FREE 0.5 ML  EVALUATE & TREAT, GASTRO (INTERNAL)  US PELVIS W EV (PRL=[id number])

## 2020-10-20 NOTE — PATIENT INSTRUCTIONS
ASSESSMENT/PLAN:   Elevated liver enzymes  (primary encounter diagnosis) Due to fatty liver. Decrease weight. Iron deficiency anemia due to chronic blood loss Check blood. ? Menorrhagia or other causes. Follow up with GI.      Mixed hyperlipidemia Stabl

## 2020-10-23 ENCOUNTER — TELEPHONE (OUTPATIENT)
Dept: INTERNAL MEDICINE CLINIC | Facility: CLINIC | Age: 45
End: 2020-10-23

## 2020-10-23 NOTE — TELEPHONE ENCOUNTER
Spoke with patient (identified name and ), results reviewed and agrees with plan.             Written by Rosa Chou MD on 10/21/2020 10:40 AM  CBC Normal (white blood cells and red blood cells and platelets), iron storage and iron levels are lo

## 2020-10-26 ENCOUNTER — TELEPHONE (OUTPATIENT)
Dept: INTERNAL MEDICINE CLINIC | Facility: CLINIC | Age: 45
End: 2020-10-26

## 2020-10-26 ENCOUNTER — OFFICE VISIT (OUTPATIENT)
Dept: OBGYN CLINIC | Facility: CLINIC | Age: 45
End: 2020-10-26
Payer: COMMERCIAL

## 2020-10-26 VITALS — SYSTOLIC BLOOD PRESSURE: 120 MMHG | BODY MASS INDEX: 34 KG/M2 | DIASTOLIC BLOOD PRESSURE: 70 MMHG | WEIGHT: 180 LBS

## 2020-10-26 DIAGNOSIS — Z01.419 ENCOUNTER FOR WELL WOMAN EXAM WITH ROUTINE GYNECOLOGICAL EXAM: Primary | ICD-10-CM

## 2020-10-26 PROCEDURE — 3078F DIAST BP <80 MM HG: CPT | Performed by: OBSTETRICS & GYNECOLOGY

## 2020-10-26 PROCEDURE — 99396 PREV VISIT EST AGE 40-64: CPT | Performed by: OBSTETRICS & GYNECOLOGY

## 2020-10-26 PROCEDURE — 3074F SYST BP LT 130 MM HG: CPT | Performed by: OBSTETRICS & GYNECOLOGY

## 2020-10-26 NOTE — TELEPHONE ENCOUNTER
This is interesting that it never done in order for medication request for the infusion center and I have been doing this for a long time.   This is the first that they have ever asked for medication order usually fax over the orders and the referral and th

## 2020-10-26 NOTE — TELEPHONE ENCOUNTER
See LAB 10/20/20. Patient calling back, states that someone left a message regarding her test results.   I apologize to the patient, informed that one of the nurse already spoke to her last week regarding the test results and it is the same test results fo

## 2020-10-26 NOTE — TELEPHONE ENCOUNTER
Patient called back stating the infusion center told her that there is no order for iron infusion. RN called infusion center per patient request.   Per Francisco J Bose RN at infusion center, order for Venofer injections 200 mg IV weekly x3.  Need to be entered as a

## 2020-10-27 NOTE — TELEPHONE ENCOUNTER
Attempted to call infusion center was informed they are closed. Will re-open tomorrow at 8 a.m. RN will call tomorrow morning.

## 2020-10-28 NOTE — TELEPHONE ENCOUNTER
Luis Calabrese         10/26/20 4:23 PM  Note     Order for Iron infusion faxed to the infusion center.

## 2020-10-30 NOTE — PROGRESS NOTES
HPI:   Noah Morales is a 39year old female who presents for an annual/pap       Wt Readings from Last 6 Encounters:  10/26/20 : 180 lb (81.6 kg)  10/20/20 : 180 lb (81.6 kg)  08/03/20 : 185 lb 3.2 oz (84 kg)  05/29/20 : 183 lb 8 oz (83.2 kg)  05/07/20 : 1 Colonoscopy : NL (10-14-15) except hemorrhoids.     • Abnormal Pap smear of cervix     CHAYITO 1   • Amenorrhea    • Anxiety    • Cyst of buttocks     cyst on left buttocks, removed   • Cyst of ovary, right    • Decorative tattoo    • Elevated liver enzymes denies chest pain on exertion  GI: denies abdominal pain,denies heartburn  : denies dysuria, vaginal discharge or itching,periods regular   MUSCULOSKELETAL: denies back pain  NEURO: denies headaches  PSYCHE: denies depression or anxiety  HEMATOLOGIC: den

## 2020-11-02 ENCOUNTER — OFFICE VISIT (OUTPATIENT)
Dept: HEMATOLOGY/ONCOLOGY | Facility: HOSPITAL | Age: 45
End: 2020-11-02
Attending: INTERNAL MEDICINE
Payer: COMMERCIAL

## 2020-11-02 VITALS
OXYGEN SATURATION: 100 % | SYSTOLIC BLOOD PRESSURE: 121 MMHG | HEART RATE: 64 BPM | DIASTOLIC BLOOD PRESSURE: 68 MMHG | TEMPERATURE: 98 F | RESPIRATION RATE: 16 BRPM

## 2020-11-02 DIAGNOSIS — E61.1 IRON DEFICIENCY: Primary | ICD-10-CM

## 2020-11-02 PROCEDURE — 96374 THER/PROPH/DIAG INJ IV PUSH: CPT

## 2020-11-02 NOTE — PROGRESS NOTES
Pt arrived for iron infusion. Pt had iron infusions in 2019 and reports tolerating them well. PIV placed to rt ac vein. Venofer given slow IVP with blood return noted throughout.   Pt observed post infusion, vss and offers no complaints and has no questi

## 2020-11-06 ENCOUNTER — OFFICE VISIT (OUTPATIENT)
Dept: HEMATOLOGY/ONCOLOGY | Facility: HOSPITAL | Age: 45
End: 2020-11-06
Attending: INTERNAL MEDICINE
Payer: COMMERCIAL

## 2020-11-06 VITALS
WEIGHT: 178.19 LBS | DIASTOLIC BLOOD PRESSURE: 73 MMHG | OXYGEN SATURATION: 100 % | BODY MASS INDEX: 33.64 KG/M2 | RESPIRATION RATE: 16 BRPM | SYSTOLIC BLOOD PRESSURE: 130 MMHG | HEART RATE: 63 BPM | TEMPERATURE: 98 F | HEIGHT: 60.98 IN

## 2020-11-06 DIAGNOSIS — E61.1 IRON DEFICIENCY: Primary | ICD-10-CM

## 2020-11-06 PROCEDURE — 99244 OFF/OP CNSLTJ NEW/EST MOD 40: CPT | Performed by: INTERNAL MEDICINE

## 2020-11-06 NOTE — PROGRESS NOTES
Hematology Consultation Note    Patient Name: Jorge Tijerina   YOB: 1975   Medical Record Number: [id number]   CSN: [id number]   Consulting Physician: Melinda Lucero MD  Referring Physician(s): Luis Arteaga  Date of Consultation: 11/6/2020 by Johny Chavez MD at [facility] MAIN OR   •       x 3   •   06   • EGD  2013   • ESOPHAGEAL MANOMETRY - INTERNAL  2013    normal   • EXCIS PRIMARY GANGLION WRIST Left     wrist x2   • FOREARM/WRIST SURGERY UNLISTED Left     wri activity        Days per week: Not on file        Minutes per session: Not on file      Stress: Not on file    Relationships      Social connections        Talks on phone: Not on file        Gets together: Not on file        Attends [employer] service: Not 6 (six) hours as needed for Pain. Take with food.  Stop if stomach upset., Disp: 40 tablet, Rfl: 0        Review of Systems:    Constitutional: Negative for anorexia, chills, fatigue, fevers, +intermittent night sweats+day sweats; possible anxiety medicatio edema or calf tenderness. Neurological: Grossly intact.       Labs:    Lab Results   Component Value Date/Time    WBC 10.5 10/20/2020 04:57 PM    RBC 4.86 10/20/2020 04:57 PM    HGB 12.7 10/20/2020 04:57 PM    HCT 40.4 10/20/2020 04:57 PM    MCV 83.1 10/20 months      Napoleon Calvo MD  [facility]  [address], [facility]

## 2020-11-09 ENCOUNTER — OFFICE VISIT (OUTPATIENT)
Dept: HEMATOLOGY/ONCOLOGY | Facility: HOSPITAL | Age: 45
End: 2020-11-09
Attending: INTERNAL MEDICINE
Payer: COMMERCIAL

## 2020-11-09 VITALS
TEMPERATURE: 98 F | SYSTOLIC BLOOD PRESSURE: 124 MMHG | DIASTOLIC BLOOD PRESSURE: 73 MMHG | RESPIRATION RATE: 16 BRPM | HEART RATE: 57 BPM

## 2020-11-09 DIAGNOSIS — E61.1 IRON DEFICIENCY: Primary | ICD-10-CM

## 2020-11-09 PROCEDURE — 96374 THER/PROPH/DIAG INJ IV PUSH: CPT

## 2020-11-09 RX ORDER — METOPROLOL SUCCINATE 25 MG/1
12.5 TABLET, EXTENDED RELEASE ORAL DAILY
Qty: 45 TABLET | Refills: 1 | Status: SHIPPED | OUTPATIENT
Start: 2020-11-09 | End: 2021-03-17

## 2020-11-09 NOTE — PROGRESS NOTES
Noreen to infusion today for 200mg Venofer 2 of 3. She arrives alert and independent. States she felt well and tolerated first dose of Venofer without any issues.     Most recent labs 10/20/20- Hgb/Hct: 12.7/40.4    Fe: 34    TIBC: 431   %sat: 8    Transfer

## 2020-11-11 PROCEDURE — 88305 TISSUE EXAM BY PATHOLOGIST: CPT | Performed by: INTERNAL MEDICINE

## 2020-11-12 ENCOUNTER — HOSPITAL ENCOUNTER (OUTPATIENT)
Dept: ULTRASOUND IMAGING | Facility: HOSPITAL | Age: 45
Discharge: HOME | End: 2020-11-12
Attending: INTERNAL MEDICINE
Payer: COMMERCIAL

## 2020-11-12 DIAGNOSIS — D50.0 IRON DEFICIENCY ANEMIA DUE TO CHRONIC BLOOD LOSS: ICD-10-CM

## 2020-11-12 PROCEDURE — 76856 US EXAM PELVIC COMPLETE: CPT | Performed by: INTERNAL MEDICINE

## 2020-11-12 PROCEDURE — 76830 TRANSVAGINAL US NON-OB: CPT | Performed by: INTERNAL MEDICINE

## 2020-11-13 ENCOUNTER — OFFICE VISIT (OUTPATIENT)
Dept: INTERNAL MEDICINE CLINIC | Facility: CLINIC | Age: 45
End: 2020-11-13
Payer: COMMERCIAL

## 2020-11-13 VITALS
HEART RATE: 61 BPM | SYSTOLIC BLOOD PRESSURE: 120 MMHG | DIASTOLIC BLOOD PRESSURE: 75 MMHG | TEMPERATURE: 98 F | BODY MASS INDEX: 33.99 KG/M2 | HEIGHT: 61 IN | OXYGEN SATURATION: 98 % | RESPIRATION RATE: 18 BRPM | WEIGHT: 180 LBS

## 2020-11-13 DIAGNOSIS — R10.32 LEFT LOWER QUADRANT ABDOMINAL PAIN: Primary | ICD-10-CM

## 2020-11-13 PROCEDURE — 99072 ADDL SUPL MATRL&STAF TM PHE: CPT | Performed by: INTERNAL MEDICINE

## 2020-11-13 PROCEDURE — 3074F SYST BP LT 130 MM HG: CPT | Performed by: INTERNAL MEDICINE

## 2020-11-13 PROCEDURE — 3078F DIAST BP <80 MM HG: CPT | Performed by: INTERNAL MEDICINE

## 2020-11-13 PROCEDURE — 99214 OFFICE O/P EST MOD 30 MIN: CPT | Performed by: INTERNAL MEDICINE

## 2020-11-13 PROCEDURE — 36415 COLL VENOUS BLD VENIPUNCTURE: CPT | Performed by: INTERNAL MEDICINE

## 2020-11-13 PROCEDURE — 3008F BODY MASS INDEX DOCD: CPT | Performed by: INTERNAL MEDICINE

## 2020-11-13 RX ORDER — DICYCLOMINE HCL 20 MG
20 TABLET ORAL DAILY
Qty: 30 TABLET | Refills: 1 | Status: SHIPPED | OUTPATIENT
Start: 2020-11-13 | End: 2021-01-13

## 2020-11-13 NOTE — PROGRESS NOTES
HPI:    Patient ID: Ulysses Prophet is a 39year old female. Exercise level: somewhat active and has been following low salt diet. Weight has been up.     Wt Readings from Last 3 Encounters:  11/13/20 : 180 lb (81.6 kg)  11/06/20 : 178 lb 3.2 oz (80.8 kg) breath, wheezing and stridor. Cardiovascular: Negative for chest pain, palpitations and leg swelling. Gastrointestinal: Positive for abdominal pain and flatus.  Negative for abdominal distention, anal bleeding, anorexia, blood in stool, constipation, d • Elevated liver enzymes     Fatty liver. • Esophageal reflux 2013    S/P Lap. Nissen fundoplication.    • Hiatal hernia 2013   • Incontinence    • Umbilical hernia 3624      Past Surgical History:   Procedure Laterality Date   • BRAVO 96HR - INTERNAL Encounters:  11/13/20 : 120/75  11/09/20 : 124/73  11/06/20 : 130/73    Wt Readings from Last 3 Encounters:  11/13/20 : 180 lb (81.6 kg)  11/06/20 : 178 lb 3.2 oz (80.8 kg)  11/06/20 : 177 lb (80.3 kg)      Physical Exam   Constitutional: She is oriented t person, place, and time. Skin: Skin is warm and dry. She is not diaphoretic. Psychiatric: She has a normal mood and affect. Her behavior is normal. Thought content normal.   Nursing note and vitals reviewed.            ASSESSMENT/PLAN:   Left lower quad

## 2020-11-13 NOTE — PATIENT INSTRUCTIONS
ASSESSMENT/PLAN:   Left lower quadrant abdominal pain  (primary encounter diagnosis) Check blood. Check CT. Try bentyl 10 mg a day. Discussed with patient of side effects and use of these medications. Follow up with gyne.      No orders of the defined typ

## 2020-11-16 ENCOUNTER — OFFICE VISIT (OUTPATIENT)
Dept: HEMATOLOGY/ONCOLOGY | Facility: HOSPITAL | Age: 45
End: 2020-11-16
Attending: INTERNAL MEDICINE
Payer: COMMERCIAL

## 2020-11-16 VITALS
DIASTOLIC BLOOD PRESSURE: 68 MMHG | SYSTOLIC BLOOD PRESSURE: 116 MMHG | HEART RATE: 64 BPM | RESPIRATION RATE: 16 BRPM | TEMPERATURE: 98 F | OXYGEN SATURATION: 97 %

## 2020-11-16 DIAGNOSIS — E61.1 IRON DEFICIENCY: Primary | ICD-10-CM

## 2020-11-16 PROCEDURE — 96374 THER/PROPH/DIAG INJ IV PUSH: CPT

## 2020-11-16 NOTE — PROGRESS NOTES
Noreen to infusion today for 200mg Venofer 3 of 3. She arrives alert and independent. States she feels well and tolerated previous doses of Venofer without any issues. PIV placed to right AC vein with good blood return noted.   Venofer given slow IVP ove

## 2020-11-24 ENCOUNTER — HOSPITAL ENCOUNTER (OUTPATIENT)
Dept: CT IMAGING | Facility: HOSPITAL | Age: 45
Discharge: HOME | End: 2020-11-24
Attending: INTERNAL MEDICINE
Payer: COMMERCIAL

## 2020-11-24 DIAGNOSIS — R10.32 LEFT LOWER QUADRANT ABDOMINAL PAIN: ICD-10-CM

## 2020-11-24 PROCEDURE — 82565 ASSAY OF CREATININE: CPT

## 2020-11-24 PROCEDURE — 74178 CT ABD&PLV WO CNTR FLWD CNTR: CPT | Performed by: INTERNAL MEDICINE

## 2020-11-24 PROCEDURE — 74177 CT ABD & PELVIS W/CONTRAST: CPT | Performed by: INTERNAL MEDICINE

## 2020-11-30 ENCOUNTER — TELEPHONE (OUTPATIENT)
Dept: OBGYN CLINIC | Facility: CLINIC | Age: 45
End: 2020-11-30

## 2020-11-30 NOTE — TELEPHONE ENCOUNTER
Patient called stated has been having a lot of lower pain and found out through her PCP she has ovarian cysts. Made an appointment with Dr Robyn Robles but states its so far out and she is in a lot of pain.      Please advise

## 2020-12-01 ENCOUNTER — OFFICE VISIT (OUTPATIENT)
Dept: OBGYN CLINIC | Facility: CLINIC | Age: 45
End: 2020-12-01
Payer: COMMERCIAL

## 2020-12-01 ENCOUNTER — LAB ENCOUNTER (OUTPATIENT)
Dept: LAB | Facility: HOSPITAL | Age: 45
End: 2020-12-01
Attending: SURGERY
Payer: COMMERCIAL

## 2020-12-01 VITALS — SYSTOLIC BLOOD PRESSURE: 110 MMHG | DIASTOLIC BLOOD PRESSURE: 60 MMHG | BODY MASS INDEX: 33 KG/M2 | WEIGHT: 177 LBS

## 2020-12-01 DIAGNOSIS — R10.32 ABDOMINAL PAIN, LEFT LOWER QUADRANT: ICD-10-CM

## 2020-12-01 DIAGNOSIS — N83.209 CYST OF OVARY, UNSPECIFIED LATERALITY: ICD-10-CM

## 2020-12-01 DIAGNOSIS — K80.10 CALCULUS OF GALLBLADDER WITH CHRONIC CHOLECYSTITIS WITHOUT OBSTRUCTION: ICD-10-CM

## 2020-12-01 DIAGNOSIS — M62.838 SPASM OF ABDOMINAL MUSCLES: Primary | ICD-10-CM

## 2020-12-01 PROCEDURE — 3078F DIAST BP <80 MM HG: CPT | Performed by: OBSTETRICS & GYNECOLOGY

## 2020-12-01 PROCEDURE — 99214 OFFICE O/P EST MOD 30 MIN: CPT | Performed by: OBSTETRICS & GYNECOLOGY

## 2020-12-01 PROCEDURE — 36415 COLL VENOUS BLD VENIPUNCTURE: CPT

## 2020-12-01 PROCEDURE — 3074F SYST BP LT 130 MM HG: CPT | Performed by: OBSTETRICS & GYNECOLOGY

## 2020-12-01 PROCEDURE — 80053 COMPREHEN METABOLIC PANEL: CPT

## 2020-12-01 NOTE — PROGRESS NOTES
HPI:   Rosanne Salvador is a 39year old female who presents for a history of left lower quadrant abdominal wall pain for approximately 2 months.   The patient stated she had a CAT scan which did show calculus in the gallbladder for which she is receiving a caity EVERY DAY BY ORAL ROUTE. 45 tablet 1   • OXYBUTYNIN CHLORIDE ER 5 MG Oral Tablet 24 Hr TAKE 1 TABLET (5 MG TOTAL) BY MOUTH DAILY. 90 tablet 0   • simvastatin 20 MG Oral Tab Take 1 tablet (20 mg total) by mouth nightly.  90 tablet 1   • FLUOXETINE HCL 20 MG breast cancer    • Breast Cancer Mother 47   • Cancer Maternal Uncle         melanoma   • Diabetes Maternal Grandmother    • Stroke Maternal Grandfather    • Lipids Father         hyperlipidemia   • Diabetes Father    • Other (NSVT) Father    • Blee who presents for a history of left lower quadrant abdominal wall pain for approximately 2 months. The patient stated she had a CAT scan which did show calculus in the gallbladder for which she is receiving a surgery with Dr. Stephie Mcknight in December.   The

## 2020-12-04 PROBLEM — M62.838 SPASM OF ABDOMINAL MUSCLES: Status: ACTIVE | Noted: 2020-12-04

## 2020-12-15 ENCOUNTER — LAB REQUISITION (OUTPATIENT)
Dept: LAB | Facility: HOSPITAL | Age: 45
End: 2020-12-15
Payer: COMMERCIAL

## 2020-12-15 DIAGNOSIS — Z01.818 ENCOUNTER FOR OTHER PREPROCEDURAL EXAMINATION: ICD-10-CM

## 2020-12-18 ENCOUNTER — LAB REQUISITION (OUTPATIENT)
Dept: LAB | Facility: HOSPITAL | Age: 45
End: 2020-12-18
Payer: COMMERCIAL

## 2020-12-18 ENCOUNTER — SURGERY CENTER DOCUMENTATION (OUTPATIENT)
Dept: SURGERY | Age: 45
End: 2020-12-18

## 2020-12-18 DIAGNOSIS — K81.9 CHOLECYSTITIS, UNSPECIFIED: ICD-10-CM

## 2020-12-18 PROCEDURE — 88304 TISSUE EXAM BY PATHOLOGIST: CPT | Performed by: SURGERY

## 2020-12-18 NOTE — PROCEDURES
[facility] OPERATIVE REPORT:     PATIENT NAME: Karyn Espinal  : 1975   MRN: [id number]    DATE OF OPERATION:   20    PREOPERATIVE DIAGNOSIS: Chronic Cholecystitis, Cholelithiasis      POSTOPERATIVE DIAGNOSIS: Chronic C the ductotomy and transected. The cystic artery was clipped and transected and the gallbladder was removed from the liver bed using blunt dissection and electrocautery.   The gallbladder was placed in specimen retrieval bag, required crushing of stones and

## 2021-01-04 RX ORDER — SIMVASTATIN 20 MG
20 TABLET ORAL NIGHTLY
Qty: 90 TABLET | Refills: 1 | Status: SHIPPED | OUTPATIENT
Start: 2021-01-04 | End: 2021-03-17

## 2021-01-14 ENCOUNTER — OFFICE VISIT (OUTPATIENT)
Dept: INTERNAL MEDICINE CLINIC | Facility: CLINIC | Age: 46
End: 2021-01-14
Payer: COMMERCIAL

## 2021-01-14 VITALS
BODY MASS INDEX: 33.57 KG/M2 | DIASTOLIC BLOOD PRESSURE: 79 MMHG | HEIGHT: 61 IN | RESPIRATION RATE: 18 BRPM | SYSTOLIC BLOOD PRESSURE: 122 MMHG | TEMPERATURE: 98 F | WEIGHT: 177.81 LBS | HEART RATE: 57 BPM | OXYGEN SATURATION: 98 %

## 2021-01-14 DIAGNOSIS — R31.9 HEMATURIA, UNSPECIFIED TYPE: ICD-10-CM

## 2021-01-14 DIAGNOSIS — R51.9 NONINTRACTABLE HEADACHE, UNSPECIFIED CHRONICITY PATTERN, UNSPECIFIED HEADACHE TYPE: ICD-10-CM

## 2021-01-14 DIAGNOSIS — E78.2 MIXED HYPERLIPIDEMIA: Primary | ICD-10-CM

## 2021-01-14 DIAGNOSIS — Z00.00 ROUTINE GENERAL MEDICAL EXAMINATION AT A HEALTH CARE FACILITY: ICD-10-CM

## 2021-01-14 DIAGNOSIS — D50.0 IRON DEFICIENCY ANEMIA DUE TO CHRONIC BLOOD LOSS: ICD-10-CM

## 2021-01-14 DIAGNOSIS — Z71.85 VACCINE COUNSELING: ICD-10-CM

## 2021-01-14 LAB
ALBUMIN SERPL-MCNC: 4.1 G/DL (ref 3.4–5)
ALBUMIN/GLOB SERPL: 1.3 {RATIO} (ref 1–2)
ALP LIVER SERPL-CCNC: 83 U/L
ALT SERPL-CCNC: 33 U/L
ANION GAP SERPL CALC-SCNC: 4 MMOL/L (ref 0–18)
APPEARANCE: CLEAR
AST SERPL-CCNC: 13 U/L (ref 15–37)
BASOPHILS # BLD AUTO: 0.04 X10(3) UL (ref 0–0.2)
BASOPHILS NFR BLD AUTO: 0.6 %
BILIRUB SERPL-MCNC: 0.4 MG/DL (ref 0.1–2)
BILIRUB UR QL: NEGATIVE
BUN BLD-MCNC: 22 MG/DL (ref 7–18)
BUN/CREAT SERPL: 31 (ref 10–20)
CALCIUM BLD-MCNC: 9.4 MG/DL (ref 8.5–10.1)
CHLORIDE SERPL-SCNC: 109 MMOL/L (ref 98–112)
CHOLEST SMN-MCNC: 139 MG/DL (ref ?–200)
CO2 SERPL-SCNC: 28 MMOL/L (ref 21–32)
COLOR UR: YELLOW
CREAT BLD-MCNC: 0.71 MG/DL
DEPRECATED HBV CORE AB SER IA-ACNC: 79.8 NG/ML
DEPRECATED RDW RBC AUTO: 46.6 FL (ref 35.1–46.3)
EOSINOPHIL # BLD AUTO: 0.05 X10(3) UL (ref 0–0.7)
EOSINOPHIL NFR BLD AUTO: 0.8 %
ERYTHROCYTE [DISTWIDTH] IN BLOOD BY AUTOMATED COUNT: 15.1 % (ref 11–15)
ERYTHROCYTE [SEDIMENTATION RATE] IN BLOOD: 9 MM/HR
GLOBULIN PLAS-MCNC: 3.1 G/DL (ref 2.8–4.4)
GLUCOSE BLD-MCNC: 84 MG/DL (ref 70–99)
GLUCOSE UR-MCNC: NEGATIVE MG/DL
HCT VFR BLD AUTO: 42.2 %
HDLC SERPL-MCNC: 51 MG/DL (ref 40–59)
HGB BLD-MCNC: 13.5 G/DL
IMM GRANULOCYTES # BLD AUTO: 0.04 X10(3) UL (ref 0–1)
IMM GRANULOCYTES NFR BLD: 0.6 %
IRON SATURATION: 21 %
IRON SERPL-MCNC: 83 UG/DL
KETONES UR-MCNC: NEGATIVE MG/DL
LDLC SERPL CALC-MCNC: 69 MG/DL (ref ?–100)
LEUKOCYTE ESTERASE UR QL STRIP.AUTO: NEGATIVE
LYMPHOCYTES # BLD AUTO: 1.54 X10(3) UL (ref 1–4)
LYMPHOCYTES NFR BLD AUTO: 24.8 %
M PROTEIN MFR SERPL ELPH: 7.2 G/DL (ref 6.4–8.2)
MCH RBC QN AUTO: 27.2 PG (ref 26–34)
MCHC RBC AUTO-ENTMCNC: 32 G/DL (ref 31–37)
MCV RBC AUTO: 84.9 FL
MONOCYTES # BLD AUTO: 0.44 X10(3) UL (ref 0.1–1)
MONOCYTES NFR BLD AUTO: 7.1 %
MULTISTIX LOT#: 5077 NUMERIC
NEUTROPHILS # BLD AUTO: 4.11 X10 (3) UL (ref 1.5–7.7)
NEUTROPHILS # BLD AUTO: 4.11 X10(3) UL (ref 1.5–7.7)
NEUTROPHILS NFR BLD AUTO: 66.1 %
NITRITE UR QL STRIP.AUTO: NEGATIVE
NONHDLC SERPL-MCNC: 88 MG/DL (ref ?–130)
OSMOLALITY SERPL CALC.SUM OF ELEC: 295 MOSM/KG (ref 275–295)
PATIENT FASTING Y/N/NP: YES
PATIENT FASTING Y/N/NP: YES
PH UR: 5 [PH] (ref 5–8)
PH, URINE: 6 (ref 4.5–8)
PLATELET # BLD AUTO: 247 10(3)UL (ref 150–450)
POTASSIUM SERPL-SCNC: 4.1 MMOL/L (ref 3.5–5.1)
PROT UR-MCNC: NEGATIVE MG/DL
RBC # BLD AUTO: 4.97 X10(6)UL
RBC #/AREA URNS AUTO: 4 /HPF
SODIUM SERPL-SCNC: 141 MMOL/L (ref 136–145)
SP GR UR STRIP: 1.03 (ref 1–1.03)
SPECIFIC GRAVITY: 1.03 (ref 1–1.03)
TOTAL IRON BINDING CAPACITY: 387 UG/DL (ref 240–450)
TRANSFERRIN SERPL-MCNC: 260 MG/DL (ref 200–360)
TRIGL SERPL-MCNC: 96 MG/DL (ref 30–149)
URINE-COLOR: YELLOW
UROBILINOGEN UR STRIP-ACNC: <2
UROBILINOGEN,SEMI-QN: 0.2 MG/DL (ref 0–1.9)
VLDLC SERPL CALC-MCNC: 19 MG/DL (ref 0–30)
WBC # BLD AUTO: 6.2 X10(3) UL (ref 4–11)
WBC #/AREA URNS AUTO: <1 /HPF

## 2021-01-14 PROCEDURE — 81003 URINALYSIS AUTO W/O SCOPE: CPT | Performed by: INTERNAL MEDICINE

## 2021-01-14 PROCEDURE — 99214 OFFICE O/P EST MOD 30 MIN: CPT | Performed by: INTERNAL MEDICINE

## 2021-01-14 PROCEDURE — 99396 PREV VISIT EST AGE 40-64: CPT | Performed by: INTERNAL MEDICINE

## 2021-01-14 PROCEDURE — 3078F DIAST BP <80 MM HG: CPT | Performed by: INTERNAL MEDICINE

## 2021-01-14 PROCEDURE — 3074F SYST BP LT 130 MM HG: CPT | Performed by: INTERNAL MEDICINE

## 2021-01-14 PROCEDURE — 3008F BODY MASS INDEX DOCD: CPT | Performed by: INTERNAL MEDICINE

## 2021-01-14 PROCEDURE — 36415 COLL VENOUS BLD VENIPUNCTURE: CPT | Performed by: INTERNAL MEDICINE

## 2021-01-14 NOTE — PROGRESS NOTES
HPI:    Patient ID: Manny Landaverde is a 39year old female. Manny Landaverde is a 39year old female who presents for a complete physical exam.   HPI:   Patient presents with:  Physical: annual exam     Patient's last menstrual period was 01/02/2021.    Sympto nightly. 90 tablet 1   • Metoprolol Succinate ER 25 MG Oral Tablet 24 Hr Take 0.5 tablets (12.5 mg total) by mouth daily.  TAKE 1/2 TABLET(S) EVERY DAY BY ORAL ROUTE. 45 tablet 1   • OXYBUTYNIN CHLORIDE ER 5 MG Oral Tablet 24 Hr TAKE 1 TABLET (5 MG TOTAL) B Left     ankle tendon repair   • TUBAL LIGATION  2014      Family History   Problem Relation Age of Onset   • Cancer Mother 47        breast cancer    • Breast Cancer Mother 47   • Cancer Maternal Uncle         melanoma   • Diabetes Maternal Grandmother AM     12/01/2020 10:00 AM    CO2 24.0 12/01/2020 10:00 AM    CREATSERUM 0.75 12/01/2020 10:00 AM    CA 8.9 12/01/2020 10:00 AM    AST 14 (L) 12/01/2020 10:00 AM    ALT 31 12/01/2020 10:00 AM    TSH 1.040 08/03/2020 03:09 PM    T4F 0.80 02/15/2013 10 sinus pain, sneezing, sore throat, tinnitus, trouble swallowing and voice change. Eyes: Negative for blurred vision, photophobia, pain, discharge, redness, itching and visual disturbance. Respiratory: Negative.   Negative for apnea, cough, choking, toshia • FLUOXETINE HCL 20 MG Oral Cap TAKE 1 TABLET (20 MG TOTAL) BY MOUTH DAILY. 90 capsule 1   • HYDROcodone-acetaminophen (NORCO) 5-325 MG Oral Tab Take 1 tablet by mouth every 6 (six) hours as needed for Pain.  No driving and no alcohol while taking this me Grandmother    • Stroke Maternal Grandfather    • Lipids Father         hyperlipidemia   • Diabetes Father    • Other (NSVT) Father    • Bleeding Disorders Neg    • Clotting Disorder Neg       Social History:   Social History    Tobacco Use      Smoking st hemotympanum. No decreased hearing is noted. Nose: Right sinus exhibits no maxillary sinus tenderness and no frontal sinus tenderness. Left sinus exhibits no maxillary sinus tenderness and no frontal sinus tenderness. Pt. wearing mask.    Did not have p has no rhonchi. She has no rales. She exhibits no tenderness. Right breast exhibits no inverted nipple, no mass, no nipple discharge, no skin change and no tenderness.  Left breast exhibits no inverted nipple, no mass, no nipple discharge, no skin change an and affect. Her speech is normal and behavior is normal. Cognition and memory are normal.   Nursing note and vitals reviewed. ASSESSMENT/PLAN:   Mixed hyperlipidemia  (primary encounter diagnosis) Check blood. Vaccine counseling Up to date.

## 2021-01-14 NOTE — PATIENT INSTRUCTIONS
ASSESSMENT/PLAN:   Mixed hyperlipidemia  (primary encounter diagnosis) Check blood. Vaccine counseling Up to date. Hematuria, unspecified type    Routine general medical examination at a health care facility Check urine.      Nonintractable headache

## 2021-01-28 ENCOUNTER — OFFICE VISIT (OUTPATIENT)
Dept: OBGYN CLINIC | Facility: CLINIC | Age: 46
End: 2021-01-28
Payer: COMMERCIAL

## 2021-01-28 VITALS — BODY MASS INDEX: 33 KG/M2 | DIASTOLIC BLOOD PRESSURE: 68 MMHG | SYSTOLIC BLOOD PRESSURE: 122 MMHG | WEIGHT: 177 LBS

## 2021-01-28 DIAGNOSIS — N89.8 VAGINAL ITCHING: Primary | ICD-10-CM

## 2021-01-28 DIAGNOSIS — B37.3 VULVOVAGINITIS DUE TO YEAST: ICD-10-CM

## 2021-01-28 PROCEDURE — 3078F DIAST BP <80 MM HG: CPT | Performed by: OBSTETRICS & GYNECOLOGY

## 2021-01-28 PROCEDURE — 99213 OFFICE O/P EST LOW 20 MIN: CPT | Performed by: OBSTETRICS & GYNECOLOGY

## 2021-01-28 PROCEDURE — 3074F SYST BP LT 130 MM HG: CPT | Performed by: OBSTETRICS & GYNECOLOGY

## 2021-01-28 RX ORDER — FLUCONAZOLE 150 MG/1
150 TABLET ORAL ONCE
Qty: 2 TABLET | Refills: 0 | Status: SHIPPED | OUTPATIENT
Start: 2021-01-28 | End: 2021-01-28

## 2021-01-28 NOTE — PROGRESS NOTES
HPI:   Pasquale Ashton is a 39year old female who presents for a hx of vaginal discharge and itching. Pt stated she had a hx of frequent yeast infections over a year ago, this had resolved. Pt stated this feels similar to a yeast infection.   Pt requesting Amenorrhea    • Anxiety    • Cyst of buttocks     cyst on left buttocks, removed   • Cyst of ovary, right    • Decorative tattoo    • Elevated liver enzymes     Fatty liver. • Esophageal reflux 2013    S/P Lap. Nissen fundoplication.    • Hiatal hernia 2 nasal congestion, sinus pain or ST  LUNGS: denies shortness of breath with exertion  CARDIOVASCULAR: denies chest pain on exertion  GI: denies abdominal pain,denies heartburn  : denies dysuria, vaginal discharge or itching,periods regular   MUSCULOSKELET

## 2021-01-29 ENCOUNTER — TELEPHONE (OUTPATIENT)
Dept: INTERNAL MEDICINE CLINIC | Facility: CLINIC | Age: 46
End: 2021-01-29

## 2021-01-29 NOTE — TELEPHONE ENCOUNTER
Patient calling asking about her repeat UA   Informed that lab was ordered      Provided information to call Central scheduling at [phone]       Patient verbalizes understanding and agrees.

## 2021-01-30 ENCOUNTER — TELEPHONE (OUTPATIENT)
Dept: OBGYN CLINIC | Facility: CLINIC | Age: 46
End: 2021-01-30

## 2021-01-30 ENCOUNTER — PATIENT MESSAGE (OUTPATIENT)
Dept: OBGYN CLINIC | Facility: CLINIC | Age: 46
End: 2021-01-30

## 2021-01-30 LAB
GENITAL VAGINOSIS SCREEN: NEGATIVE
TRICHOMONAS SCREEN: NEGATIVE

## 2021-01-30 RX ORDER — CLOTRIMAZOLE AND BETAMETHASONE DIPROPIONATE 10; .64 MG/G; MG/G
1 CREAM TOPICAL 2 TIMES DAILY
Qty: 1 TUBE | Refills: 1 | Status: SHIPPED | OUTPATIENT
Start: 2021-01-30 | End: 2021-02-04

## 2021-01-30 RX ORDER — FLUCONAZOLE 150 MG/1
150 TABLET ORAL ONCE
Qty: 2 TABLET | Refills: 0 | Status: SHIPPED | OUTPATIENT
Start: 2021-01-30 | End: 2021-01-30

## 2021-01-31 ENCOUNTER — LAB ENCOUNTER (OUTPATIENT)
Dept: LAB | Facility: HOSPITAL | Age: 46
End: 2021-01-31
Attending: INTERNAL MEDICINE
Payer: COMMERCIAL

## 2021-01-31 DIAGNOSIS — R31.9 HEMATURIA, UNSPECIFIED TYPE: ICD-10-CM

## 2021-01-31 DIAGNOSIS — E61.1 IRON DEFICIENCY: ICD-10-CM

## 2021-01-31 LAB
BASOPHILS # BLD AUTO: 0.04 X10(3) UL (ref 0–0.2)
BASOPHILS NFR BLD AUTO: 0.6 %
BILIRUB UR QL: NEGATIVE
CLARITY UR: CLEAR
COLOR UR: YELLOW
DEPRECATED HBV CORE AB SER IA-ACNC: 80.9 NG/ML
DEPRECATED RDW RBC AUTO: 44.5 FL (ref 35.1–46.3)
EOSINOPHIL # BLD AUTO: 0.06 X10(3) UL (ref 0–0.7)
EOSINOPHIL NFR BLD AUTO: 0.8 %
ERYTHROCYTE [DISTWIDTH] IN BLOOD BY AUTOMATED COUNT: 14.6 % (ref 11–15)
GLUCOSE UR-MCNC: NEGATIVE MG/DL
HCT VFR BLD AUTO: 42.4 %
HGB BLD-MCNC: 13.6 G/DL
HGB UR QL STRIP.AUTO: NEGATIVE
HYALINE CASTS #/AREA URNS AUTO: 1 /LPF
IMM GRANULOCYTES # BLD AUTO: 0.07 X10(3) UL (ref 0–1)
IMM GRANULOCYTES NFR BLD: 1 %
IRON SATURATION: 16 %
IRON SERPL-MCNC: 58 UG/DL
KETONES UR-MCNC: NEGATIVE MG/DL
LEUKOCYTE ESTERASE UR QL STRIP.AUTO: NEGATIVE
LYMPHOCYTES # BLD AUTO: 1.68 X10(3) UL (ref 1–4)
LYMPHOCYTES NFR BLD AUTO: 23.4 %
MCH RBC QN AUTO: 26.9 PG (ref 26–34)
MCHC RBC AUTO-ENTMCNC: 32.1 G/DL (ref 31–37)
MCV RBC AUTO: 83.8 FL
MONOCYTES # BLD AUTO: 0.48 X10(3) UL (ref 0.1–1)
MONOCYTES NFR BLD AUTO: 6.7 %
NEUTROPHILS # BLD AUTO: 4.85 X10 (3) UL (ref 1.5–7.7)
NEUTROPHILS # BLD AUTO: 4.85 X10(3) UL (ref 1.5–7.7)
NEUTROPHILS NFR BLD AUTO: 67.5 %
NITRITE UR QL STRIP.AUTO: NEGATIVE
PH UR: 5 [PH] (ref 5–8)
PLATELET # BLD AUTO: 259 10(3)UL (ref 150–450)
PROT UR-MCNC: 30 MG/DL
RBC # BLD AUTO: 5.06 X10(6)UL
RBC #/AREA URNS AUTO: 1 /HPF
SP GR UR STRIP: 1.03 (ref 1–1.03)
TOTAL IRON BINDING CAPACITY: 368 UG/DL (ref 240–450)
TRANSFERRIN SERPL-MCNC: 247 MG/DL (ref 200–360)
UROBILINOGEN UR STRIP-ACNC: <2
WBC # BLD AUTO: 7.2 X10(3) UL (ref 4–11)
WBC #/AREA URNS AUTO: <1 /HPF

## 2021-01-31 PROCEDURE — 36415 COLL VENOUS BLD VENIPUNCTURE: CPT

## 2021-01-31 PROCEDURE — 85025 COMPLETE CBC W/AUTO DIFF WBC: CPT

## 2021-01-31 PROCEDURE — 84466 ASSAY OF TRANSFERRIN: CPT

## 2021-01-31 PROCEDURE — 81001 URINALYSIS AUTO W/SCOPE: CPT

## 2021-01-31 PROCEDURE — 83540 ASSAY OF IRON: CPT

## 2021-01-31 PROCEDURE — 82728 ASSAY OF FERRITIN: CPT

## 2021-02-01 NOTE — TELEPHONE ENCOUNTER
Pt counseled on diflucan and lotrisone, pt to take diflucan now and in 24 hours, and cream bid for 5 days.

## 2021-02-01 NOTE — TELEPHONE ENCOUNTER
----- Message from Darling Hunter RN sent at 2/1/2021  9:45 AM CST -----  Regarding: Prescription Question  Contact: [phone]      ----- Message -----  From: Jessica Madera  Sent: 1/30/2021  12:55 PM CST  To: [facility] Clinical Staff  Subject: Pre

## 2021-02-04 ENCOUNTER — TELEPHONE (OUTPATIENT)
Dept: INTERNAL MEDICINE CLINIC | Facility: CLINIC | Age: 46
End: 2021-02-04

## 2021-02-04 NOTE — TELEPHONE ENCOUNTER
Patient calling for clarification in regards to Dr. Soila Hastings result note as seen below:    Viewed by Katey Hirsch on 2/3/2021  8:35 PM  Written by Andrea Curtis MD on 2/1/2021  9:59 PM  Urine shows protein.  Not sure if this was an early morning speci

## 2021-02-16 ENCOUNTER — OFFICE VISIT (OUTPATIENT)
Dept: HEMATOLOGY/ONCOLOGY | Facility: HOSPITAL | Age: 46
End: 2021-02-16
Attending: INTERNAL MEDICINE
Payer: COMMERCIAL

## 2021-02-16 ENCOUNTER — LAB ENCOUNTER (OUTPATIENT)
Dept: LAB | Facility: HOSPITAL | Age: 46
End: 2021-02-16
Attending: INTERNAL MEDICINE
Payer: COMMERCIAL

## 2021-02-16 VITALS
OXYGEN SATURATION: 97 % | WEIGHT: 179.63 LBS | HEART RATE: 62 BPM | RESPIRATION RATE: 16 BRPM | HEIGHT: 60.98 IN | DIASTOLIC BLOOD PRESSURE: 69 MMHG | SYSTOLIC BLOOD PRESSURE: 120 MMHG | TEMPERATURE: 98 F | BODY MASS INDEX: 33.91 KG/M2

## 2021-02-16 DIAGNOSIS — R80.9 PROTEINURIA, UNSPECIFIED TYPE: ICD-10-CM

## 2021-02-16 DIAGNOSIS — E61.1 IRON DEFICIENCY: Primary | ICD-10-CM

## 2021-02-16 LAB
BILIRUB UR QL: NEGATIVE
COLOR UR: YELLOW
GLUCOSE UR-MCNC: NEGATIVE MG/DL
HGB UR QL STRIP.AUTO: NEGATIVE
KETONES UR-MCNC: NEGATIVE MG/DL
LEUKOCYTE ESTERASE UR QL STRIP.AUTO: NEGATIVE
NITRITE UR QL STRIP.AUTO: NEGATIVE
PH UR: 5 [PH] (ref 5–8)
PROT UR-MCNC: NEGATIVE MG/DL
SP GR UR STRIP: 1.03 (ref 1–1.03)
UROBILINOGEN UR STRIP-ACNC: <2

## 2021-02-16 PROCEDURE — 99213 OFFICE O/P EST LOW 20 MIN: CPT | Performed by: INTERNAL MEDICINE

## 2021-02-16 PROCEDURE — 81003 URINALYSIS AUTO W/O SCOPE: CPT

## 2021-02-16 NOTE — PROGRESS NOTES
Hematology Progress Note    Patient Name: Rosanne Salvador   YOB: 1975   Medical Record Number: [id number]   CSN: [id number]   Consulting Physician: Alan Fuchs MD  Referring Physician(s): Taylor Arteaga  Date of Visit: 2/16/2021    Chief co except hemorrhoids. • Abnormal Pap smear of cervix     CHAYITO 1   • Amenorrhea    • Anxiety    • Cyst of buttocks     cyst on left buttocks, removed   • Cyst of ovary, right    • Decorative tattoo    • Elevated liver enzymes     Fatty liver.     • Esophagea Occupational History      Occupation: Retired. Was dental ass't.      Social Needs      Financial resource strain: Not on file      Food insecurity        Worry: Not on file        Inability: Not on file      Transportation needs        Medical: Not on jeff Allergies    Current Medications:    •  simvastatin 20 MG Oral Tab, Take 1 tablet (20 mg total) by mouth nightly., Disp: 90 tablet, Rfl: 1    •  HYDROcodone-acetaminophen (NORCO) 5-325 MG Oral Tab, Take 1 tablet by mouth every 6 (six) hours as needed for P Examination:    General: Patient is alert and oriented x 3, not in acute distress. Psych:  Friendly, cooperative with appropriate questions and responses  HEENT: EOMs intact. Oropharynx is clear. Neck: No palpable lymphadenopathy. Neck is supple.   Lymph sweating day and night presenting for follow up of previously lowered iron levels but without signs of anemia.     Plan:    1.) Lowered iron levels--likely related to natural blood loss from menstrual cycles monthly    --Patient requested consultation to be

## 2021-02-18 ENCOUNTER — LAB ENCOUNTER (OUTPATIENT)
Dept: LAB | Facility: HOSPITAL | Age: 46
End: 2021-02-18
Attending: INTERNAL MEDICINE
Payer: COMMERCIAL

## 2021-02-18 DIAGNOSIS — R80.9 PROTEINURIA, UNSPECIFIED TYPE: ICD-10-CM

## 2021-02-18 LAB
M PROTEIN 24H UR ELPH-MRATE: 100.1 MG/24 HR (ref ?–149.1)
SPECIMEN VOL UR: 1100 ML

## 2021-02-18 PROCEDURE — 84156 ASSAY OF PROTEIN URINE: CPT

## 2021-03-11 ENCOUNTER — TELEPHONE (OUTPATIENT)
Dept: INTERNAL MEDICINE CLINIC | Facility: CLINIC | Age: 46
End: 2021-03-11

## 2021-03-11 DIAGNOSIS — Z12.31 BREAST CANCER SCREENING BY MAMMOGRAM: Primary | ICD-10-CM

## 2021-03-11 NOTE — TELEPHONE ENCOUNTER
Dr. Chen Mcgregor, patient is requesting a mammogram order. Last mammogram was completed 06/28/1975. Please advise on order.        Impression   CONCLUSION:         BI-RADS CATEGORY:     DIAGNOSTIC CATEGORY 1--NEGATIVE ASSESSMENT.         RECOMMENDATIONS:   ROUTIN

## 2021-03-17 RX ORDER — METOPROLOL SUCCINATE 25 MG/1
12.5 TABLET, EXTENDED RELEASE ORAL DAILY
Qty: 45 TABLET | Refills: 1 | Status: SHIPPED | OUTPATIENT
Start: 2021-03-17 | End: 2022-03-10

## 2021-03-17 RX ORDER — SIMVASTATIN 20 MG
20 TABLET ORAL NIGHTLY
Qty: 90 TABLET | Refills: 1 | Status: SHIPPED | OUTPATIENT
Start: 2021-03-17 | End: 2021-03-26

## 2021-03-17 RX ORDER — FLUOXETINE HYDROCHLORIDE 20 MG/1
CAPSULE ORAL
Qty: 90 CAPSULE | Refills: 1 | Status: SHIPPED | OUTPATIENT
Start: 2021-03-17 | End: 2022-05-10

## 2021-03-26 RX ORDER — SIMVASTATIN 20 MG
20 TABLET ORAL NIGHTLY
Qty: 90 TABLET | Refills: 1 | Status: SHIPPED | OUTPATIENT
Start: 2021-03-26 | End: 2022-03-10

## 2021-03-27 DIAGNOSIS — N32.81 DETRUSOR INSTABILITY: Primary | ICD-10-CM

## 2021-03-27 RX ORDER — OXYBUTYNIN CHLORIDE 5 MG/1
5 TABLET, EXTENDED RELEASE ORAL DAILY
Qty: 90 TABLET | Refills: 0 | OUTPATIENT
Start: 2021-03-27

## 2021-03-29 RX ORDER — OXYBUTYNIN CHLORIDE 5 MG/1
5 TABLET, EXTENDED RELEASE ORAL DAILY
Qty: 90 TABLET | Refills: 0 | OUTPATIENT
Start: 2021-03-29

## 2021-03-29 RX ORDER — OXYBUTYNIN CHLORIDE 5 MG/1
5 TABLET, EXTENDED RELEASE ORAL DAILY
Qty: 30 TABLET | Refills: 0 | Status: SHIPPED | OUTPATIENT
Start: 2021-03-29 | End: 2021-04-16 | Stop reason: DRUGHIGH

## 2021-03-29 RX ORDER — SIMVASTATIN 20 MG
20 TABLET ORAL NIGHTLY
Qty: 90 TABLET | Refills: 1 | OUTPATIENT
Start: 2021-03-29

## 2021-04-16 ENCOUNTER — OFFICE VISIT (OUTPATIENT)
Dept: UROLOGY | Facility: HOSPITAL | Age: 46
End: 2021-04-16
Attending: OBSTETRICS & GYNECOLOGY
Payer: COMMERCIAL

## 2021-04-16 VITALS
SYSTOLIC BLOOD PRESSURE: 126 MMHG | DIASTOLIC BLOOD PRESSURE: 78 MMHG | RESPIRATION RATE: 16 BRPM | WEIGHT: 179 LBS | BODY MASS INDEX: 34 KG/M2

## 2021-04-16 DIAGNOSIS — R35.1 NOCTURIA: ICD-10-CM

## 2021-04-16 DIAGNOSIS — N81.84 PELVIC MUSCLE WASTING: ICD-10-CM

## 2021-04-16 DIAGNOSIS — N39.41 URGE INCONTINENCE: ICD-10-CM

## 2021-04-16 DIAGNOSIS — N32.81 DETRUSOR INSTABILITY: Primary | ICD-10-CM

## 2021-04-16 PROCEDURE — 99212 OFFICE O/P EST SF 10 MIN: CPT

## 2021-04-16 RX ORDER — OXYBUTYNIN CHLORIDE 10 MG/1
10 TABLET, EXTENDED RELEASE ORAL DAILY
Qty: 90 TABLET | Refills: 3 | Status: SHIPPED | OUTPATIENT
Start: 2021-04-16

## 2021-04-16 NOTE — PROGRESS NOTES
Patient presents to follow up DO/UUI    She is currently using oxybutynin ER 5mg     She reports minimal improvement and reports no dry mouth or constipation  Denies other significant side effects   Feels bladder sx are worse  Wants to talk about sienna

## 2021-05-17 ENCOUNTER — VIRTUAL PHONE E/M (OUTPATIENT)
Dept: UROLOGY | Facility: HOSPITAL | Age: 46
End: 2021-05-17
Attending: STUDENT IN AN ORGANIZED HEALTH CARE EDUCATION/TRAINING PROGRAM
Payer: COMMERCIAL

## 2021-05-17 VITALS — HEIGHT: 60.98 IN | BODY MASS INDEX: 33.79 KG/M2 | WEIGHT: 179 LBS

## 2021-05-17 DIAGNOSIS — N32.81 DETRUSOR INSTABILITY: Primary | ICD-10-CM

## 2021-05-17 DIAGNOSIS — N39.41 URGE INCONTINENCE: ICD-10-CM

## 2021-05-17 NOTE — PROGRESS NOTES
Patient presents to follow up UUI/DO  Given circumstances surrounding COVID-19 this visit is being conducted as a televisit with pt's consent.     She is currently using Oxybutynin ER 10 mg    She reports 80% improvement and reports some dry mouth (tolerabl

## 2021-07-13 ENCOUNTER — HOSPITAL ENCOUNTER (OUTPATIENT)
Dept: MAMMOGRAPHY | Facility: HOSPITAL | Age: 46
Discharge: HOME | End: 2021-07-13
Attending: INTERNAL MEDICINE
Payer: COMMERCIAL

## 2021-07-13 DIAGNOSIS — Z12.31 BREAST CANCER SCREENING BY MAMMOGRAM: ICD-10-CM

## 2021-07-13 PROCEDURE — 77067 SCR MAMMO BI INCL CAD: CPT | Performed by: INTERNAL MEDICINE

## 2021-07-13 PROCEDURE — 77063 BREAST TOMOSYNTHESIS BI: CPT | Performed by: INTERNAL MEDICINE

## 2021-09-03 ENCOUNTER — NURSE TRIAGE (OUTPATIENT)
Dept: INTERNAL MEDICINE CLINIC | Facility: CLINIC | Age: 46
End: 2021-09-03

## 2021-09-03 ENCOUNTER — OFFICE VISIT (OUTPATIENT)
Dept: INTERNAL MEDICINE CLINIC | Facility: CLINIC | Age: 46
End: 2021-09-03
Payer: COMMERCIAL

## 2021-09-03 ENCOUNTER — LAB ENCOUNTER (OUTPATIENT)
Dept: LAB | Facility: HOSPITAL | Age: 46
End: 2021-09-03
Attending: INTERNAL MEDICINE
Payer: COMMERCIAL

## 2021-09-03 ENCOUNTER — HOSPITAL ENCOUNTER (OUTPATIENT)
Dept: GENERAL RADIOLOGY | Facility: HOSPITAL | Age: 46
Discharge: HOME | End: 2021-09-03
Attending: INTERNAL MEDICINE
Payer: COMMERCIAL

## 2021-09-03 VITALS
SYSTOLIC BLOOD PRESSURE: 117 MMHG | HEIGHT: 61 IN | BODY MASS INDEX: 32.28 KG/M2 | HEART RATE: 58 BPM | DIASTOLIC BLOOD PRESSURE: 72 MMHG | WEIGHT: 171 LBS

## 2021-09-03 DIAGNOSIS — R10.9 FLANK PAIN: ICD-10-CM

## 2021-09-03 DIAGNOSIS — M54.50 ACUTE LOW BACK PAIN, UNSPECIFIED BACK PAIN LATERALITY, UNSPECIFIED WHETHER SCIATICA PRESENT: Primary | ICD-10-CM

## 2021-09-03 PROCEDURE — 3078F DIAST BP <80 MM HG: CPT | Performed by: INTERNAL MEDICINE

## 2021-09-03 PROCEDURE — 87086 URINE CULTURE/COLONY COUNT: CPT

## 2021-09-03 PROCEDURE — 99213 OFFICE O/P EST LOW 20 MIN: CPT | Performed by: INTERNAL MEDICINE

## 2021-09-03 PROCEDURE — 3074F SYST BP LT 130 MM HG: CPT | Performed by: INTERNAL MEDICINE

## 2021-09-03 PROCEDURE — 74018 RADEX ABDOMEN 1 VIEW: CPT | Performed by: INTERNAL MEDICINE

## 2021-09-03 PROCEDURE — 3008F BODY MASS INDEX DOCD: CPT | Performed by: INTERNAL MEDICINE

## 2021-09-03 RX ORDER — CYCLOBENZAPRINE HCL 5 MG
5 TABLET ORAL NIGHTLY PRN
Qty: 20 TABLET | Refills: 0 | Status: SHIPPED | OUTPATIENT
Start: 2021-09-03 | End: 2021-10-08 | Stop reason: ALTCHOICE

## 2021-09-03 NOTE — PROGRESS NOTES
HPI/Subjective:     Patient ID: Etelvina Moore is a 42year old female. She came today complaining of lower back pain. According to her this is ongoing for 3 days. It started all of a sudden.   It is not  radiating not associated with any numbness, tinglin not nervous/anxious. Current Outpatient Medications   Medication Sig Dispense Refill   • cyclobenzaprine 5 MG Oral Tab Take 1 tablet (5 mg total) by mouth nightly as needed for Muscle spasms.  20 tablet 0   • Oxybutynin Chloride ER 10 MG Oral Tablet 24 LOCALIZATION WIRE 1 SITE LEFT (NAT=[id number]) Left 07/2017    sebaceous cyst   • OTHER SURGICAL HISTORY      Lasik   • OTHER SURGICAL HISTORY Left     ankle tendon repair   • TUBAL LIGATION  2014      Family History   Problem Relation Age of Onset   • Cancer M Cardiovascular:      Rate and Rhythm: Normal rate and regular rhythm. Heart sounds: Normal heart sounds. No murmur heard. No friction rub. No gallop.     Pulmonary:      Effort: Pulmonary effort is normal. No accessory muscle usage or respiratory dis Oral Tab 20 tablet 0     Sig: Take 1 tablet (5 mg total) by mouth nightly as needed for Muscle spasms.        Imaging & Referrals:  None

## 2021-09-03 NOTE — TELEPHONE ENCOUNTER
Action Requested: Summary for Provider     []  Critical Lab, Recommendations Needed  [] Need Additional Advice  [x]   FYI    []   Need Orders  [] Need Medications Sent to Pharmacy  []  Other     SUMMARY: low back/flank pain, Appointment made.      Reason fo

## 2021-10-08 ENCOUNTER — NURSE TRIAGE (OUTPATIENT)
Dept: INTERNAL MEDICINE CLINIC | Facility: CLINIC | Age: 46
End: 2021-10-08

## 2021-10-08 ENCOUNTER — OFFICE VISIT (OUTPATIENT)
Dept: INTERNAL MEDICINE CLINIC | Facility: CLINIC | Age: 46
End: 2021-10-08
Payer: COMMERCIAL

## 2021-10-08 VITALS
DIASTOLIC BLOOD PRESSURE: 62 MMHG | HEART RATE: 87 BPM | HEIGHT: 61 IN | SYSTOLIC BLOOD PRESSURE: 120 MMHG | OXYGEN SATURATION: 98 % | WEIGHT: 166 LBS | BODY MASS INDEX: 31.34 KG/M2

## 2021-10-08 DIAGNOSIS — M54.50 ACUTE LOW BACK PAIN, UNSPECIFIED BACK PAIN LATERALITY, UNSPECIFIED WHETHER SCIATICA PRESENT: ICD-10-CM

## 2021-10-08 DIAGNOSIS — R10.9 FLANK PAIN: Primary | ICD-10-CM

## 2021-10-08 PROCEDURE — 36415 COLL VENOUS BLD VENIPUNCTURE: CPT | Performed by: NURSE PRACTITIONER

## 2021-10-08 PROCEDURE — 3078F DIAST BP <80 MM HG: CPT | Performed by: NURSE PRACTITIONER

## 2021-10-08 PROCEDURE — 81003 URINALYSIS AUTO W/O SCOPE: CPT | Performed by: NURSE PRACTITIONER

## 2021-10-08 PROCEDURE — 3008F BODY MASS INDEX DOCD: CPT | Performed by: NURSE PRACTITIONER

## 2021-10-08 PROCEDURE — 3074F SYST BP LT 130 MM HG: CPT | Performed by: NURSE PRACTITIONER

## 2021-10-08 PROCEDURE — 99214 OFFICE O/P EST MOD 30 MIN: CPT | Performed by: NURSE PRACTITIONER

## 2021-10-08 RX ORDER — TIZANIDINE 4 MG/1
4 TABLET ORAL NIGHTLY
Qty: 5 TABLET | Refills: 0 | Status: SHIPPED | OUTPATIENT
Start: 2021-10-08 | End: 2021-10-13

## 2021-10-08 RX ORDER — METHYLPREDNISOLONE 4 MG/1
TABLET ORAL
Qty: 21 EACH | Refills: 0 | Status: SHIPPED | OUTPATIENT
Start: 2021-10-08 | End: 2021-10-28 | Stop reason: ALTCHOICE

## 2021-10-08 NOTE — PROGRESS NOTES
HPI:    Patient ID: Sofie Schroeder is a 42year old female. Exercise level:walking 2 days week 15-20min and  HAS NOT been following low salt diet. Weight decreased. 13lb in 5 months.     Wt Readings from Last 3 Encounters:  10/08/21 : 166 lb (75.3 kg)  09/ exercise. She has tried muscle relaxant for the symptoms. The treatment provided mild relief. Review of Systems   Constitutional: Negative. Negative for fever and weight loss. HENT: Negative. Eyes: Negative. Respiratory: Negative.     Jasmin Velasco Abdominal pain in female     LLQ. Colonoscopy : NL (10-14-15) except hemorrhoids.     • Abnormal Pap smear of cervix     CHAYITO 1   • Amenorrhea    • Anxiety    • Cyst of buttocks     cyst on left buttocks, removed   • Cyst of ovary, right    • Decorative tatt LMP 09/01/2021 (Exact Date)   SpO2 98%   BMI 31.37 kg/m²   BP Readings from Last 3 Encounters:  10/08/21 : 120/62  09/03/21 : 117/72  04/16/21 : 126/78    Wt Readings from Last 3 Encounters:  10/08/21 : 166 lb (75.3 kg)  09/03/21 : 171 lb (77.6 kg)  05/17/ edema, spasms, tenderness or bony tenderness. Normal range of motion. Back:    Skin:     General: Skin is warm and dry. Neurological:      Mental Status: She is alert and oriented to person, place, and time.    Psychiatric:         Attention and Pe

## 2021-10-08 NOTE — PATIENT INSTRUCTIONS
ASSESSMENT/PLAN:   Flank pain  (primary encounter diagnosis)  Order CT scan  Increase fluids  Check urine    Acute low back pain, unspecified back pain laterality, unspecified whether sciatica present  Start Medrol Dosepak take with food.   Take tizanidin

## 2021-10-08 NOTE — TELEPHONE ENCOUNTER
Pt stated she was seen 9/3/21 Dr Gisell Diaz  c/c back pain,stated medication helped but  continue to have a dull pain, lower back and middle around kidney area, sensitive to touch    Advised no appt available, appt made with Matilda Juárez today, Pt asking to be f/u n

## 2021-10-11 ENCOUNTER — APPOINTMENT (OUTPATIENT)
Dept: CT IMAGING | Facility: HOSPITAL | Age: 46
End: 2021-10-11
Attending: EMERGENCY MEDICINE
Payer: COMMERCIAL

## 2021-10-11 ENCOUNTER — HOSPITAL ENCOUNTER (EMERGENCY)
Facility: HOSPITAL | Age: 46
Discharge: HOME | End: 2021-10-11
Attending: EMERGENCY MEDICINE
Payer: COMMERCIAL

## 2021-10-11 VITALS
BODY MASS INDEX: 31 KG/M2 | DIASTOLIC BLOOD PRESSURE: 74 MMHG | RESPIRATION RATE: 14 BRPM | HEART RATE: 67 BPM | SYSTOLIC BLOOD PRESSURE: 122 MMHG | OXYGEN SATURATION: 100 % | TEMPERATURE: 98 F | WEIGHT: 166 LBS

## 2021-10-11 DIAGNOSIS — D72.829 LEUKOCYTOSIS, UNSPECIFIED TYPE: ICD-10-CM

## 2021-10-11 DIAGNOSIS — R10.9 FLANK PAIN: Primary | ICD-10-CM

## 2021-10-11 DIAGNOSIS — N30.00 ACUTE CYSTITIS WITHOUT HEMATURIA: ICD-10-CM

## 2021-10-11 PROCEDURE — 80048 BASIC METABOLIC PNL TOTAL CA: CPT | Performed by: EMERGENCY MEDICINE

## 2021-10-11 PROCEDURE — 71260 CT THORAX DX C+: CPT | Performed by: EMERGENCY MEDICINE

## 2021-10-11 PROCEDURE — 81001 URINALYSIS AUTO W/SCOPE: CPT | Performed by: EMERGENCY MEDICINE

## 2021-10-11 PROCEDURE — 74177 CT ABD & PELVIS W/CONTRAST: CPT | Performed by: EMERGENCY MEDICINE

## 2021-10-11 PROCEDURE — 80076 HEPATIC FUNCTION PANEL: CPT | Performed by: EMERGENCY MEDICINE

## 2021-10-11 PROCEDURE — 96365 THER/PROPH/DIAG IV INF INIT: CPT

## 2021-10-11 PROCEDURE — 99284 EMERGENCY DEPT VISIT MOD MDM: CPT

## 2021-10-11 PROCEDURE — 85025 COMPLETE CBC W/AUTO DIFF WBC: CPT | Performed by: EMERGENCY MEDICINE

## 2021-10-11 PROCEDURE — 83690 ASSAY OF LIPASE: CPT | Performed by: EMERGENCY MEDICINE

## 2021-10-11 PROCEDURE — 96375 TX/PRO/DX INJ NEW DRUG ADDON: CPT

## 2021-10-11 RX ORDER — MORPHINE SULFATE 2 MG/ML
2 INJECTION, SOLUTION INTRAMUSCULAR; INTRAVENOUS ONCE
Status: COMPLETED | OUTPATIENT
Start: 2021-10-11 | End: 2021-10-11

## 2021-10-11 RX ORDER — CEFADROXIL 500 MG/1
500 CAPSULE ORAL 2 TIMES DAILY
Qty: 20 CAPSULE | Refills: 0 | Status: SHIPPED | OUTPATIENT
Start: 2021-10-11 | End: 2021-10-21

## 2021-10-11 RX ORDER — HYDROCODONE BITARTRATE AND ACETAMINOPHEN 5; 325 MG/1; MG/1
1 TABLET ORAL EVERY 4 HOURS PRN
Qty: 10 TABLET | Refills: 0 | Status: SHIPPED | OUTPATIENT
Start: 2021-10-11

## 2021-10-11 NOTE — ED INITIAL ASSESSMENT (HPI)
Presents with lower back pain x 1 month, primarily right lower  Patient notes she has CT abd + pelvis scheduled for Thursday by doctor  No urinary symptoms.

## 2021-10-12 NOTE — ED PROVIDER NOTES
Patient Seen in: [facility] AND [facility] Emergency Department      History   Patient presents with:  Back Pain    Stated Complaint: back pain     Subjective:   HPI    Patient presents the emergency department complaining of right-sided back pain.   She describe LIGATION  2014                Social History    Tobacco Use      Smoking status: Never Smoker      Smokeless tobacco: Never Used    Vaping Use      Vaping Use: Never used    Alcohol use: No      Alcohol/week: 0.0 standard drinks    Drug use:  No refill takes less than 2 seconds. Findings: No rash. Neurological:      General: No focal deficit present. Mental Status: She is alert and oriented to person, place, and time. Cranial Nerves: No cranial nerve deficit.       Sensory: No sens Ama Angulo MD on 10/11/2021 at 9:23 PM     Finalized by (CST): Ama Angulo MD on 10/11/2021 at 9:26 PM          CT ABDOMEN PELVIS IV CONTRAST, NO ORAL (ER)    Result Date: 10/11/2021  CONCLUSION:  1.  Minimal nonspecific dilatation

## 2021-10-27 ENCOUNTER — TELEPHONE (OUTPATIENT)
Dept: INTERNAL MEDICINE CLINIC | Facility: CLINIC | Age: 46
End: 2021-10-27

## 2021-10-27 NOTE — TELEPHONE ENCOUNTER
Pt went to the ER on 10/11/21 low back pain. Pt was given antibiotics and Norco. Also had CT done and was negative. Pt finished cefadroxil  Pt reports still having low back pain, side. Denies blood in the urine.  Denies numbness or weakness to lower extre

## 2021-10-28 ENCOUNTER — HOSPITAL ENCOUNTER (OUTPATIENT)
Dept: GENERAL RADIOLOGY | Facility: HOSPITAL | Age: 46
Discharge: HOME | End: 2021-10-28
Attending: INTERNAL MEDICINE
Payer: COMMERCIAL

## 2021-10-28 ENCOUNTER — OFFICE VISIT (OUTPATIENT)
Dept: INTERNAL MEDICINE CLINIC | Facility: CLINIC | Age: 46
End: 2021-10-28
Payer: COMMERCIAL

## 2021-10-28 VITALS
WEIGHT: 167.19 LBS | BODY MASS INDEX: 31.57 KG/M2 | SYSTOLIC BLOOD PRESSURE: 123 MMHG | RESPIRATION RATE: 16 BRPM | DIASTOLIC BLOOD PRESSURE: 77 MMHG | HEART RATE: 60 BPM | HEIGHT: 61 IN | OXYGEN SATURATION: 98 % | TEMPERATURE: 97 F

## 2021-10-28 DIAGNOSIS — M54.50 ACUTE BILATERAL LOW BACK PAIN WITHOUT SCIATICA: ICD-10-CM

## 2021-10-28 DIAGNOSIS — M54.50 ACUTE BILATERAL LOW BACK PAIN WITHOUT SCIATICA: Primary | ICD-10-CM

## 2021-10-28 DIAGNOSIS — E78.2 MIXED HYPERLIPIDEMIA: ICD-10-CM

## 2021-10-28 DIAGNOSIS — Z71.85 VACCINE COUNSELING: ICD-10-CM

## 2021-10-28 PROCEDURE — 71046 X-RAY EXAM CHEST 2 VIEWS: CPT | Performed by: INTERNAL MEDICINE

## 2021-10-28 PROCEDURE — 90471 IMMUNIZATION ADMIN: CPT | Performed by: INTERNAL MEDICINE

## 2021-10-28 PROCEDURE — 81003 URINALYSIS AUTO W/O SCOPE: CPT | Performed by: INTERNAL MEDICINE

## 2021-10-28 PROCEDURE — 99214 OFFICE O/P EST MOD 30 MIN: CPT | Performed by: INTERNAL MEDICINE

## 2021-10-28 PROCEDURE — 72110 X-RAY EXAM L-2 SPINE 4/>VWS: CPT | Performed by: INTERNAL MEDICINE

## 2021-10-28 PROCEDURE — 3078F DIAST BP <80 MM HG: CPT | Performed by: INTERNAL MEDICINE

## 2021-10-28 PROCEDURE — 96372 THER/PROPH/DIAG INJ SC/IM: CPT | Performed by: INTERNAL MEDICINE

## 2021-10-28 PROCEDURE — 90686 IIV4 VACC NO PRSV 0.5 ML IM: CPT | Performed by: INTERNAL MEDICINE

## 2021-10-28 PROCEDURE — 3074F SYST BP LT 130 MM HG: CPT | Performed by: INTERNAL MEDICINE

## 2021-10-28 PROCEDURE — 3008F BODY MASS INDEX DOCD: CPT | Performed by: INTERNAL MEDICINE

## 2021-10-28 PROCEDURE — 71110 X-RAY EXAM RIBS BIL 3 VIEWS: CPT | Performed by: INTERNAL MEDICINE

## 2021-10-28 RX ORDER — KETOROLAC TROMETHAMINE 10 MG/1
10 TABLET, FILM COATED ORAL EVERY 8 HOURS
Qty: 15 TABLET | Refills: 0 | Status: SHIPPED | OUTPATIENT
Start: 2021-10-28

## 2021-10-28 RX ORDER — KETOROLAC TROMETHAMINE 30 MG/ML
30 INJECTION, SOLUTION INTRAMUSCULAR; INTRAVENOUS ONCE
Status: COMPLETED | OUTPATIENT
Start: 2021-10-28 | End: 2021-10-28

## 2021-10-28 RX ORDER — TIZANIDINE 4 MG/1
4 TABLET ORAL NIGHTLY
Qty: 5 TABLET | Refills: 0 | Status: SHIPPED | OUTPATIENT
Start: 2021-10-28 | End: 2021-11-02

## 2021-10-28 RX ADMIN — KETOROLAC TROMETHAMINE 30 MG: 30 INJECTION, SOLUTION INTRAMUSCULAR; INTRAVENOUS at 12:46:00

## 2021-10-28 NOTE — TELEPHONE ENCOUNTER
Spoke with patient today, Patient is ok with coming in today at [place] at 11:30 Am to see MD

## 2021-10-28 NOTE — PATIENT INSTRUCTIONS
ASSESSMENT/PLAN:   Acute bilateral low back pain without sciatica  (primary encounter diagnosis) ? MARYCRUZ CHANCE pt. of options. Try toradol shot now.  Then, take Toradol 10 mg with dinner tonight and then 3 times a day breakfast lunch dinner for the next 5 days and back pain usually gets better in 1 to 2 weeks. Pain related to disk disease, arthritis in the spinal joints or spinal stenosis (narrowing of the spinal canal) can become chronic and last for months or years. Back and neck pain are common problems.  Mos knees. You can also try lying on your side with your knees bent up towards your chest and a pillow between your knees. · At first, do not try to stretch out the sore spots.  If there is a strain, it is not like the good soreness you get after exercising wi 911  Call 911 if any of the following occur:  · Trouble breathing  · Confusion  · Very drowsy or trouble awakening  · Fainting or loss of consciousness  · Rapid or very slow heart rate  · Loss of bowel or bladder control  When to seek medical advice  Call pack on the painful area for 20 minutes and then remove it for 20 minutes. Do thisover a period of 60 to 90 minutes, or several times a day. As a safety precaution, don't use a heating pad at bedtime.  Sleeping on a heating pad can lead to skin burns or tis you must slide something across the floor, push it. Posture tips  Sitting  Sit in chairs with straight backs or low-back support. Keep your knees lower than your hips, with your feet flat on the floor. When driving, sit up straight.  Adjust the seat for healthcare professional's instructions. Exercises to Strengthen Your Lower Back  Strong lower back and abdominal muscles work together to support your spine. The exercises below will help strengthen the lower back.  It is important that you begin exe seconds. Repeat 10 times on each side. Standin. Wall squats: Stand with your back against the wall. Move your feet about 12 inches away from the wall. Tighten your stomach muscles, and slowly bend your knees until they are at about a 45 degree angle. ground (this is not a full sit-up). Keep your head in line with your body (don’t bend your neck forward). Hold for 2 seconds, then slowly lower. Date Last Reviewed: 6/1/2016  © 9426-9652 [employer].  1407 [facility], [address] [place] [address] better in 1 to 2 weeks. Back pain related to disk disease, arthritis in the spinal joints or spinal stenosis (narrowing of the spinal canal) can become chronic and last for months or years.   Unless you had a physical injury (for example, a car accident or without stretching them. · Be aware of safe lifting methods and do not lift anything without stretching first.  Medicines  Talk to your doctor before using medicine, especially if you have other medical problems or are taking other medicines.   · You may u your pain returns often or gets worse over time. For the long-term care of your back, get regular exercise, lose any excess weight and learn good posture.   Take a short rest  Lying down during the day may be helpful for short periods of time if severe pain [address] 1407 [facility] [place], [address] [place] [address]. All rights reserved. This information is not intended as a substitute for professional medical care. Always follow your healthcare professional's instructions.         Relieving Back Pain  Back pain i professional medical care. Always follow your healthcare professional's instructions. Back Safety for Healthcare Workers     [place] at your knees and hips when bending down.    Whether you’re moving a patient, lifting a box of supplies, or pushing a ca throughout the day. You will likely have fewer back problems if you do. Try to warm up before you move. Shift positions often. Also do your best to form healthy habits. Warm up for the day  Do a few slow, catlike stretches before starting your day.  This s disks separate the hard bones of your spine. The disks let your spine bend and move. The parts of the spine  · The vertebrae are the 24 bones that make up the spine. · The spinous process is the part of each vertebra you can feel through your skin.   · tender spots called trigger points. The tips below may help relieve muscle tension. · Press the trigger point if you can reach it. If not, lie on a soft tennis ball, or ask a friend to press the spot. Use steady pressure for 10 to 15 seconds.  Breathe deep information is not intended as a substitute for professional medical care. Always follow your healthcare professional's instructions. Back Exercises: Seated Rotation    To start, sit in a chair with your feet flat on the floor.  Shift your weight sli 0.5 ML  XR RIBS, UNILATERAL (2 VIEWS), RIGHT (CPT=71100)  XR RIBS, UNILATERAL (2 VIEWS), LEFT (CPT=71100)  XR CHEST PA + LAT CHEST (CPT=71046)  XR LUMBAR SPINE (MIN 2 VIEWS) (CPT=72100)   RTC 1-14- 22 for physical.        General Neck and Back Pain    Both include:  · Overexertion, lifting, pushing, pulling incorrectly or too aggressively. · Sudden twisting, bending or stretching from an accident (car or fall), or accidental movement.   · Poor posture  · Poor conditioning, lack of regular exercise  · Spinal medicines. · You may use over-the-counter medicine to control pain, unless another pain medicine was prescribed.  If you have chronic conditions like diabetes, liver or kidney disease, stomach ulcers,  gastrointestinal bleeding, or are taking blood thinner means strengthening both the back muscles and the abdominal muscles will provide better support for your spine. · Swimming and brisk walking are good overall exercises to improve your fitness level. · Practice safe lifting methods (see below).   · Practi keep your back more limber. If exercise makes your back pain worse, don’t do it. · Lie on your back with your knees bent and both feet on the ground. · Slowly raise your left knee to your chest as you flatten your lower back against the floor.  Hold for 5 may affect your care.   Call 911  Call 911 if any of the following occur:  · Trouble breathing  · Confusion  · Very drowsy  · Fainting or loss of consciousness  · Rapid or very slow heart rate  · Loss of  bowel or bladder control  When to seek medical advic exercises  Lying on your back:  5. Ankle pumps: Move your foot up and down, towards your head, and then away. Repeat 10 times with each foot. 6. Heel slides: Slowly bend your knee, drawing the heel of your foot towards you.  Then slide your heel/foot from 20 times. (Advanced: Repeat this exercise raising both arms and both legs a few inches off the floor at the same time. Hold for 5 seconds and release.)  7. Pelvic tilt: Lie on the floor on your back with your knees bent at 90 degrees.  Your feet should be f it is usually not caused by a serious illness.  Mechanical problems include:   · Physical activity such as sports, exercise, work, or normal activity  · Overexertion, lifting, pushing, pulling incorrectly or too aggressively  · Sudden twisting, bending, or over a period of 60 to 90 minutes or several times a day. This will reduce swelling and pain. Wrap the ice pack in a thin towel or plastic to protect your skin. · You can start with ice, then switch to heat.  Heat (hot shower, hot bath, or heating pad) red healthcare provider right away if any of these occur:   · Pain becomes worse or spreads to your legs  · Weakness or numbness in one or both legs  · Numbness in the groin or genital area  Date Last Reviewed: 7/1/2016  © 9519-0888 The [employer]. the object close to your body. Lift heavy items using your legs, not your back. Don’t try to lift more than you can handle. · When sitting, keep your lower back supported. Use a rolled-up towel as needed.     Seek medical care right away if:  · You can't s are already taking. · Take medicines only as directed. Manipulation and massage  Having manipulation by an osteopathic doctor or chiropractor may be helpful. Getting a massage also may help.    Heat  After the first 48 hours, heat can relax sore muscles a Keep feet shoulder-width apart, or one foot slightly in front of the other. Pushing and pulling  Pulling larger objects can be as hard on your back as lifting. Whenever possible, push instead:  · Push with both arms, keeping your elbows bent.   · Stay clos back and thigh muscles stretched and strong. This gives your back better support. Be sure to walk with your spine’s three curves aligned, by keeping your head, hips, and toes connected by a vertical line.   Date Last Reviewed: 5/1/2018  © 9479-8725 The [employer]. Always follow your healthcare professional's instructions. Relieving Tension in Your Back  Being relaxed helps keep your mind healthy and your back ready to move. Take short breaks often. Walk around. Stretch. Switch tasks.  Also give the follo is not intended as a substitute for professional medical care. Always follow your healthcare professional's instructions. Back Exercises: Lower Back Stretch    To start, sit in a chair with your feet flat on the floor.  Shift your weight slightly for Sometimes the disks that separate each bone of the spine may cause pain by pressing on a nearby nerve. Back and neck pain may appear after a sudden twisting or bending force (such as in a car accident), or sometimes after a simple awkward movement.  In mirna pelvic infections   Home care  · For neck pain: Use a comfortable pillow that supports the head and keeps the spine in a neutral position. The position of the head should not be tilted forward or backward. · When in bed, try to find a position of comfort. drowsiness, and can affect your coordination, reflexes, and judgment. Do not drive or operate heavy machinery. Follow-up care  Follow up with your healthcare provider, or as advised. Physical therapy or further tests may be needed.   If X-rays were taken, por  accidente automovilístico o deportivo  · El ana está sometido a luis alfredo tensión pradeep de intensidad baja, brittney cuando se adopta luis alfredo gardenia postura o cuando el espacio de [place] no está dispuesto adecuadamente  Síntomas de distensión cervical  Por ej care. Always follow your healthcare professional's instructions. Lukas Suleman atrás (postura y fuerza)    5. Siéntese en luis alfredo silla con ambos pies apoyados planos en el piso. También puede hacer alicia ejercicio de pie.  Relaje los hombros.  10. Edelmira en columna en luis alfredo posición neutral. La rigo no debe estar inclinada hacia adelante ni hacia atrás. PARA EL DOLOR DE ESPALDA: Es posible que deba permanecer en la cama los primeros días.  Kelby tan pronto pueda, debe empezar a sentarse o caminar para evitar l un radiólogo y le informarán de los nuevos hallazgos que puedan afectar la atención médica que necesita.]  Busque Prontamente Atención Médica  si algo de lo siguiente ocurre:  · El dolor Bonnita Winston o se extiende a los brazos o a las piernas  · Debilidad, entu médico puede diagnosticar y tratar un problema de enrique. Estiramiento del hombro y la parte superior de la espalda  Para comenzar, párese noah erguido, con los oídos, los hombros y las caderas [place].  Debe tener los p[place] [address]. Todos los derechos reservados. Esta información no pretende sustituir la atención médica profesional. Sólo benitez médico puede diagnosticar y tratar un problema de enrique.         Ejercicios para los hombros:       Para empezar, siéntese en luis alfredo silla, apo médica profesional. Sólo benitez médico puede diagnosticar y tratar un problema de enrique. Aproximación de hombros    Para empezar, siéntese en luis alfredo silla, apoyando las plantas de los pies en el suelo.  Desplace benitez peso ligeramente hacia adelante para no ar atención médica profesional. Sólo benitez médico puede diagnosticar y tratar un problema de enrique.         Ejercicios para el ana: Levantamientos de brazos            Para comenzar, acuéstese boca arriba con las rodillas flexionadas y las plantas de los pies tracio.  Nota: Mantenga los hombros apoyados contra el piso. No levante la barbilla mientras gira la rigo. Date Last Reviewed: 11/1/2017  © 7746-0658 [employer]. 1407 [facility] [place], [address] [place] [address]. Todos los derechos reservados.  Esta instrucciones que haya recibido. 15. Cambie de lado y [address] o según las instrucciones que haya recibido. Desafíese  Pima un extremo de luis alfredo toalla debajo de benitez brazo arianna.  Luego lleve el otro extremo hasta cubrir benitez hombro derecho y [place] [Neck Pain, No Trauma]  Hay varias causas posibles por las que el ana puede doler sin que haya lesión:  · Un pequeño movimiento repentino del ana puede provocarle un esguince (distención [sprain]) behzad de los ligamentos o de los músculos.  Kody mantener la columna en luis alfredo posición neutra. La rigo no debería quedar inclinada hacia adelante ni hacia atrás. Es posible que luis alfredo toalla enrollada le brinde un buen apoyo.   2. Algunas personas encuentran alivio al aplicarse calor (luis alfredo ducha caliente, un sustituir la atención médica profesional. Sólo benitez médico puede diagnosticar y tratar un problema de enrique.         Los trastornos del ana        Si usted tiene dolor de ana, no es el único: muchas personas tienen dolor de ana en algún momento de benitez pueden causar que éstas se froten [employer], comprimiendo los nervios y causando dolor. · Articulaciones debilitadas: El envejecimiento y las lesiones pueden producir degeneración gradual de las articulaciones.  La degeneración de las articulaciones [employer] requerir la corrección de malos hábitos relacionados con la [employer] en que usted mueve y sostiene benitez cuerpo. Los siguientes consejos pueden ayudarle a mejorar benitez postura y mecánica corporal.  ¿Qué es la postura y por qué es importante?   La postura es el mod doble las piernas por la rodilla manteniendo la espalda recta. Ellie esto en lugar de mirar hacia abajo y estirarse para alcanzar los objetos. · Trabaje al nivel de los ojos. No estire los brazos por encima de la rigo ni incline la rigo hacia atrás.   A

## 2021-10-28 NOTE — TELEPHONE ENCOUNTER
Can do 1130 tomorrow October 28, 2021 at [facility] squeezing her in between patients. Not sure why someone who is a friend would tell someone else that they need work-up for cancer?

## 2021-10-29 ENCOUNTER — TELEPHONE (OUTPATIENT)
Dept: INTERNAL MEDICINE CLINIC | Facility: CLINIC | Age: 46
End: 2021-10-29

## 2021-10-29 NOTE — TELEPHONE ENCOUNTER
Calling and requesting results of her Xray ,advised Dr Lakshmi Nguyen notes below ,states that her lower back still painful,  and  has been ongoing for 2 months now, current pain level 8/10, states that she is only taking her pain medication Hydrocodone at nig

## 2021-10-30 NOTE — TELEPHONE ENCOUNTER
Patient returning call and has been provided the results and recommendations. She is not opting to partake in physical therapy and is asking should she see a back doctor (chiropractor) or a specialist in arthritis.  She saw a pain clinic provider about 2 ye

## 2021-11-22 ENCOUNTER — VIRTUAL PHONE E/M (OUTPATIENT)
Dept: UROLOGY | Facility: HOSPITAL | Age: 46
End: 2021-11-22
Attending: STUDENT IN AN ORGANIZED HEALTH CARE EDUCATION/TRAINING PROGRAM
Payer: COMMERCIAL

## 2021-11-22 VITALS — BODY MASS INDEX: 31.53 KG/M2 | HEIGHT: 61 IN | WEIGHT: 167 LBS

## 2021-11-22 DIAGNOSIS — R35.1 NOCTURIA: ICD-10-CM

## 2021-11-22 DIAGNOSIS — N39.41 URGE URINARY INCONTINENCE: ICD-10-CM

## 2021-11-22 DIAGNOSIS — N32.81 DETRUSOR INSTABILITY: Primary | ICD-10-CM

## 2021-11-22 NOTE — PROGRESS NOTES
Patient presents to follow up DO/UUI  Given circumstances surrounding COVID-19 this visit is being conducted as a televisit with pt's consent.     She is currently using oxybutynin 10 mg ER     She reports 80% improvement and reports some dry mouth (tolerab

## 2021-11-23 ENCOUNTER — HOSPITAL ENCOUNTER (OUTPATIENT)
Dept: MRI IMAGING | Age: 46
Discharge: HOME | End: 2021-11-23
Attending: NEUROLOGICAL SURGERY
Payer: COMMERCIAL

## 2021-11-23 DIAGNOSIS — M51.36 OTHER INTERVERTEBRAL DISC DEGENERATION, LUMBAR REGION: ICD-10-CM

## 2021-11-23 DIAGNOSIS — M48.02 SPINAL STENOSIS, CERVICAL REGION: ICD-10-CM

## 2021-11-23 PROCEDURE — 72148 MRI LUMBAR SPINE W/O DYE: CPT | Performed by: NEUROLOGICAL SURGERY

## 2021-11-23 PROCEDURE — 72141 MRI NECK SPINE W/O DYE: CPT | Performed by: NEUROLOGICAL SURGERY

## 2021-12-10 ENCOUNTER — HOSPITAL ENCOUNTER (OUTPATIENT)
Age: 46
Discharge: HOME | End: 2021-12-10
Payer: COMMERCIAL

## 2021-12-10 VITALS
SYSTOLIC BLOOD PRESSURE: 137 MMHG | HEART RATE: 66 BPM | DIASTOLIC BLOOD PRESSURE: 78 MMHG | BODY MASS INDEX: 31.53 KG/M2 | TEMPERATURE: 99 F | WEIGHT: 167 LBS | HEIGHT: 61 IN | RESPIRATION RATE: 16 BRPM | OXYGEN SATURATION: 98 %

## 2021-12-10 DIAGNOSIS — J02.0 STREPTOCOCCAL SORE THROAT: Primary | ICD-10-CM

## 2021-12-10 PROCEDURE — 99213 OFFICE O/P EST LOW 20 MIN: CPT | Performed by: NURSE PRACTITIONER

## 2021-12-10 PROCEDURE — U0002 COVID-19 LAB TEST NON-CDC: HCPCS | Performed by: NURSE PRACTITIONER

## 2021-12-10 PROCEDURE — 87880 STREP A ASSAY W/OPTIC: CPT | Performed by: NURSE PRACTITIONER

## 2021-12-10 RX ORDER — AMOXICILLIN 875 MG/1
875 TABLET, COATED ORAL 2 TIMES DAILY
Qty: 20 TABLET | Refills: 0 | Status: SHIPPED | OUTPATIENT
Start: 2021-12-10 | End: 2021-12-20

## 2021-12-11 NOTE — ED PROVIDER NOTES
Patient presents with:  Cough/URI      HPI:     Viktoria Herrera is a 42year old female who presents for a sore throat for the past few days. No fevers or chills. No difficulty swallowing. Speech is clear. She is fully vaccinated for COVID.   She has had m walks for 20 minutes, 3 times per week. Bike Helmet: Not Asked        Seat Belt: Not Asked        Self-Exams: Not Asked    Social History Narrative      Debbie Saavedra is  x 18yrs. She has 3 children. 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  Debbie Saavedra lives wi hour(s)). MDM:  The rapid strep test is positive. The Covid test is negative. I will treat her with amoxicillin and recommend she continue supportive care and follow-up as needed with her doctor.     Diagnosis:    ICD-10-CM    1. Streptococcal sore thr

## 2021-12-13 ENCOUNTER — APPOINTMENT (OUTPATIENT)
Dept: GENERAL RADIOLOGY | Age: 46
End: 2021-12-13
Attending: NURSE PRACTITIONER
Payer: COMMERCIAL

## 2021-12-13 ENCOUNTER — HOSPITAL ENCOUNTER (OUTPATIENT)
Age: 46
Discharge: HOME | End: 2021-12-13
Payer: COMMERCIAL

## 2021-12-13 VITALS
OXYGEN SATURATION: 98 % | HEART RATE: 63 BPM | WEIGHT: 167 LBS | HEIGHT: 61 IN | SYSTOLIC BLOOD PRESSURE: 137 MMHG | BODY MASS INDEX: 31.53 KG/M2 | RESPIRATION RATE: 18 BRPM | TEMPERATURE: 98 F | DIASTOLIC BLOOD PRESSURE: 76 MMHG

## 2021-12-13 DIAGNOSIS — J01.90 ACUTE SINUSITIS, RECURRENCE NOT SPECIFIED, UNSPECIFIED LOCATION: ICD-10-CM

## 2021-12-13 DIAGNOSIS — J02.0 STREPTOCOCCAL SORE THROAT: Primary | ICD-10-CM

## 2021-12-13 PROCEDURE — 71046 X-RAY EXAM CHEST 2 VIEWS: CPT | Performed by: NURSE PRACTITIONER

## 2021-12-13 PROCEDURE — 87502 INFLUENZA DNA AMP PROBE: CPT | Performed by: NURSE PRACTITIONER

## 2021-12-13 PROCEDURE — 99213 OFFICE O/P EST LOW 20 MIN: CPT | Performed by: NURSE PRACTITIONER

## 2021-12-13 RX ORDER — AMOXICILLIN AND CLAVULANATE POTASSIUM 875; 125 MG/1; MG/1
1 TABLET, FILM COATED ORAL 2 TIMES DAILY
Qty: 20 TABLET | Refills: 0 | Status: SHIPPED | OUTPATIENT
Start: 2021-12-13 | End: 2021-12-23

## 2021-12-13 RX ORDER — BENZONATATE 100 MG/1
200 CAPSULE ORAL 3 TIMES DAILY PRN
Qty: 30 CAPSULE | Refills: 0 | Status: SHIPPED | OUTPATIENT
Start: 2021-12-13 | End: 2022-01-12

## 2021-12-13 RX ORDER — ALBUTEROL SULFATE 90 UG/1
2 AEROSOL, METERED RESPIRATORY (INHALATION) EVERY 4 HOURS PRN
Qty: 1 EACH | Refills: 0 | Status: SHIPPED | OUTPATIENT
Start: 2021-12-13 | End: 2022-01-12

## 2021-12-13 NOTE — ED INITIAL ASSESSMENT (HPI)
Pt presents to the IC with c/o a cough, dizziness and feeling lightheaded. Pt was seen on Friday, r/o covid, but was dx with strep.

## 2021-12-13 NOTE — ED PROVIDER NOTES
Patient presents with:  Cough/URI      HPI:     Kwaku Parikh is a 42year old female who presents for a productive cough with green-yellow sputum. The patient was seen here 2 days ago and diagnosed with strep throat.   At that time she had a positive strep control/protection: Tubal Ligation    Other Topics      Concerns:         Service: Not Asked        Blood Transfusions: Not Asked        Caffeine Concern: No        Occupational Exposure: Not Asked        Hobby Hazards: Not Asked        Sleep Susana Orders for this encounter:  Orders Placed This Encounter      XR CHEST PA + LAT CHEST (FKR=[id number])          Order Comments:              cough/lightheaded/dizzy            Order Specific Question: What is the Relevant Clinical Indication / Reason for Exam and follow-up closely with her doctor. She is aware if she develops any chest pain, difficulty breathing, or any other worsening or concerning symptoms, to go to the nearest emergency department for further evaluation.     I discussed this patient with

## 2021-12-21 ENCOUNTER — TELEPHONE (OUTPATIENT)
Dept: INTERNAL MEDICINE CLINIC | Facility: CLINIC | Age: 46
End: 2021-12-21

## 2022-01-20 PROBLEM — M48.02 SPINAL STENOSIS IN CERVICAL REGION: Status: ACTIVE | Noted: 2021-11-17

## 2022-01-20 PROBLEM — M51.36 DEGENERATION OF LUMBAR INTERVERTEBRAL DISC: Status: ACTIVE | Noted: 2021-11-17

## 2022-01-20 PROBLEM — M51.369 DEGENERATION OF LUMBAR INTERVERTEBRAL DISC: Status: ACTIVE | Noted: 2021-11-17

## 2022-01-21 ENCOUNTER — OFFICE VISIT (OUTPATIENT)
Dept: INTERNAL MEDICINE CLINIC | Facility: CLINIC | Age: 47
End: 2022-01-21
Payer: COMMERCIAL

## 2022-01-21 VITALS
HEART RATE: 53 BPM | SYSTOLIC BLOOD PRESSURE: 127 MMHG | OXYGEN SATURATION: 99 % | TEMPERATURE: 98 F | BODY MASS INDEX: 31.49 KG/M2 | RESPIRATION RATE: 16 BRPM | WEIGHT: 166.81 LBS | HEIGHT: 61 IN | DIASTOLIC BLOOD PRESSURE: 74 MMHG

## 2022-01-21 DIAGNOSIS — R74.8 ELEVATED LIVER ENZYMES: ICD-10-CM

## 2022-01-21 DIAGNOSIS — E78.2 MIXED HYPERLIPIDEMIA: Primary | ICD-10-CM

## 2022-01-21 DIAGNOSIS — Z00.00 ROUTINE GENERAL MEDICAL EXAMINATION AT A HEALTH CARE FACILITY: ICD-10-CM

## 2022-01-21 DIAGNOSIS — Z71.85 VACCINE COUNSELING: ICD-10-CM

## 2022-01-21 DIAGNOSIS — D50.0 IRON DEFICIENCY ANEMIA DUE TO CHRONIC BLOOD LOSS: ICD-10-CM

## 2022-01-21 DIAGNOSIS — Z12.31 ENCOUNTER FOR SCREENING MAMMOGRAM FOR MALIGNANT NEOPLASM OF BREAST: ICD-10-CM

## 2022-01-21 DIAGNOSIS — Z01.818 PRE-OP EXAMINATION: ICD-10-CM

## 2022-01-21 LAB
APPEARANCE: CLEAR
BILIRUBIN: NEGATIVE
GLUCOSE (URINE DIPSTICK): NEGATIVE MG/DL
KETONES (URINE DIPSTICK): NEGATIVE MG/DL
LEUKOCYTES: NEGATIVE
MULTISTIX LOT#: NORMAL NUMERIC
NITRITE, URINE: NEGATIVE
OCCULT BLOOD: NEGATIVE
PH, URINE: 5 (ref 4.5–8)
PROTEIN (URINE DIPSTICK): NEGATIVE MG/DL
SPECIFIC GRAVITY: 1.03 (ref 1–1.03)
URINE-COLOR: YELLOW
UROBILINOGEN,SEMI-QN: 0.2 MG/DL (ref 0–1.9)

## 2022-01-21 PROCEDURE — 93000 ELECTROCARDIOGRAM COMPLETE: CPT | Performed by: INTERNAL MEDICINE

## 2022-01-21 PROCEDURE — 3074F SYST BP LT 130 MM HG: CPT | Performed by: INTERNAL MEDICINE

## 2022-01-21 PROCEDURE — 3078F DIAST BP <80 MM HG: CPT | Performed by: INTERNAL MEDICINE

## 2022-01-21 PROCEDURE — 99396 PREV VISIT EST AGE 40-64: CPT | Performed by: INTERNAL MEDICINE

## 2022-01-21 PROCEDURE — 3008F BODY MASS INDEX DOCD: CPT | Performed by: INTERNAL MEDICINE

## 2022-01-21 PROCEDURE — 81003 URINALYSIS AUTO W/O SCOPE: CPT | Performed by: INTERNAL MEDICINE

## 2022-01-21 NOTE — PATIENT INSTRUCTIONS
ASSESSMENT/PLAN:   Mixed hyperlipidemia  (primary encounter diagnosis) Check blood. Iron deficiency anemia due to chronic blood loss Check blood. Vaccine counseling Up to date.      Routine general medical examination at a health care facility Check Referrals:  ELECTROCARDIOGRAM, COMPLETE  [facility] RUBEN 2D+3D SCREENING BILAT (CPT=77067/24628)     RTC in 6 months for FU.

## 2022-01-22 ENCOUNTER — LAB ENCOUNTER (OUTPATIENT)
Dept: LAB | Facility: HOSPITAL | Age: 47
End: 2022-01-22
Attending: INTERNAL MEDICINE
Payer: COMMERCIAL

## 2022-01-22 DIAGNOSIS — D50.0 IRON DEFICIENCY ANEMIA DUE TO CHRONIC BLOOD LOSS: ICD-10-CM

## 2022-01-22 DIAGNOSIS — E78.2 MIXED HYPERLIPIDEMIA: ICD-10-CM

## 2022-01-22 DIAGNOSIS — Z01.818 PRE-OP EXAMINATION: ICD-10-CM

## 2022-01-22 LAB
ALBUMIN SERPL-MCNC: 3.6 G/DL (ref 3.4–5)
ALBUMIN/GLOB SERPL: 1.2 {RATIO} (ref 1–2)
ALP LIVER SERPL-CCNC: 61 U/L
ALT SERPL-CCNC: 23 U/L
ANION GAP SERPL CALC-SCNC: 7 MMOL/L (ref 0–18)
APTT PPP: 26.2 SECONDS (ref 23.3–35.6)
AST SERPL-CCNC: 8 U/L (ref 15–37)
BASOPHILS # BLD AUTO: 0.05 X10(3) UL (ref 0–0.2)
BASOPHILS NFR BLD AUTO: 0.5 %
BILIRUB SERPL-MCNC: 0.4 MG/DL (ref 0.1–2)
BUN BLD-MCNC: 22 MG/DL (ref 7–18)
BUN/CREAT SERPL: 37.3 (ref 10–20)
CALCIUM BLD-MCNC: 8.8 MG/DL (ref 8.5–10.1)
CHLORIDE SERPL-SCNC: 108 MMOL/L (ref 98–112)
CHOLEST SERPL-MCNC: 112 MG/DL (ref ?–200)
CO2 SERPL-SCNC: 27 MMOL/L (ref 21–32)
CREAT BLD-MCNC: 0.59 MG/DL
DEPRECATED HBV CORE AB SER IA-ACNC: 9.2 NG/ML
DEPRECATED RDW RBC AUTO: 42.2 FL (ref 35.1–46.3)
EOSINOPHIL # BLD AUTO: 0.04 X10(3) UL (ref 0–0.7)
EOSINOPHIL NFR BLD AUTO: 0.4 %
ERYTHROCYTE [DISTWIDTH] IN BLOOD BY AUTOMATED COUNT: 14.6 % (ref 11–15)
FASTING PATIENT LIPID ANSWER: YES
FASTING STATUS PATIENT QL REPORTED: YES
GLOBULIN PLAS-MCNC: 3.1 G/DL (ref 2.8–4.4)
GLUCOSE BLD-MCNC: 93 MG/DL (ref 70–99)
HCT VFR BLD AUTO: 36.4 %
HDLC SERPL-MCNC: 37 MG/DL (ref 40–59)
HGB BLD-MCNC: 11.4 G/DL
IMM GRANULOCYTES # BLD AUTO: 0.09 X10(3) UL (ref 0–1)
IMM GRANULOCYTES NFR BLD: 0.9 %
INR BLD: 1.03 (ref 0.8–1.2)
IRON SATN MFR SERPL: 9 %
IRON SERPL-MCNC: 40 UG/DL
LDLC SERPL CALC-MCNC: 60 MG/DL (ref ?–100)
LYMPHOCYTES # BLD AUTO: 1.65 X10(3) UL (ref 1–4)
LYMPHOCYTES NFR BLD AUTO: 17.2 %
MCH RBC QN AUTO: 25.1 PG (ref 26–34)
MCHC RBC AUTO-ENTMCNC: 31.3 G/DL (ref 31–37)
MCV RBC AUTO: 80 FL
MONOCYTES # BLD AUTO: 0.61 X10(3) UL (ref 0.1–1)
MONOCYTES NFR BLD AUTO: 6.4 %
MRSA DNA SPEC QL NAA+PROBE: NEGATIVE
NEUTROPHILS # BLD AUTO: 7.15 X10 (3) UL (ref 1.5–7.7)
NEUTROPHILS # BLD AUTO: 7.15 X10(3) UL (ref 1.5–7.7)
NEUTROPHILS NFR BLD AUTO: 74.6 %
NONHDLC SERPL-MCNC: 75 MG/DL (ref ?–130)
OSMOLALITY SERPL CALC.SUM OF ELEC: 297 MOSM/KG (ref 275–295)
PLATELET # BLD AUTO: 281 10(3)UL (ref 150–450)
POTASSIUM SERPL-SCNC: 4.3 MMOL/L (ref 3.5–5.1)
PROT SERPL-MCNC: 6.7 G/DL (ref 6.4–8.2)
PROTHROMBIN TIME: 13.6 SECONDS (ref 11.6–14.8)
RBC # BLD AUTO: 4.55 X10(6)UL
SODIUM SERPL-SCNC: 142 MMOL/L (ref 136–145)
TIBC SERPL-MCNC: 446 UG/DL (ref 240–450)
TRANSFERRIN SERPL-MCNC: 299 MG/DL (ref 200–360)
TRIGL SERPL-MCNC: 69 MG/DL (ref 30–149)
TSI SER-ACNC: 1.83 MIU/ML (ref 0.36–3.74)
VIT B12 SERPL-MCNC: 793 PG/ML (ref 193–986)
VLDLC SERPL CALC-MCNC: 10 MG/DL (ref 0–30)
WBC # BLD AUTO: 9.6 X10(3) UL (ref 4–11)

## 2022-01-22 PROCEDURE — 84443 ASSAY THYROID STIM HORMONE: CPT

## 2022-01-22 PROCEDURE — 36415 COLL VENOUS BLD VENIPUNCTURE: CPT

## 2022-01-22 PROCEDURE — 85610 PROTHROMBIN TIME: CPT | Performed by: INTERNAL MEDICINE

## 2022-01-22 PROCEDURE — 87641 MR-STAPH DNA AMP PROBE: CPT

## 2022-01-22 PROCEDURE — 80053 COMPREHEN METABOLIC PANEL: CPT | Performed by: INTERNAL MEDICINE

## 2022-01-22 PROCEDURE — 82607 VITAMIN B-12: CPT

## 2022-01-22 PROCEDURE — 85025 COMPLETE CBC W/AUTO DIFF WBC: CPT

## 2022-01-22 PROCEDURE — 84466 ASSAY OF TRANSFERRIN: CPT

## 2022-01-22 PROCEDURE — 83540 ASSAY OF IRON: CPT

## 2022-01-22 PROCEDURE — 80061 LIPID PANEL: CPT

## 2022-01-22 PROCEDURE — 85730 THROMBOPLASTIN TIME PARTIAL: CPT

## 2022-01-22 PROCEDURE — 82728 ASSAY OF FERRITIN: CPT

## 2022-01-24 ENCOUNTER — TELEPHONE (OUTPATIENT)
Dept: INTERNAL MEDICINE CLINIC | Facility: CLINIC | Age: 47
End: 2022-01-24

## 2022-01-24 RX ORDER — ALCLOMETASONE DIPROPIONATE 0.5 MG/G
1 CREAM TOPICAL 2 TIMES DAILY
Qty: 60 G | Refills: 1 | Status: SHIPPED | OUTPATIENT
Start: 2022-01-24

## 2022-01-24 NOTE — PROGRESS NOTES
CBC Normal (white blood cells and red blood cells and platelets), iron levels are lower than prior. Anemia is present and slightly lower than prior.   Would recommend iron 65 twice a day with vitamin C 500 IUs twice a day with folic acid 1 mg once a day to

## 2022-01-24 NOTE — PROGRESS NOTES
Pt informed of results and instructions in detail.  Pt states that she usually gets iron infusions because taking otc iron usually does not work for her, also pt states that rx for for cream was not sent to her pharmacy for the rash, please send rx and let

## 2022-01-24 NOTE — TELEPHONE ENCOUNTER
Pt informed that rx for cream for marv has been sent to her pharmacy, pt informed to use only on hands and not the face. Pt infomred that we will fill out forms for iron infusions.

## 2022-01-24 NOTE — TELEPHONE ENCOUNTER
Called Dr CLIFFORD [facility] office @ [phone] left a message letting them know that I'm trying to fax Pre op clearance to [phone] but is not going thru. If they can please let us know if they have another fax number for us to re fax clearance.

## 2022-01-24 NOTE — TELEPHONE ENCOUNTER
Patient states there was going to be a cream send to her pharmacy for a rash she had on her wrist as discuss on Friday . Please send to pharmacy .  She is also asking if she can go to the infusion center for the iron instead of the pills they don't work on

## 2022-01-25 ENCOUNTER — MED REC SCAN ONLY (OUTPATIENT)
Dept: INTERNAL MEDICINE CLINIC | Facility: CLINIC | Age: 47
End: 2022-01-25

## 2022-01-25 ENCOUNTER — TELEPHONE (OUTPATIENT)
Dept: INTERNAL MEDICINE CLINIC | Facility: CLINIC | Age: 47
End: 2022-01-25

## 2022-01-25 NOTE — TELEPHONE ENCOUNTER
Rayford Hammans  from [facility] center informed it is 200 mg IV once a week for four weeks, accepted verbal instructions.

## 2022-01-25 NOTE — TELEPHONE ENCOUNTER
Clarification is  Needed on dose and frequency, please check the order     [facility] [place] office ,  You have the fax

## 2022-01-25 NOTE — TELEPHONE ENCOUNTER
Please clarify what is the confusion. Is one of her 200 mg IV once a week for 4 weeks for iron deficiency anemia. Not sure if that is a confusion or what it is?

## 2022-01-25 NOTE — TELEPHONE ENCOUNTER
Amor Blood calling from Infusion center at [facility] , needs clarification on Iron fusion orders that were faxed to Dr. Patterson Dark office     Please call her at [phone]

## 2022-02-01 ENCOUNTER — TELEPHONE (OUTPATIENT)
Dept: INTERNAL MEDICINE CLINIC | Facility: CLINIC | Age: 47
End: 2022-02-01

## 2022-02-09 ENCOUNTER — OFFICE VISIT (OUTPATIENT)
Dept: HEMATOLOGY/ONCOLOGY | Facility: HOSPITAL | Age: 47
End: 2022-02-09
Attending: INTERNAL MEDICINE
Payer: COMMERCIAL

## 2022-02-09 VITALS
HEART RATE: 58 BPM | DIASTOLIC BLOOD PRESSURE: 65 MMHG | SYSTOLIC BLOOD PRESSURE: 120 MMHG | TEMPERATURE: 99 F | OXYGEN SATURATION: 98 % | RESPIRATION RATE: 16 BRPM

## 2022-02-09 DIAGNOSIS — E61.1 IRON DEFICIENCY: Primary | ICD-10-CM

## 2022-02-09 PROCEDURE — 96374 THER/PROPH/DIAG INJ IV PUSH: CPT

## 2022-02-09 NOTE — PROGRESS NOTES
Noreen to infusion today for 200mg Venofer 1 of 4. She arrives alert and independent. She states that she has had some fatigue. She had surgery on her right arm last week and present with a cast in place. She states that she has tolerated previous doses of Venofer without any issues. PIV placed to left forearm with good blood return noted. Venofer given slow IVP over 5 minutes with blood return noted throughout. Patient tolerated well, no s/s of adverse reaction noted or reported. PIV flushed and removed, applied 2x2 gauze and coban to site. Patient discharged in stable condition from infusion. She states that she will check [facility] for her appointments.

## 2022-02-16 ENCOUNTER — APPOINTMENT (OUTPATIENT)
Dept: HEMATOLOGY/ONCOLOGY | Facility: HOSPITAL | Age: 47
End: 2022-02-16
Attending: INTERNAL MEDICINE
Payer: COMMERCIAL

## 2022-02-23 ENCOUNTER — OFFICE VISIT (OUTPATIENT)
Dept: HEMATOLOGY/ONCOLOGY | Facility: HOSPITAL | Age: 47
End: 2022-02-23
Attending: INTERNAL MEDICINE
Payer: COMMERCIAL

## 2022-02-23 VITALS
TEMPERATURE: 97 F | DIASTOLIC BLOOD PRESSURE: 67 MMHG | SYSTOLIC BLOOD PRESSURE: 111 MMHG | HEART RATE: 52 BPM | RESPIRATION RATE: 16 BRPM

## 2022-02-23 DIAGNOSIS — E61.1 IRON DEFICIENCY: Primary | ICD-10-CM

## 2022-02-23 PROCEDURE — 96374 THER/PROPH/DIAG INJ IV PUSH: CPT

## 2022-03-02 ENCOUNTER — OFFICE VISIT (OUTPATIENT)
Dept: HEMATOLOGY/ONCOLOGY | Facility: HOSPITAL | Age: 47
End: 2022-03-02
Attending: INTERNAL MEDICINE
Payer: COMMERCIAL

## 2022-03-02 VITALS
HEART RATE: 57 BPM | DIASTOLIC BLOOD PRESSURE: 75 MMHG | RESPIRATION RATE: 16 BRPM | SYSTOLIC BLOOD PRESSURE: 123 MMHG | TEMPERATURE: 98 F

## 2022-03-02 DIAGNOSIS — E61.1 IRON DEFICIENCY: Primary | ICD-10-CM

## 2022-03-02 PROCEDURE — 96374 THER/PROPH/DIAG INJ IV PUSH: CPT

## 2022-03-02 NOTE — PROGRESS NOTES
Patient arrives for venofer 3 of 4. Patient reports she is well, reports some fatigue. PIV started in right AC, positive blood return noted. Venofer given slowly over 5 minutes, positive blood return noted throughout. Patient tolerated well with no complaints. PIV removed, site covered with 2x2 and coban. Patient discharged ambulating independently with future appointments scheduled.

## 2022-03-09 ENCOUNTER — APPOINTMENT (OUTPATIENT)
Dept: HEMATOLOGY/ONCOLOGY | Facility: HOSPITAL | Age: 47
End: 2022-03-09
Attending: INTERNAL MEDICINE
Payer: COMMERCIAL

## 2022-03-10 RX ORDER — METOPROLOL SUCCINATE 25 MG/1
12.5 TABLET, EXTENDED RELEASE ORAL DAILY
Qty: 45 TABLET | Refills: 1 | Status: SHIPPED | OUTPATIENT
Start: 2022-03-10

## 2022-03-10 RX ORDER — SIMVASTATIN 20 MG
20 TABLET ORAL NIGHTLY
Qty: 90 TABLET | Refills: 1 | Status: SHIPPED | OUTPATIENT
Start: 2022-03-10

## 2022-03-11 ENCOUNTER — OFFICE VISIT (OUTPATIENT)
Dept: HEMATOLOGY/ONCOLOGY | Facility: HOSPITAL | Age: 47
End: 2022-03-11
Attending: INTERNAL MEDICINE
Payer: COMMERCIAL

## 2022-03-11 VITALS
HEART RATE: 50 BPM | TEMPERATURE: 98 F | OXYGEN SATURATION: 100 % | DIASTOLIC BLOOD PRESSURE: 64 MMHG | RESPIRATION RATE: 16 BRPM | SYSTOLIC BLOOD PRESSURE: 124 MMHG

## 2022-03-11 DIAGNOSIS — E61.1 IRON DEFICIENCY: Primary | ICD-10-CM

## 2022-03-11 PROCEDURE — 96374 THER/PROPH/DIAG INJ IV PUSH: CPT

## 2022-03-11 NOTE — TELEPHONE ENCOUNTER
Refill passed per [facility] protocol. Requested Prescriptions   Pending Prescriptions Disp Refills    SIMVASTATIN 20 MG Oral Tab [Pharmacy Med Name: SIMVASTATIN 20 MG ORAL TABLET] 90 tablet 1     Sig: Take 1 tablet (20 mg total) by mouth nightly. Cholesterol Medication Protocol Passed - 3/10/2022 12:12 PM        Passed - ALT in past 12 months        Passed - LDL in past 12 months        Passed - Last ALT < 80       Lab Results   Component Value Date    ALT 23 01/22/2022             Passed - Last LDL < 130     Lab Results   Component Value Date    LDL 60 01/22/2022               Passed - Appointment in past 12 or next 3 months           METOPROLOL SUCCINATE ER 25 MG Oral Tablet 24 Hr [Pharmacy Med Name: METOPROLOL SUCCINATE ER 25 MG ORAL TABLET EXTENDED RELEASE 24 HOUR] 45 tablet 1     Sig: TAKE 0.5 TABLETS (12.5 MG TOTAL) BY MOUTH DAILY. TAKE 1/2 TABLET(S) EVERY DAY BY ORAL ROUTE.         Hypertensive Medications Protocol Passed - 3/10/2022 12:12 PM        Passed - CMP or BMP in past 12 months        Passed - Appointment in past 6 or next 3 months        Passed - GFR Non- > 50     Lab Results   Component Value Date    GFRNAA 112 01/22/2022                       Recent Outpatient Visits              1 week ago Iron deficiency    [address] - Infusion    Office Visit    2 weeks ago Iron deficiency    [address] - Infusion    Office Visit    4 weeks ago Iron deficiency    [address] Visit    1 month ago Mixed hyperlipidemia    [employer], [employer], [facility], Carlos Berger MD    Office Visit    3 months ago Detrusor instability    [facility] [place] [facility] Kia Simon [employer]    [address] Phone E/M            Future Appointments         Provider Department Appt Notes    Tomorrow EM CC INFRN [address] [facility] [id number] 4 OF 4    In 2 months Michael Real PA-C [facility] for Pelvic [address] [facility] 6 month f/u Telephone visit [phone]    [facility] for [address] [facility] [address]    In 4 months Perfecto Goldmann., MD [facility], [facility], [facility] 6 months f/u

## 2022-03-11 NOTE — PROGRESS NOTES
Patient arrives for venofer 4 of 4. Patient reports she is well, offers no complaints      PIV started in left t AC, positive blood return noted. Venofer given slowly over 5 minutes, positive blood return noted throughout. Patient tolerated well with no complaints. PIV removed, site covered with 2x2 and coban. Patient discharged ambulating independently.  Will follow up with PCP as directed

## 2022-03-31 NOTE — PROGRESS NOTES
Problem: Adult Inpatient Plan of Care  Goal: Plan of Care Review  Outcome: Ongoing, Progressing  Goal: Patient-Specific Goal (Individualized)  Outcome: Ongoing, Progressing  Goal: Absence of Hospital-Acquired Illness or Injury  Outcome: Ongoing, Progressing  Goal: Optimal Comfort and Wellbeing  Outcome: Ongoing, Progressing  Goal: Readiness for Transition of Care  Outcome: Ongoing, Progressing     Problem: Diabetes Comorbidity  Goal: Blood Glucose Level Within Targeted Range  Outcome: Ongoing, Progressing     Problem: Impaired Wound Healing  Goal: Optimal Wound Healing  Outcome: Ongoing, Progressing     Problem: Skin Injury Risk Increased  Goal: Skin Health and Integrity  Outcome: Ongoing, Progressing     Problem: Fall Injury Risk  Goal: Absence of Fall and Fall-Related Injury  Outcome: Ongoing, Progressing      Patient arrives for venofer 2 of 4. Patient reports she is well, denies any complaints. PIV started in left AC, positive blood return noted. Venofer given slowly over 4 minutes, positive blood return noted throughout. Patient tolerated well with no complaints. PIV removed, site covered with 2x2 and coban. Patient discharged ambulating independently with future appointments scheduled.

## 2022-05-10 RX ORDER — OXYBUTYNIN CHLORIDE 10 MG/1
10 TABLET, EXTENDED RELEASE ORAL DAILY
Qty: 90 TABLET | Refills: 0 | Status: SHIPPED | OUTPATIENT
Start: 2022-05-10

## 2022-05-10 NOTE — TELEPHONE ENCOUNTER
Left message to call back. Patient was last prescribed Fluoxetine 3/17/21 for a 6 month supply. Is the medication she needs? If so, has she been taking it?

## 2022-05-10 NOTE — TELEPHONE ENCOUNTER
Patient is requesting a refill for metoprolol succinate ER 25 MG Oral Tablet 24 Hr and her anxiety medication. Patient did not have the name for her anxiety medication. Please advise. Patient is out of both medications.

## 2022-05-12 RX ORDER — FLUOXETINE HYDROCHLORIDE 20 MG/1
20 CAPSULE ORAL DAILY
Qty: 90 CAPSULE | Refills: 1 | Status: SHIPPED | OUTPATIENT
Start: 2022-05-12

## 2022-05-12 RX ORDER — METOPROLOL SUCCINATE 25 MG/1
12.5 TABLET, EXTENDED RELEASE ORAL DAILY
Qty: 45 TABLET | Refills: 1 | Status: SHIPPED | OUTPATIENT
Start: 2022-05-12 | End: 2022-09-29

## 2022-05-12 NOTE — TELEPHONE ENCOUNTER
Refill passed per [facility] protocol. Requested Prescriptions   Pending Prescriptions Disp Refills    metoprolol succinate ER 25 MG Oral Tablet 24 Hr 45 tablet 1     Sig: Take 0.5 tablets (12.5 mg total) by mouth daily. Hypertensive Medications Protocol Passed - 5/12/2022 10:14 AM        Passed - CMP or BMP in past 12 months        Passed - Appointment in past 6 or next 3 months        Passed - GFR Non- > 50     Lab Results   Component Value Date    GFRNAA 112 01/22/2022                    FLUoxetine 20 MG Oral Cap 90 capsule 1     Sig: Take 1 capsule (20 mg total) by mouth daily.         Psychiatric Non-Scheduled (Anti-Anxiety) Passed - 5/12/2022 10:14 AM        Passed - Appointment in last 6 or next 3 months              Recent Outpatient Visits              2 months ago Iron deficiency    [address] Visit    2 months ago Iron deficiency    [address] Visit    2 months ago Iron deficiency    [address] Visit    3 months ago Iron deficiency    [address] Visit    3 months ago Mixed hyperlipidemia    [facility], [facility], [address] [facility], Aubrey Starkey MD    Office Visit            Future Appointments         Provider Department Appt Notes    In 1 week Leslie Tai, [employer] [facility] for Pelvic [address] [facility] Urogynecology 6 month f/u Telephone visit [phone]    In 2 months [facility] [address]    In 2 months Indira Began., MD [facility], [facility], [address] 6 months f/u

## 2022-05-12 NOTE — TELEPHONE ENCOUNTER
Patient states she needs refill for fluoxetine HCL 20 mg oral cap and metoprolol succinate ER 25 mg. Patient was last prescribed Fluoxetine HCL 20 mg 3/21/22. Patient states she stopped taking if \"for a while \"and is now taking it again.

## 2022-05-23 ENCOUNTER — VIRTUAL PHONE E/M (OUTPATIENT)
Dept: UROLOGY | Facility: HOSPITAL | Age: 47
End: 2022-05-23
Attending: STUDENT IN AN ORGANIZED HEALTH CARE EDUCATION/TRAINING PROGRAM
Payer: COMMERCIAL

## 2022-05-23 VITALS — HEIGHT: 61 IN | BODY MASS INDEX: 31.34 KG/M2 | WEIGHT: 166 LBS

## 2022-05-23 DIAGNOSIS — N39.3 FEMALE STRESS INCONTINENCE: ICD-10-CM

## 2022-05-23 DIAGNOSIS — N81.84 PELVIC MUSCLE WASTING: ICD-10-CM

## 2022-05-23 DIAGNOSIS — N39.41 URGE INCONTINENCE: ICD-10-CM

## 2022-05-23 DIAGNOSIS — R35.1 NOCTURIA: ICD-10-CM

## 2022-05-23 DIAGNOSIS — N32.81 DETRUSOR INSTABILITY: Primary | ICD-10-CM

## 2022-05-23 NOTE — PROGRESS NOTES
Patient to follow up DO/UUI  Given circumstances surrounding COVID-19 this visit is being conducted as a televisit with pt's consent. She is currently using Oxybutynin 10mg ER daily    She reports improvement + improvement   Denies dry mouth  Denies constipation  Denies other significant side effects   Rare UUI  Rare ONI     Takes rx in evening and notes positive improvement amee in nighttime symptoms  Nocturia x 1-2 (improvement from 6-7x)    Happy w/ current treatment plan  Not interested in further management options for ONI or UUI    Impression/Plan:  (N32.81) Detrusor instability  (primary encounter diagnosis)    (N39.41) Urge incontinence    (R35.1) Nocturia    (N39.3) Female stress incontinence    (N81.84) Pelvic muscle wasting      Discussion Items:   Behavioral and pharmacologic treatments for OAB  Pelvic muscle rehabilitation including pelvic floor PT  Bowel routine/constipation regimen    Medications Discussed:  Fiber  Oxybutinin    Treatment Plan, Non-surgical:   Continue oxybutynin 10mg ER daily  Bladder diet/drill   If symptoms persist consider pelvic floor PT vs. alt. OAB med  Bowel management reviewed  Call w/ s/sx of UTI  All questions answered  She understands and agrees to plan    Return in about 6 months (around 11/23/2022) for DO/UUI follow up in person, sooner prn .     Samra Linder PA-C    Total time spent: 15 minutes

## 2022-07-02 NOTE — PROGRESS NOTES
HPI:    Patient ID: Rosanne Salvador is a 45year old female. Rosanne Salvador is a 45year old female who presents for a pre-operative physical exam.  Yvan abel. Patient is to have , to be done by Dr. Laron Forbes at [facility] on 1 week. LNMP: 5-3-20 X 2 days.  [facility] Recommendations:   - outpt BP goal < 130/80   - defer to primary team   - outpatient annual urinary microalb/cr ratio hiatal hernia    • HERNIA SURGERY  11/27/13   • BONNIE LOCALIZATION WIRE 1 SITE LEFT (CPT=19281) Left 07/2017    sebaceous cyst   • OTHER SURGICAL HISTORY      Lasik   • OTHER SURGICAL HISTORY Left     ankle tendon repair   • TUBAL LIGATION  2014   • TUBAL LI Glucose 93 70 - 99 mg/dL    Sodium 140 136 - 145 mmol/L    Potassium 3.8 3.5 - 5.1 mmol/L    Chloride 108 98 - 112 mmol/L    CO2 26.0 21.0 - 32.0 mmol/L    Anion Gap 6 0 - 18 mmol/L    BUN 19 (H) 7 - 18 mg/dL    Creatinine 0.74 0.55 - 1.02 mg/dL    BUN/CRE (CPT=71046)    No follow-ups on file. This consult was sent back the referring physician, Dr. Victor M Vinson at [facility].      Wt Readings from Last 3 Encounters:  05/07/20 : 184 lb 3.2 oz (83.6 kg)  03/31/20 : 187 lb 9.6 oz (85.1 kg)  12/26/19 : 182 lb 3.2 oz (82 polyphagia and polyuria. Genitourinary: Negative for decreased urine volume, difficulty urinating, dysuria, flank pain, frequency, hematuria, menstrual problem, pelvic pain, urgency, vaginal bleeding, vaginal discharge and vaginal pain.    Musculoskeletal Hiatal hernia    • Incontinence    • Umbilical hernia 6600      Past Surgical History:   Procedure Laterality Date   • BREAST BIOPSY Left 2017    Performed by Melchor Butler MD at [facility] OR   •       x 3   •   06   • E sickly appearance. She does not appear ill. No distress. She is not intubated. HENT:   Head: Normocephalic and atraumatic. Right Ear: Tympanic membrane, external ear and ear canal normal. No lacerations. No drainage, swelling or tenderness.  No foreign eye. Right conjunctiva is not injected. Right conjunctiva has no hemorrhage. Left conjunctiva is not injected. Left conjunctiva has no hemorrhage. No scleral icterus. Right eye exhibits normal extraocular motion and no nystagmus.  Left eye exhibits normal e Right cervical: No superficial cervical, no deep cervical and no posterior cervical adenopathy present. Left cervical: No superficial cervical, no deep cervical and no posterior cervical adenopathy present.         Right: No supraclavicular adenopathy

## 2022-07-23 ENCOUNTER — HOSPITAL ENCOUNTER (OUTPATIENT)
Dept: GENERAL RADIOLOGY | Facility: HOSPITAL | Age: 47
Discharge: HOME | End: 2022-07-23
Attending: NURSE PRACTITIONER
Payer: COMMERCIAL

## 2022-07-23 ENCOUNTER — OFFICE VISIT (OUTPATIENT)
Dept: INTERNAL MEDICINE CLINIC | Facility: CLINIC | Age: 47
End: 2022-07-23
Payer: COMMERCIAL

## 2022-07-23 VITALS
DIASTOLIC BLOOD PRESSURE: 60 MMHG | SYSTOLIC BLOOD PRESSURE: 122 MMHG | WEIGHT: 162 LBS | BODY MASS INDEX: 30.58 KG/M2 | OXYGEN SATURATION: 99 % | HEIGHT: 61 IN | HEART RATE: 57 BPM

## 2022-07-23 DIAGNOSIS — E61.1 IRON DEFICIENCY: ICD-10-CM

## 2022-07-23 DIAGNOSIS — R07.9 CHEST PAIN, UNSPECIFIED TYPE: ICD-10-CM

## 2022-07-23 DIAGNOSIS — K21.9 GASTROESOPHAGEAL REFLUX DISEASE, UNSPECIFIED WHETHER ESOPHAGITIS PRESENT: Primary | ICD-10-CM

## 2022-07-23 DIAGNOSIS — E78.2 MIXED HYPERLIPIDEMIA: ICD-10-CM

## 2022-07-23 DIAGNOSIS — R07.81 RIB PAIN ON RIGHT SIDE: ICD-10-CM

## 2022-07-23 LAB
ANION GAP SERPL CALC-SCNC: 5 MMOL/L (ref 0–18)
BASOPHILS # BLD AUTO: 0.05 X10(3) UL (ref 0–0.2)
BASOPHILS NFR BLD AUTO: 0.6 %
BUN BLD-MCNC: 23 MG/DL (ref 7–18)
BUN/CREAT SERPL: 31.9 (ref 10–20)
CALCIUM BLD-MCNC: 8.6 MG/DL (ref 8.5–10.1)
CHLORIDE SERPL-SCNC: 112 MMOL/L (ref 98–112)
CHOLEST SERPL-MCNC: 113 MG/DL (ref ?–200)
CO2 SERPL-SCNC: 26 MMOL/L (ref 21–32)
CREAT BLD-MCNC: 0.72 MG/DL
DEPRECATED RDW RBC AUTO: 48 FL (ref 35.1–46.3)
EOSINOPHIL # BLD AUTO: 0.03 X10(3) UL (ref 0–0.7)
EOSINOPHIL NFR BLD AUTO: 0.4 %
ERYTHROCYTE [DISTWIDTH] IN BLOOD BY AUTOMATED COUNT: 14.6 % (ref 11–15)
FASTING STATUS PATIENT QL REPORTED: YES
GLUCOSE BLD-MCNC: 86 MG/DL (ref 70–99)
HCT VFR BLD AUTO: 41.2 %
HDLC SERPL-MCNC: 42 MG/DL (ref 40–59)
HGB BLD-MCNC: 12.3 G/DL
IMM GRANULOCYTES # BLD AUTO: 0.09 X10(3) UL (ref 0–1)
IMM GRANULOCYTES NFR BLD: 1.2 %
IRON SATN MFR SERPL: 15 %
IRON SERPL-MCNC: 50 UG/DL
LDLC SERPL CALC-MCNC: 58 MG/DL (ref ?–100)
LYMPHOCYTES # BLD AUTO: 1.49 X10(3) UL (ref 1–4)
LYMPHOCYTES NFR BLD AUTO: 19.1 %
MCH RBC QN AUTO: 26.9 PG (ref 26–34)
MCHC RBC AUTO-ENTMCNC: 29.9 G/DL (ref 31–37)
MCV RBC AUTO: 90.2 FL
MONOCYTES # BLD AUTO: 0.62 X10(3) UL (ref 0.1–1)
MONOCYTES NFR BLD AUTO: 7.9 %
NEUTROPHILS # BLD AUTO: 5.52 X10 (3) UL (ref 1.5–7.7)
NEUTROPHILS # BLD AUTO: 5.52 X10(3) UL (ref 1.5–7.7)
NEUTROPHILS NFR BLD AUTO: 70.8 %
NONHDLC SERPL-MCNC: 71 MG/DL (ref ?–130)
OSMOLALITY SERPL CALC.SUM OF ELEC: 299 MOSM/KG (ref 275–295)
PLATELET # BLD AUTO: 240 10(3)UL (ref 150–450)
POTASSIUM SERPL-SCNC: 4.3 MMOL/L (ref 3.5–5.1)
RBC # BLD AUTO: 4.57 X10(6)UL
SODIUM SERPL-SCNC: 143 MMOL/L (ref 136–145)
TIBC SERPL-MCNC: 343 UG/DL (ref 240–450)
TRANSFERRIN SERPL-MCNC: 230 MG/DL (ref 200–360)
TRIGL SERPL-MCNC: 56 MG/DL (ref 30–149)
VLDLC SERPL CALC-MCNC: 8 MG/DL (ref 0–30)
WBC # BLD AUTO: 7.8 X10(3) UL (ref 4–11)

## 2022-07-23 PROCEDURE — 3074F SYST BP LT 130 MM HG: CPT | Performed by: NURSE PRACTITIONER

## 2022-07-23 PROCEDURE — 3008F BODY MASS INDEX DOCD: CPT | Performed by: NURSE PRACTITIONER

## 2022-07-23 PROCEDURE — 36415 COLL VENOUS BLD VENIPUNCTURE: CPT | Performed by: NURSE PRACTITIONER

## 2022-07-23 PROCEDURE — 3078F DIAST BP <80 MM HG: CPT | Performed by: NURSE PRACTITIONER

## 2022-07-23 PROCEDURE — 99214 OFFICE O/P EST MOD 30 MIN: CPT | Performed by: NURSE PRACTITIONER

## 2022-07-23 PROCEDURE — 71101 X-RAY EXAM UNILAT RIBS/CHEST: CPT | Performed by: NURSE PRACTITIONER

## 2022-07-23 RX ORDER — OMEPRAZOLE 40 MG/1
40 CAPSULE, DELAYED RELEASE ORAL DAILY
Qty: 30 CAPSULE | Refills: 0 | Status: SHIPPED | OUTPATIENT
Start: 2022-07-23

## 2022-07-27 ENCOUNTER — TELEPHONE (OUTPATIENT)
Dept: INTERNAL MEDICINE CLINIC | Facility: CLINIC | Age: 47
End: 2022-07-27

## 2022-07-27 ENCOUNTER — MED REC SCAN ONLY (OUTPATIENT)
Dept: INTERNAL MEDICINE CLINIC | Facility: CLINIC | Age: 47
End: 2022-07-27

## 2022-07-27 DIAGNOSIS — R07.81 RIB PAIN ON RIGHT SIDE: Primary | ICD-10-CM

## 2022-07-27 NOTE — TELEPHONE ENCOUNTER
[facility] Scheduling calling to request that the order for patients ultrasound be sent with the specific body part.

## 2022-07-28 NOTE — TELEPHONE ENCOUNTER
[place] from [employer] she needs a revised order for patient's ultrasound. Current order she can not schedule patient.

## 2022-07-29 ENCOUNTER — HOSPITAL ENCOUNTER (OUTPATIENT)
Dept: MAMMOGRAPHY | Facility: HOSPITAL | Age: 47
Discharge: HOME | End: 2022-07-29
Attending: INTERNAL MEDICINE
Payer: COMMERCIAL

## 2022-07-29 DIAGNOSIS — Z12.31 ENCOUNTER FOR SCREENING MAMMOGRAM FOR MALIGNANT NEOPLASM OF BREAST: ICD-10-CM

## 2022-07-29 PROCEDURE — 77063 BREAST TOMOSYNTHESIS BI: CPT | Performed by: INTERNAL MEDICINE

## 2022-07-29 PROCEDURE — 77067 SCR MAMMO BI INCL CAD: CPT | Performed by: INTERNAL MEDICINE

## 2022-08-10 ENCOUNTER — HOSPITAL ENCOUNTER (OUTPATIENT)
Dept: MAMMOGRAPHY | Facility: HOSPITAL | Age: 47
Discharge: HOME | End: 2022-08-10
Attending: INTERNAL MEDICINE
Payer: COMMERCIAL

## 2022-08-10 ENCOUNTER — HOSPITAL ENCOUNTER (OUTPATIENT)
Dept: ULTRASOUND IMAGING | Facility: HOSPITAL | Age: 47
Discharge: HOME | End: 2022-08-10
Attending: INTERNAL MEDICINE
Payer: COMMERCIAL

## 2022-08-10 DIAGNOSIS — R92.8 ABNORMAL MAMMOGRAM OF LEFT BREAST: ICD-10-CM

## 2022-08-10 PROCEDURE — 77065 DX MAMMO INCL CAD UNI: CPT | Performed by: INTERNAL MEDICINE

## 2022-08-10 PROCEDURE — 76642 ULTRASOUND BREAST LIMITED: CPT | Performed by: INTERNAL MEDICINE

## 2022-08-10 PROCEDURE — 77061 BREAST TOMOSYNTHESIS UNI: CPT | Performed by: INTERNAL MEDICINE

## 2022-08-12 ENCOUNTER — APPOINTMENT (OUTPATIENT)
Dept: CT IMAGING | Facility: HOSPITAL | Age: 47
End: 2022-08-12
Attending: EMERGENCY MEDICINE
Payer: COMMERCIAL

## 2022-08-12 ENCOUNTER — HOSPITAL ENCOUNTER (OUTPATIENT)
Age: 47
Discharge: ANOTHER HOSPITAL | End: 2022-08-12
Payer: COMMERCIAL

## 2022-08-12 ENCOUNTER — HOSPITAL ENCOUNTER (EMERGENCY)
Facility: HOSPITAL | Age: 47
Discharge: HOME | End: 2022-08-12
Attending: EMERGENCY MEDICINE
Payer: COMMERCIAL

## 2022-08-12 VITALS
WEIGHT: 160 LBS | HEIGHT: 61 IN | RESPIRATION RATE: 17 BRPM | TEMPERATURE: 98 F | SYSTOLIC BLOOD PRESSURE: 126 MMHG | HEART RATE: 62 BPM | OXYGEN SATURATION: 100 % | DIASTOLIC BLOOD PRESSURE: 84 MMHG | BODY MASS INDEX: 30.21 KG/M2

## 2022-08-12 VITALS
OXYGEN SATURATION: 98 % | HEIGHT: 61 IN | DIASTOLIC BLOOD PRESSURE: 74 MMHG | BODY MASS INDEX: 30.21 KG/M2 | HEART RATE: 59 BPM | RESPIRATION RATE: 18 BRPM | TEMPERATURE: 98 F | SYSTOLIC BLOOD PRESSURE: 114 MMHG | WEIGHT: 160 LBS

## 2022-08-12 DIAGNOSIS — R19.7 DIARRHEA, UNSPECIFIED TYPE: Primary | ICD-10-CM

## 2022-08-12 DIAGNOSIS — R10.11 RUQ PAIN: Primary | ICD-10-CM

## 2022-08-12 DIAGNOSIS — R10.9 ABDOMINAL PAIN, UNSPECIFIED ABDOMINAL LOCATION: ICD-10-CM

## 2022-08-12 LAB
ALBUMIN SERPL-MCNC: 3.8 G/DL (ref 3.4–5)
ALP LIVER SERPL-CCNC: 57 U/L
ALT SERPL-CCNC: 26 U/L
ANION GAP SERPL CALC-SCNC: 5 MMOL/L (ref 0–18)
AST SERPL-CCNC: 13 U/L (ref 15–37)
B-HCG UR QL: NEGATIVE
B-HCG UR QL: NEGATIVE
BASOPHILS # BLD AUTO: 0.03 X10(3) UL (ref 0–0.2)
BASOPHILS NFR BLD AUTO: 0.3 %
BILIRUB DIRECT SERPL-MCNC: 0.1 MG/DL (ref 0–0.2)
BILIRUB SERPL-MCNC: 0.4 MG/DL (ref 0.1–2)
BILIRUB UR QL STRIP: NEGATIVE
BILIRUB UR QL: NEGATIVE
BUN BLD-MCNC: 19 MG/DL (ref 7–18)
BUN/CREAT SERPL: 30.2 (ref 10–20)
CALCIUM BLD-MCNC: 9 MG/DL (ref 8.5–10.1)
CHLORIDE SERPL-SCNC: 108 MMOL/L (ref 98–112)
CO2 SERPL-SCNC: 27 MMOL/L (ref 21–32)
COLOR UR: YELLOW
COLOR UR: YELLOW
CREAT BLD-MCNC: 0.63 MG/DL
DEPRECATED RDW RBC AUTO: 43.9 FL (ref 35.1–46.3)
EOSINOPHIL # BLD AUTO: 0.03 X10(3) UL (ref 0–0.7)
EOSINOPHIL NFR BLD AUTO: 0.3 %
ERYTHROCYTE [DISTWIDTH] IN BLOOD BY AUTOMATED COUNT: 14.3 % (ref 11–15)
GFR SERPLBLD BASED ON 1.73 SQ M-ARVRAT: 111 ML/MIN/1.73M2 (ref 60–?)
GLUCOSE BLD-MCNC: 77 MG/DL (ref 70–99)
GLUCOSE UR STRIP-MCNC: NEGATIVE MG/DL
GLUCOSE UR-MCNC: NEGATIVE MG/DL
HCT VFR BLD AUTO: 42.3 %
HGB BLD-MCNC: 13.5 G/DL
IMM GRANULOCYTES # BLD AUTO: 0.09 X10(3) UL (ref 0–1)
IMM GRANULOCYTES NFR BLD: 0.8 %
KETONES UR STRIP-MCNC: NEGATIVE MG/DL
KETONES UR-MCNC: NEGATIVE MG/DL
LEUKOCYTE ESTERASE UR QL STRIP.AUTO: NEGATIVE
LEUKOCYTE ESTERASE UR QL STRIP: NEGATIVE
LIPASE SERPL-CCNC: 120 U/L (ref 73–393)
LYMPHOCYTES # BLD AUTO: 1.83 X10(3) UL (ref 1–4)
LYMPHOCYTES NFR BLD AUTO: 16.8 %
MCH RBC QN AUTO: 27.2 PG (ref 26–34)
MCHC RBC AUTO-ENTMCNC: 31.9 G/DL (ref 31–37)
MCV RBC AUTO: 85.1 FL
MONOCYTES # BLD AUTO: 0.85 X10(3) UL (ref 0.1–1)
MONOCYTES NFR BLD AUTO: 7.8 %
NEUTROPHILS # BLD AUTO: 8.05 X10 (3) UL (ref 1.5–7.7)
NEUTROPHILS # BLD AUTO: 8.05 X10(3) UL (ref 1.5–7.7)
NEUTROPHILS NFR BLD AUTO: 74 %
NITRITE UR QL STRIP.AUTO: NEGATIVE
NITRITE UR QL STRIP: NEGATIVE
OSMOLALITY SERPL CALC.SUM OF ELEC: 291 MOSM/KG (ref 275–295)
PH UR STRIP: 5.5 [PH]
PH UR: 5.5 [PH] (ref 5–8)
PLATELET # BLD AUTO: 289 10(3)UL (ref 150–450)
POTASSIUM SERPL-SCNC: 3.5 MMOL/L (ref 3.5–5.1)
PROT SERPL-MCNC: 7.3 G/DL (ref 6.4–8.2)
PROT UR STRIP-MCNC: NEGATIVE MG/DL
PROT UR-MCNC: NEGATIVE MG/DL
RBC # BLD AUTO: 4.97 X10(6)UL
SODIUM SERPL-SCNC: 140 MMOL/L (ref 136–145)
SP GR UR STRIP: >=1.03
SP GR UR STRIP: >=1.03 (ref 1–1.03)
UROBILINOGEN UR STRIP-ACNC: 0.2
UROBILINOGEN UR STRIP-ACNC: <2 MG/DL
WBC # BLD AUTO: 10.9 X10(3) UL (ref 4–11)

## 2022-08-12 PROCEDURE — 80048 BASIC METABOLIC PNL TOTAL CA: CPT

## 2022-08-12 PROCEDURE — 81015 MICROSCOPIC EXAM OF URINE: CPT | Performed by: EMERGENCY MEDICINE

## 2022-08-12 PROCEDURE — 83690 ASSAY OF LIPASE: CPT | Performed by: EMERGENCY MEDICINE

## 2022-08-12 PROCEDURE — 80048 BASIC METABOLIC PNL TOTAL CA: CPT | Performed by: EMERGENCY MEDICINE

## 2022-08-12 PROCEDURE — 96372 THER/PROPH/DIAG INJ SC/IM: CPT

## 2022-08-12 PROCEDURE — 99205 OFFICE O/P NEW HI 60 MIN: CPT

## 2022-08-12 PROCEDURE — 99284 EMERGENCY DEPT VISIT MOD MDM: CPT

## 2022-08-12 PROCEDURE — 96361 HYDRATE IV INFUSION ADD-ON: CPT

## 2022-08-12 PROCEDURE — 80076 HEPATIC FUNCTION PANEL: CPT | Performed by: EMERGENCY MEDICINE

## 2022-08-12 PROCEDURE — 81001 URINALYSIS AUTO W/SCOPE: CPT

## 2022-08-12 PROCEDURE — 85025 COMPLETE CBC W/AUTO DIFF WBC: CPT

## 2022-08-12 PROCEDURE — 81025 URINE PREGNANCY TEST: CPT

## 2022-08-12 PROCEDURE — 74177 CT ABD & PELVIS W/CONTRAST: CPT | Performed by: EMERGENCY MEDICINE

## 2022-08-12 PROCEDURE — 81015 MICROSCOPIC EXAM OF URINE: CPT

## 2022-08-12 PROCEDURE — 81025 URINE PREGNANCY TEST: CPT | Performed by: EMERGENCY MEDICINE

## 2022-08-12 PROCEDURE — 81002 URINALYSIS NONAUTO W/O SCOPE: CPT

## 2022-08-12 PROCEDURE — 96374 THER/PROPH/DIAG INJ IV PUSH: CPT

## 2022-08-12 PROCEDURE — 85025 COMPLETE CBC W/AUTO DIFF WBC: CPT | Performed by: EMERGENCY MEDICINE

## 2022-08-12 RX ORDER — DICYCLOMINE HCL 20 MG
20 TABLET ORAL 4 TIMES DAILY PRN
Qty: 10 TABLET | Refills: 0 | Status: SHIPPED | OUTPATIENT
Start: 2022-08-12

## 2022-08-12 RX ORDER — DICYCLOMINE HYDROCHLORIDE 10 MG/ML
20 INJECTION INTRAMUSCULAR ONCE
Status: COMPLETED | OUTPATIENT
Start: 2022-08-12 | End: 2022-08-12

## 2022-08-12 RX ORDER — ONDANSETRON 4 MG/1
4 TABLET, ORALLY DISINTEGRATING ORAL EVERY 4 HOURS PRN
Qty: 10 TABLET | Refills: 0 | Status: SHIPPED | OUTPATIENT
Start: 2022-08-12 | End: 2022-08-19

## 2022-08-12 RX ORDER — ONDANSETRON 2 MG/ML
4 INJECTION INTRAMUSCULAR; INTRAVENOUS ONCE
Status: COMPLETED | OUTPATIENT
Start: 2022-08-12 | End: 2022-08-12

## 2022-08-12 NOTE — ED INITIAL ASSESSMENT (HPI)
Patient presents a&o 4/4 c/o diarrhea, RUQ and LUQ pain since Wednesday. Denies fever + chills. States saw blood in toilet today but advises that may be from wiping. + nausea. Denies emesis.  Denies travel, trying new food, eating at a new restaurant

## 2022-08-13 DIAGNOSIS — N39.41 URGE INCONTINENCE: ICD-10-CM

## 2022-08-13 DIAGNOSIS — R35.1 NOCTURIA: ICD-10-CM

## 2022-08-13 DIAGNOSIS — N32.81 DETRUSOR INSTABILITY: ICD-10-CM

## 2022-08-13 RX ORDER — OXYBUTYNIN CHLORIDE 10 MG/1
10 TABLET, EXTENDED RELEASE ORAL DAILY
Qty: 90 TABLET | Refills: 0 | OUTPATIENT
Start: 2022-08-13

## 2022-08-15 ENCOUNTER — TELEPHONE (OUTPATIENT)
Dept: INTERNAL MEDICINE CLINIC | Facility: CLINIC | Age: 47
End: 2022-08-15

## 2022-08-15 RX ORDER — OXYBUTYNIN CHLORIDE 10 MG/1
10 TABLET, EXTENDED RELEASE ORAL DAILY
Qty: 90 TABLET | Refills: 0 | Status: SHIPPED | OUTPATIENT
Start: 2022-08-15 | End: 2022-08-27

## 2022-08-15 NOTE — TELEPHONE ENCOUNTER
Please call pt to review CT results. Received patient CTs calcium score from [employer] patient has a total calcium score of 44 showing mild atherosclerotic plaque and mild coronary artery narrowings. Recommend continuing with simvastatin nightly and low-fat, low red meat diet. Exercise 3-4 times a week for 30 to 45 minutes. Results sent to scanning.

## 2022-08-17 ENCOUNTER — HOSPITAL ENCOUNTER (OUTPATIENT)
Dept: ULTRASOUND IMAGING | Facility: HOSPITAL | Age: 47
Discharge: HOME | End: 2022-08-17
Attending: NURSE PRACTITIONER
Payer: COMMERCIAL

## 2022-08-17 DIAGNOSIS — R07.81 RIB PAIN ON RIGHT SIDE: ICD-10-CM

## 2022-08-17 PROCEDURE — 76604 US EXAM CHEST: CPT | Performed by: NURSE PRACTITIONER

## 2022-08-27 DIAGNOSIS — N32.81 DETRUSOR INSTABILITY: ICD-10-CM

## 2022-08-27 DIAGNOSIS — R35.1 NOCTURIA: ICD-10-CM

## 2022-08-27 DIAGNOSIS — N39.41 URGE INCONTINENCE: ICD-10-CM

## 2022-08-27 RX ORDER — OMEPRAZOLE 40 MG/1
40 CAPSULE, DELAYED RELEASE ORAL DAILY
Qty: 90 CAPSULE | Refills: 1 | Status: SHIPPED | OUTPATIENT
Start: 2022-08-27 | End: 2023-02-28

## 2022-08-27 NOTE — TELEPHONE ENCOUNTER
Refill passed per [facility], [facility] protocol. Requested Prescriptions   Pending Prescriptions Disp Refills    Omeprazole 40 MG Oral Capsule Delayed Release 30 capsule 0     Sig: Take 1 capsule (40 mg total) by mouth daily.         Gastrointestional Medication Protocol Passed - 8/27/2022 11:57 AM        Passed - In person appointment or virtual visit in the past 12 mos or appointment in next 3 mos       Recent Outpatient Visits              1 month ago Gastroesophageal reflux disease, unspecified whether esophagitis present    [facility], [facility], [address], DeweyRosendo Madsen, APRN    Office Visit    3 months ago Detrusor instability    [facility] for [facility]vic [address] [facility] Urogynecology Alissa Jean Baptiste PA-C    Virtual Phone E/M    5 months ago Iron deficiency    [address] Visit    5 months ago Iron deficiency    [address] Visit    6 months ago Iron deficiency    [address] Visit     Future Appointments         Provider Department Appt Notes    In 3 months Alissa Res [employer] [facility] for Pelvic [address] [facility] Urogynecology 6 month f/u                      Future Appointments         Provider Department Appt Notes    In 3 months Alissa Jean Baptiste [employer] [facility] for [facility] [address] [facility] Urogynecology 6 month f/u             Recent Outpatient Visits              1 month ago Gastroesophageal reflux disease, unspecified whether esophagitis present    [address] [facility], [place]weyRosendo Madsen, APRN    Office Visit    3 months ago Detrusor instability    [facility] [address] [facility] Urogynecology Alissa Jean Baptiste PA-C    Virtual Phone E/M    5 months ago Iron deficiency    [address] Visit    5 months ago Iron deficiency    [address] - Infusion    Office Visit    6 months ago Iron deficiency    [address] - Infusion    Office Visit

## 2022-08-29 RX ORDER — OXYBUTYNIN CHLORIDE 10 MG/1
10 TABLET, EXTENDED RELEASE ORAL DAILY
Qty: 90 TABLET | Refills: 3 | Status: SHIPPED | OUTPATIENT
Start: 2022-08-29

## 2022-09-29 RX ORDER — METOPROLOL SUCCINATE 25 MG/1
12.5 TABLET, EXTENDED RELEASE ORAL DAILY
Qty: 45 TABLET | Refills: 1 | Status: SHIPPED | OUTPATIENT
Start: 2022-09-29 | End: 2023-03-01

## 2022-10-28 ENCOUNTER — LAB REQUISITION (OUTPATIENT)
Dept: SURGERY | Age: 47
End: 2022-10-28
Payer: COMMERCIAL

## 2022-10-28 ENCOUNTER — SURGERY CENTER DOCUMENTATION (OUTPATIENT)
Dept: SURGERY | Age: 47
End: 2022-10-28

## 2022-10-28 DIAGNOSIS — M79.89 SOFT TISSUE MASS: ICD-10-CM

## 2022-10-28 PROCEDURE — 88307 TISSUE EXAM BY PATHOLOGIST: CPT | Performed by: SURGERY

## 2022-10-28 PROCEDURE — 88304 TISSUE EXAM BY PATHOLOGIST: CPT | Performed by: SURGERY

## 2022-10-28 NOTE — PROCEDURES
[facility] OPERATIVE REPORT:     PATIENT NAME: Jewell Fierro  : 1975   MRN: [id number]    DATE OF OPERATION:   10/28/22    PREOPERATIVE DIAGNOSIS:  Lipomatous Soft Tissue Mass of right flank     POSTOPERATIVE DIAGNOSIS: same     PROCEDURE PERFORMED: Excision of lipomatous soft tissue mass of right flank measuring 4 x 3 cm with complex/layered closure x 4 cm    SURGEON:  Bird Andrade MD     ASST: Bk Chapa CSA (Assistant helped position patient and helped with positioning, camera, retraction, suturing, closure, dressings etc.)    ANESTHESIA: General anesthesia      ESTIMATED BLOOD LOSS:   3 ml    The patient understood the indications, details, and potential risks and complications including bleeding, infection, hematoma, seroma, cosmetic deformity, need for reoperation, etc. The patient consented. The patient was brought into the operating room. The type of anesthesia chosen for this procedure was general anesthesia. The patient was placed in left lateral decubitus position and was prepped and draped. Local anesthesia in the form of 0.25% Marcaine with epinephrine was infused in the form of a field block in all operative fields. A oblique incision was made and the mass was dissected from the surrounding tissues. The lesion extended to fascia. The lesion was completely excised and the capsule was left intact. Careful hemostasis was obtained. The wound was closed with 3-0 Vicryl to approximate the flaps to the fascia superiorly and inferiorly and continuous for the deep and superficial subcutaneous tissue layer. The skin was closed with 4-0 Vicryl subcuticular suture. The skin incisions were dressed with Dermabond. The patient tolerated the procedure and went into the PACU in stable condition.   Bird Andrade MD

## 2022-10-29 PROBLEM — D17.1 LIPOMA OF BACK: Status: ACTIVE | Noted: 2022-10-29

## 2022-11-10 ENCOUNTER — ORDER TRANSCRIPTION (OUTPATIENT)
Dept: ADMINISTRATIVE | Facility: HOSPITAL | Age: 47
End: 2022-11-10

## 2022-11-10 ENCOUNTER — OFFICE VISIT (OUTPATIENT)
Dept: OBGYN CLINIC | Facility: CLINIC | Age: 47
End: 2022-11-10
Payer: COMMERCIAL

## 2022-11-10 VITALS — BODY MASS INDEX: 31 KG/M2 | SYSTOLIC BLOOD PRESSURE: 122 MMHG | WEIGHT: 166.19 LBS | DIASTOLIC BLOOD PRESSURE: 78 MMHG

## 2022-11-10 DIAGNOSIS — Z01.419 ENCOUNTER FOR WELL WOMAN EXAM WITH ROUTINE GYNECOLOGICAL EXAM: Primary | ICD-10-CM

## 2022-11-10 DIAGNOSIS — Z01.812 ENCOUNTER FOR PREPROCEDURE SCREENING LABORATORY TESTING FOR COVID-19: Primary | ICD-10-CM

## 2022-11-10 DIAGNOSIS — Z12.4 PAPANICOLAOU SMEAR FOR CERVICAL CANCER SCREENING: ICD-10-CM

## 2022-11-10 DIAGNOSIS — Z20.822 ENCOUNTER FOR PREPROCEDURE SCREENING LABORATORY TESTING FOR COVID-19: Primary | ICD-10-CM

## 2022-11-10 PROCEDURE — 3074F SYST BP LT 130 MM HG: CPT | Performed by: NURSE PRACTITIONER

## 2022-11-10 PROCEDURE — 3078F DIAST BP <80 MM HG: CPT | Performed by: NURSE PRACTITIONER

## 2022-11-10 PROCEDURE — 99396 PREV VISIT EST AGE 40-64: CPT | Performed by: NURSE PRACTITIONER

## 2022-11-16 ENCOUNTER — LAB ENCOUNTER (OUTPATIENT)
Dept: LAB | Facility: HOSPITAL | Age: 47
End: 2022-11-16
Attending: SURGERY
Payer: COMMERCIAL

## 2022-11-16 DIAGNOSIS — Z01.812 ENCOUNTER FOR PREPROCEDURE SCREENING LABORATORY TESTING FOR COVID-19: ICD-10-CM

## 2022-11-16 DIAGNOSIS — Z20.822 ENCOUNTER FOR PREPROCEDURE SCREENING LABORATORY TESTING FOR COVID-19: ICD-10-CM

## 2022-11-17 LAB — SARS-COV-2 RNA RESP QL NAA+PROBE: DETECTED

## 2022-11-21 LAB — HPV I/H RISK 1 DNA SPEC QL NAA+PROBE: NEGATIVE

## 2022-12-03 ENCOUNTER — HOSPITAL ENCOUNTER (OUTPATIENT)
Dept: GENERAL RADIOLOGY | Facility: HOSPITAL | Age: 47
Discharge: HOME | End: 2022-12-03
Attending: SURGERY
Payer: COMMERCIAL

## 2022-12-03 DIAGNOSIS — K44.9 HIATAL HERNIA: ICD-10-CM

## 2022-12-03 DIAGNOSIS — K21.9 GERD (GASTROESOPHAGEAL REFLUX DISEASE): ICD-10-CM

## 2022-12-03 PROCEDURE — 74240 X-RAY XM UPR GI TRC 1CNTRST: CPT | Performed by: SURGERY

## 2022-12-11 ENCOUNTER — TELEPHONE (OUTPATIENT)
Dept: INTERNAL MEDICINE CLINIC | Facility: CLINIC | Age: 47
End: 2022-12-11

## 2022-12-12 ENCOUNTER — TELEPHONE (OUTPATIENT)
Dept: INTERNAL MEDICINE CLINIC | Facility: CLINIC | Age: 47
End: 2022-12-12

## 2022-12-12 ENCOUNTER — MED REC SCAN ONLY (OUTPATIENT)
Dept: INTERNAL MEDICINE CLINIC | Facility: CLINIC | Age: 47
End: 2022-12-12

## 2022-12-12 NOTE — TELEPHONE ENCOUNTER
Received note from Dr. Chandu Hines and he recommended possibly following up with GI again regarding the GERD.

## 2022-12-16 ENCOUNTER — OFFICE VISIT (OUTPATIENT)
Dept: UROLOGY | Facility: HOSPITAL | Age: 47
End: 2022-12-16
Attending: PHYSICIAN ASSISTANT
Payer: COMMERCIAL

## 2022-12-16 VITALS — BODY MASS INDEX: 31.34 KG/M2 | WEIGHT: 166 LBS | TEMPERATURE: 98 F | HEIGHT: 61 IN

## 2022-12-16 DIAGNOSIS — R35.1 NOCTURIA: ICD-10-CM

## 2022-12-16 DIAGNOSIS — N39.3 FEMALE STRESS INCONTINENCE: ICD-10-CM

## 2022-12-16 DIAGNOSIS — N81.84 PELVIC MUSCLE WASTING: ICD-10-CM

## 2022-12-16 DIAGNOSIS — N39.41 URGE INCONTINENCE: ICD-10-CM

## 2022-12-16 DIAGNOSIS — N95.2 VAGINAL ATROPHY: ICD-10-CM

## 2022-12-16 DIAGNOSIS — N32.81 DETRUSOR INSTABILITY: Primary | ICD-10-CM

## 2022-12-16 PROCEDURE — 99212 OFFICE O/P EST SF 10 MIN: CPT

## 2022-12-16 RX ORDER — OXYBUTYNIN CHLORIDE 15 MG/1
15 TABLET, EXTENDED RELEASE ORAL DAILY
Qty: 30 TABLET | Refills: 11 | Status: SHIPPED | OUTPATIENT
Start: 2022-12-16

## 2022-12-16 RX ORDER — ESTRADIOL 0.1 MG/G
CREAM VAGINAL
Qty: 42.5 G | Refills: 3 | Status: SHIPPED | OUTPATIENT
Start: 2022-12-16

## 2023-01-17 ENCOUNTER — TELEPHONE (OUTPATIENT)
Dept: UROLOGY | Facility: HOSPITAL | Age: 48
End: 2023-01-17

## 2023-01-17 NOTE — TELEPHONE ENCOUNTER
TC from [facility] reporting that she is going back to the 10 mg of Oxybutynin from 15 mg Oxbutynin. Pt reports that the 15 mg is making her mouth so dry she is unable to sleep at night and also the constipation has become worse. Pt reports that she did not have these sx while on 10 mg Oxybutynin. Pt states she called just to let us know.   Pt does have a follow up appointment with Ezequiel Rogel on 1/3/2023

## 2023-01-30 ENCOUNTER — VIRTUAL PHONE E/M (OUTPATIENT)
Dept: UROLOGY | Facility: HOSPITAL | Age: 48
End: 2023-01-30
Attending: PHYSICIAN ASSISTANT
Payer: COMMERCIAL

## 2023-01-30 VITALS — HEIGHT: 61 IN | BODY MASS INDEX: 31.34 KG/M2 | WEIGHT: 166 LBS

## 2023-01-30 DIAGNOSIS — N32.81 DETRUSOR INSTABILITY: ICD-10-CM

## 2023-01-30 DIAGNOSIS — N81.84 PELVIC MUSCLE WASTING: ICD-10-CM

## 2023-01-30 DIAGNOSIS — N39.41 URGE INCONTINENCE: Primary | ICD-10-CM

## 2023-01-30 DIAGNOSIS — R35.1 NOCTURIA: ICD-10-CM

## 2023-01-30 RX ORDER — OXYBUTYNIN CHLORIDE 10 MG/1
10 TABLET, EXTENDED RELEASE ORAL DAILY
Qty: 90 TABLET | Refills: 3 | Status: SHIPPED | OUTPATIENT
Start: 2023-01-30

## 2023-03-01 RX ORDER — METOPROLOL SUCCINATE 25 MG/1
12.5 TABLET, EXTENDED RELEASE ORAL DAILY
Qty: 45 TABLET | Refills: 1 | Status: SHIPPED | OUTPATIENT
Start: 2023-03-01

## 2023-03-01 NOTE — TELEPHONE ENCOUNTER
Called patient left message to rishabh back to set up a appointment for follow up Ptg unable to urinate after gates removal. At 4am, bladder scan done with 600cc urine in bladder. In and out cath done, with 650 cc output

## 2023-03-01 NOTE — TELEPHONE ENCOUNTER
Please review. Protocol Failed or has No Protocol. Requested Prescriptions   Pending Prescriptions Disp Refills    METOPROLOL SUCCINATE ER 25 MG Oral Tablet 24 Hr [Pharmacy Med Name: METOPROLOL SUCCINATE ER 25 MG ORAL TABLET EXTENDED RELEASE 24 HOUR] 45 tablet 1     Sig: Take 0.5 tablets (12.5 mg total) by mouth daily. Hypertensive Medications Protocol Failed - 2/28/2023 12:33 PM        Failed - CMP or BMP in past 6 months     No results found for this or any previous visit (from the past 4392 hour(s)).             Failed - In person appointment or virtual visit in the past 6 months     Recent Outpatient Visits              1 month ago Urge incontinence    [facility] [place] [facility] [address] [facility] Urogynecology Marquita Vogel PA-C    Virtual Phone E/M    2 months ago Detrusor instability    [facility] [place] [facility] [address] [facility] Urogynecology Marquita Mellow, [employer]    Office Visit    3 months ago Encounter for well woman exam with routine gynecological exam    [address] [facility], [address] [place] [address], APRN    Office Visit    7 months ago Gastroesophageal reflux disease, unspecified whether esophagitis present    [address] [facility], APR    Office Visit    9 months ago Detrusor instability    [facility] for Pelvic [address] [facility] Urogynecology Kalyn Lott [employer]    Virtual Phone E/M          Future Appointments         Provider Department Appt Notes    In 11 months Berylmo Bettencourt [facility] for Pelvic [address] [facility] Urogynecology 1-yr f/u               Passed - In person appointment in the past 12 or next 3 months     Recent Outpatient Visits              1 month ago Urge incontinence    [facility] [place] [facility] [address] [facility] Urogynecology Marquita Vogel PA-C    Virtual Phone E/M    2 months ago Detrusor instability    [facility] [address] [facility] Urogynecology [place], [place]    Office Visit    3 months ago Encounter for well woman exam with routine gynecological exam    Zamzamminnie Puga, [address] [place] [address] [address], APRN    Office Visit    7 months ago Gastroesophageal reflux disease, unspecified whether esophagitis present    [address] [place] [address], [facility] Paul Riveroide, APRN    Office Visit    9 months ago Detrusor instability    [facility] [address] [facility] Urogynecology [place], [place]    Virtual Phone E/M          Future Appointments         Provider Department Appt Notes    In 11 months Lowell Rosebush [facility] for Pelvic [address] [facility] Urogynecology 1-yr f/u               Passed - Last BP reading less than 140/90     BP Readings from Last 1 Encounters:  11/10/22 : 122/78              Passed - EGFRCR or GFRNAA > 50     GFR Evaluation  EGFRCR: 111 , resulted on 8/12/2022               Recent Outpatient Visits              1 month ago Urge incontinence    [facility] [address] [facility] Urogynecology Samantha Leonard PA-C    Virtual Phone E/M    2 months ago Detrusor instability    [facility] [address] [facility] [place], [place]    Office Visit    3 months ago Encounter for well woman exam with routine gynecological exam    Zamzam Puga, [address] [place] [address] [address], APRN    Office Visit    7 months ago Gastroesophageal reflux disease, unspecified whether esophagitis present    [facility], [address] [facility], [facility] Rosendo Rivero, APRN    Office Visit    9 months ago Detrusor instability    [facility] [address] [facility] Urogynecology [place], [place]    Virtual Phone E/M            Future Appointments         Provider Department Appt Notes    In 11 months Luly Stokes, Mari Buckley PA-C [facility] [address] [facility] Urogynecology 1-yr f/u

## 2023-03-04 ENCOUNTER — HOSPITAL ENCOUNTER (OUTPATIENT)
Age: 48
Discharge: HOME | End: 2023-03-04
Payer: COMMERCIAL

## 2023-03-04 VITALS
SYSTOLIC BLOOD PRESSURE: 132 MMHG | DIASTOLIC BLOOD PRESSURE: 71 MMHG | TEMPERATURE: 97 F | RESPIRATION RATE: 16 BRPM | HEART RATE: 57 BPM | OXYGEN SATURATION: 100 %

## 2023-03-04 DIAGNOSIS — B37.31 YEAST VAGINITIS: Primary | ICD-10-CM

## 2023-03-04 LAB
B-HCG UR QL: NEGATIVE
BILIRUB UR QL STRIP: NEGATIVE
COLOR UR: YELLOW
GLUCOSE UR STRIP-MCNC: NEGATIVE MG/DL
HGB UR QL STRIP: NEGATIVE
KETONES UR STRIP-MCNC: NEGATIVE MG/DL
NITRITE UR QL STRIP: NEGATIVE
PH UR STRIP: 5.5 [PH]
SP GR UR STRIP: >=1.03
UROBILINOGEN UR STRIP-ACNC: <2 MG/DL

## 2023-03-04 PROCEDURE — 87106 FUNGI IDENTIFICATION YEAST: CPT | Performed by: NURSE PRACTITIONER

## 2023-03-04 PROCEDURE — 87205 SMEAR GRAM STAIN: CPT | Performed by: NURSE PRACTITIONER

## 2023-03-04 PROCEDURE — 87491 CHLMYD TRACH DNA AMP PROBE: CPT | Performed by: NURSE PRACTITIONER

## 2023-03-04 PROCEDURE — 87086 URINE CULTURE/COLONY COUNT: CPT | Performed by: NURSE PRACTITIONER

## 2023-03-04 PROCEDURE — 87808 TRICHOMONAS ASSAY W/OPTIC: CPT | Performed by: NURSE PRACTITIONER

## 2023-03-04 PROCEDURE — 87591 N.GONORRHOEAE DNA AMP PROB: CPT | Performed by: NURSE PRACTITIONER

## 2023-03-04 RX ORDER — FLUCONAZOLE 150 MG/1
150 TABLET ORAL DAILY
Qty: 2 TABLET | Refills: 0 | Status: SHIPPED | OUTPATIENT
Start: 2023-03-04 | End: 2023-03-06

## 2023-03-04 NOTE — DISCHARGE INSTRUCTIONS
Your cultures are pending. Take the Diflucan as prescribed. Follow-up as needed with your doctor or gynecologist.  Return for any concerns.

## 2023-03-06 LAB
C TRACH DNA SPEC QL NAA+PROBE: NEGATIVE
GENITAL VAGINOSIS SCREEN: NEGATIVE
N GONORRHOEA DNA SPEC QL NAA+PROBE: NEGATIVE
TRICHOMONAS SCREEN: NEGATIVE

## 2023-03-11 ENCOUNTER — OFFICE VISIT (OUTPATIENT)
Dept: INTERNAL MEDICINE CLINIC | Facility: CLINIC | Age: 48
End: 2023-03-11

## 2023-03-11 VITALS
HEIGHT: 61 IN | HEART RATE: 69 BPM | SYSTOLIC BLOOD PRESSURE: 114 MMHG | BODY MASS INDEX: 33.42 KG/M2 | DIASTOLIC BLOOD PRESSURE: 79 MMHG | RESPIRATION RATE: 16 BRPM | WEIGHT: 177 LBS

## 2023-03-11 DIAGNOSIS — R22.0 MASS OF CHIN: Primary | ICD-10-CM

## 2023-03-11 PROCEDURE — 3074F SYST BP LT 130 MM HG: CPT | Performed by: NURSE PRACTITIONER

## 2023-03-11 PROCEDURE — 3008F BODY MASS INDEX DOCD: CPT | Performed by: NURSE PRACTITIONER

## 2023-03-11 PROCEDURE — 3078F DIAST BP <80 MM HG: CPT | Performed by: NURSE PRACTITIONER

## 2023-03-11 PROCEDURE — 99213 OFFICE O/P EST LOW 20 MIN: CPT | Performed by: NURSE PRACTITIONER

## 2023-03-26 ENCOUNTER — HOSPITAL ENCOUNTER (OUTPATIENT)
Dept: ULTRASOUND IMAGING | Age: 48
End: 2023-03-26
Attending: NURSE PRACTITIONER
Payer: COMMERCIAL

## 2023-03-26 ENCOUNTER — HOSPITAL ENCOUNTER (OUTPATIENT)
Dept: ULTRASOUND IMAGING | Age: 48
Discharge: HOME | End: 2023-03-26
Attending: NURSE PRACTITIONER
Payer: COMMERCIAL

## 2023-03-26 DIAGNOSIS — R22.0 MASS OF CHIN: ICD-10-CM

## 2023-03-26 PROCEDURE — 76536 US EXAM OF HEAD AND NECK: CPT | Performed by: NURSE PRACTITIONER

## 2023-03-27 ENCOUNTER — TELEPHONE (OUTPATIENT)
Dept: INTERNAL MEDICINE CLINIC | Facility: CLINIC | Age: 48
End: 2023-03-27

## 2023-03-27 NOTE — TELEPHONE ENCOUNTER
Patient has viewed results in [address] and has questions regarding Ultrasound of head/neck done yesterday 3/26. This RN also reviewed the results and is asking for provider review and recommendation. [address] please advise.

## 2023-04-05 ENCOUNTER — TELEPHONE (OUTPATIENT)
Dept: INTERNAL MEDICINE CLINIC | Facility: CLINIC | Age: 48
End: 2023-04-05

## 2023-04-05 ENCOUNTER — OFFICE VISIT (OUTPATIENT)
Dept: INTERNAL MEDICINE CLINIC | Facility: CLINIC | Age: 48
End: 2023-04-05

## 2023-04-05 VITALS
DIASTOLIC BLOOD PRESSURE: 62 MMHG | HEIGHT: 61 IN | OXYGEN SATURATION: 99 % | HEART RATE: 64 BPM | BODY MASS INDEX: 33.79 KG/M2 | SYSTOLIC BLOOD PRESSURE: 120 MMHG | WEIGHT: 179 LBS

## 2023-04-05 DIAGNOSIS — R22.0 MASS OF CHIN: ICD-10-CM

## 2023-04-05 DIAGNOSIS — R68.84 MANDIBULAR PAIN: Primary | ICD-10-CM

## 2023-04-05 PROCEDURE — 3008F BODY MASS INDEX DOCD: CPT | Performed by: NURSE PRACTITIONER

## 2023-04-05 PROCEDURE — 3078F DIAST BP <80 MM HG: CPT | Performed by: NURSE PRACTITIONER

## 2023-04-05 PROCEDURE — 3074F SYST BP LT 130 MM HG: CPT | Performed by: NURSE PRACTITIONER

## 2023-04-05 PROCEDURE — 99213 OFFICE O/P EST LOW 20 MIN: CPT | Performed by: NURSE PRACTITIONER

## 2023-04-09 ENCOUNTER — HOSPITAL ENCOUNTER (OUTPATIENT)
Dept: CT IMAGING | Age: 48
Discharge: HOME | End: 2023-04-09
Attending: NURSE PRACTITIONER
Payer: COMMERCIAL

## 2023-04-09 ENCOUNTER — HOSPITAL ENCOUNTER (OUTPATIENT)
Dept: CT IMAGING | Age: 48
End: 2023-04-09
Attending: NURSE PRACTITIONER
Payer: COMMERCIAL

## 2023-04-09 DIAGNOSIS — R68.84 MANDIBULAR PAIN: ICD-10-CM

## 2023-04-09 DIAGNOSIS — R22.0 MASS OF CHIN: ICD-10-CM

## 2023-04-09 PROCEDURE — 70490 CT SOFT TISSUE NECK W/O DYE: CPT | Performed by: NURSE PRACTITIONER

## 2023-04-11 ENCOUNTER — TELEPHONE (OUTPATIENT)
Dept: INTERNAL MEDICINE CLINIC | Facility: CLINIC | Age: 48
End: 2023-04-11

## 2023-04-11 NOTE — TELEPHONE ENCOUNTER
Patient calling (identified name and ) for CT results. Advised that the provider has not had a chance to reviewed the results yet. Patient states she is still in a lot of pain and would like to know next steps as soon as the results have been reviewed.

## 2023-05-15 ENCOUNTER — TELEPHONE (OUTPATIENT)
Dept: INTERNAL MEDICINE CLINIC | Facility: CLINIC | Age: 48
End: 2023-05-15

## 2023-05-15 DIAGNOSIS — Z01.818 PREOPERATIVE CLEARANCE: Primary | ICD-10-CM

## 2023-05-15 NOTE — TELEPHONE ENCOUNTER
Patient is scheduled for a pre-op exam with Dr Josselin Hill on 5/18/2023 and having foot surgery under general anesthesia on 5/19/2023 to remove a cyst. Patient stated that foot doctor is requesting the following blood work: BMP and CBC. Patient wanted to know if Dr Josselin Hill can order the testing so could get it done prior to appointment? Please advise. Tried to schedule an appointment sooner but patient starts work at 9:30am and works till the evening. No appointments available.

## 2023-05-17 ENCOUNTER — LAB ENCOUNTER (OUTPATIENT)
Dept: LAB | Facility: HOSPITAL | Age: 48
End: 2023-05-17
Attending: INTERNAL MEDICINE
Payer: COMMERCIAL

## 2023-05-17 DIAGNOSIS — Z01.818 PREOPERATIVE CLEARANCE: ICD-10-CM

## 2023-05-17 LAB
ANION GAP SERPL CALC-SCNC: 8 MMOL/L (ref 0–18)
BUN BLD-MCNC: 18 MG/DL (ref 7–18)
BUN/CREAT SERPL: 29 (ref 10–20)
CALCIUM BLD-MCNC: 9.2 MG/DL (ref 8.5–10.1)
CHLORIDE SERPL-SCNC: 108 MMOL/L (ref 98–112)
CO2 SERPL-SCNC: 23 MMOL/L (ref 21–32)
CREAT BLD-MCNC: 0.62 MG/DL
DEPRECATED RDW RBC AUTO: 42.9 FL (ref 35.1–46.3)
ERYTHROCYTE [DISTWIDTH] IN BLOOD BY AUTOMATED COUNT: 15.4 % (ref 11–15)
FASTING STATUS PATIENT QL REPORTED: YES
GFR SERPLBLD BASED ON 1.73 SQ M-ARVRAT: 112 ML/MIN/1.73M2 (ref 60–?)
GLUCOSE BLD-MCNC: 111 MG/DL (ref 70–99)
HCT VFR BLD AUTO: 36.9 %
HGB BLD-MCNC: 11.4 G/DL
MCH RBC QN AUTO: 23.9 PG (ref 26–34)
MCHC RBC AUTO-ENTMCNC: 30.9 G/DL (ref 31–37)
MCV RBC AUTO: 77.4 FL
OSMOLALITY SERPL CALC.SUM OF ELEC: 291 MOSM/KG (ref 275–295)
PLATELET # BLD AUTO: 351 10(3)UL (ref 150–450)
POTASSIUM SERPL-SCNC: 4.6 MMOL/L (ref 3.5–5.1)
RBC # BLD AUTO: 4.77 X10(6)UL
SODIUM SERPL-SCNC: 139 MMOL/L (ref 136–145)
WBC # BLD AUTO: 9.6 X10(3) UL (ref 4–11)

## 2023-05-17 PROCEDURE — 36415 COLL VENOUS BLD VENIPUNCTURE: CPT

## 2023-05-17 PROCEDURE — 85027 COMPLETE CBC AUTOMATED: CPT

## 2023-05-17 PROCEDURE — 80048 BASIC METABOLIC PNL TOTAL CA: CPT

## 2023-05-18 ENCOUNTER — OFFICE VISIT (OUTPATIENT)
Facility: CLINIC | Age: 48
End: 2023-05-18

## 2023-05-18 ENCOUNTER — TELEPHONE (OUTPATIENT)
Dept: INTERNAL MEDICINE CLINIC | Facility: CLINIC | Age: 48
End: 2023-05-18

## 2023-05-18 VITALS
HEIGHT: 61 IN | WEIGHT: 144 LBS | HEART RATE: 82 BPM | RESPIRATION RATE: 14 BRPM | OXYGEN SATURATION: 98 % | BODY MASS INDEX: 27.19 KG/M2 | SYSTOLIC BLOOD PRESSURE: 120 MMHG | DIASTOLIC BLOOD PRESSURE: 80 MMHG

## 2023-05-18 DIAGNOSIS — E78.5 DYSLIPIDEMIA: ICD-10-CM

## 2023-05-18 DIAGNOSIS — Z01.818 PREOPERATIVE CLEARANCE: Primary | ICD-10-CM

## 2023-05-18 DIAGNOSIS — F41.9 ANXIETY: ICD-10-CM

## 2023-05-18 DIAGNOSIS — K21.9 GASTROESOPHAGEAL REFLUX DISEASE WITHOUT ESOPHAGITIS: ICD-10-CM

## 2023-05-18 DIAGNOSIS — I10 HYPERTENSION, UNSPECIFIED TYPE: ICD-10-CM

## 2023-05-18 PROCEDURE — 3074F SYST BP LT 130 MM HG: CPT | Performed by: INTERNAL MEDICINE

## 2023-05-18 PROCEDURE — 99214 OFFICE O/P EST MOD 30 MIN: CPT | Performed by: INTERNAL MEDICINE

## 2023-05-18 PROCEDURE — 3008F BODY MASS INDEX DOCD: CPT | Performed by: INTERNAL MEDICINE

## 2023-05-18 PROCEDURE — 3079F DIAST BP 80-89 MM HG: CPT | Performed by: INTERNAL MEDICINE

## 2023-05-18 NOTE — TELEPHONE ENCOUNTER
Nicole, states that the patient is scheduled for surgery tomorrow 5-19-23 and they would like the pre op H & P and last EKG. Please, fax to [phone].

## 2023-06-11 ENCOUNTER — HOSPITAL ENCOUNTER (OUTPATIENT)
Age: 48
Discharge: HOME | End: 2023-06-11
Payer: COMMERCIAL

## 2023-06-11 VITALS
HEART RATE: 70 BPM | TEMPERATURE: 97 F | RESPIRATION RATE: 16 BRPM | DIASTOLIC BLOOD PRESSURE: 74 MMHG | SYSTOLIC BLOOD PRESSURE: 138 MMHG | OXYGEN SATURATION: 98 %

## 2023-06-11 DIAGNOSIS — H00.015 HORDEOLUM EXTERNUM OF LEFT LOWER EYELID: Primary | ICD-10-CM

## 2023-06-11 RX ORDER — ERYTHROMYCIN 5 MG/G
1 OINTMENT OPHTHALMIC 4 TIMES DAILY
Qty: 1 EACH | Refills: 0 | Status: SHIPPED | OUTPATIENT
Start: 2023-06-11 | End: 2023-06-18

## 2023-06-11 NOTE — DISCHARGE INSTRUCTIONS
Erythromycin eye ointment 4 times per day for 7 days  Warm compresses, 10 minutes at a time, a few times per day  Return for fever, eye swelling, vision changes, eye pain, or any new or worsening symptoms  Follow-up with your primary care provider in 7 days if no improvement Telephone Encounter by Raya Rojas at 08/20/18 04:05 PM     Author:  Raya Rojas Service:  (none) Author Type:  Patient      Filed:  08/20/18 04:06 PM Encounter Date:  8/18/2018 Status:  Signed     :  Raya Rojas (Patient )            Patient is calling to check on the status of refill request.  Please advise.[JH1.1M] Message confirmed with caller.  Call connected to OB triage[JH1.1T]        Revision History        User Key Date/Time User Provider Type Action    > JH1.1 08/20/18 04:06 PM Raya Rojas Patient  Sign    M - Manual, T - Template

## 2023-06-11 NOTE — ED INITIAL ASSESSMENT (HPI)
Pt c/o left lower eyelid pain, which started yesterday. Denies vision changes. Denies eyeball pain. Does not wear contacts.  Denies trauma

## 2023-06-18 ENCOUNTER — HOSPITAL ENCOUNTER (OUTPATIENT)
Age: 48
Discharge: HOME | End: 2023-06-18
Payer: COMMERCIAL

## 2023-06-18 VITALS
TEMPERATURE: 98 F | SYSTOLIC BLOOD PRESSURE: 142 MMHG | RESPIRATION RATE: 18 BRPM | DIASTOLIC BLOOD PRESSURE: 73 MMHG | HEART RATE: 62 BPM | OXYGEN SATURATION: 100 %

## 2023-06-18 DIAGNOSIS — H00.015 HORDEOLUM EXTERNUM OF LEFT LOWER EYELID: Primary | ICD-10-CM

## 2023-06-18 NOTE — DISCHARGE INSTRUCTIONS
A stye in your eye does not look infected. Please continue with the antibiotic ointment as prescribed last week. Please increase the use of warm compresses on the eye to at least 8-10 times per day. If you have persistent redness with the stye please follow-up with the ophthalmologist as discussed. If you develop any headache, dizziness, vision changes or any other concerns or complaints you should go to the emergency department.

## 2023-06-18 NOTE — ED INITIAL ASSESSMENT (HPI)
Pt c/o stye to left eye, was prescribed with an ointment and was told that if her symptom continue she can come back for a prescription of an oral antibiotic

## 2023-06-26 ENCOUNTER — OFFICE VISIT (OUTPATIENT)
Dept: INTERNAL MEDICINE CLINIC | Facility: CLINIC | Age: 48
End: 2023-06-26

## 2023-06-26 VITALS
WEIGHT: 178 LBS | HEART RATE: 70 BPM | HEIGHT: 61 IN | RESPIRATION RATE: 14 BRPM | SYSTOLIC BLOOD PRESSURE: 102 MMHG | OXYGEN SATURATION: 98 % | DIASTOLIC BLOOD PRESSURE: 60 MMHG | BODY MASS INDEX: 33.61 KG/M2

## 2023-06-26 DIAGNOSIS — Z00.00 ADULT GENERAL MEDICAL EXAM: Primary | ICD-10-CM

## 2023-06-26 DIAGNOSIS — H00.015 HORDEOLUM EXTERNUM OF LEFT LOWER EYELID: ICD-10-CM

## 2023-06-26 DIAGNOSIS — Z12.31 BREAST CANCER SCREENING BY MAMMOGRAM: ICD-10-CM

## 2023-06-26 PROCEDURE — 3074F SYST BP LT 130 MM HG: CPT | Performed by: INTERNAL MEDICINE

## 2023-06-26 PROCEDURE — 3008F BODY MASS INDEX DOCD: CPT | Performed by: INTERNAL MEDICINE

## 2023-06-26 PROCEDURE — 3078F DIAST BP <80 MM HG: CPT | Performed by: INTERNAL MEDICINE

## 2023-06-26 PROCEDURE — 99396 PREV VISIT EST AGE 40-64: CPT | Performed by: INTERNAL MEDICINE

## 2023-06-27 ENCOUNTER — LAB ENCOUNTER (OUTPATIENT)
Dept: LAB | Facility: HOSPITAL | Age: 48
End: 2023-06-27
Attending: INTERNAL MEDICINE
Payer: COMMERCIAL

## 2023-06-27 DIAGNOSIS — Z00.00 ADULT GENERAL MEDICAL EXAM: ICD-10-CM

## 2023-06-27 LAB
ALBUMIN SERPL-MCNC: 3.4 G/DL (ref 3.4–5)
ALP LIVER SERPL-CCNC: 62 U/L
ALT SERPL-CCNC: 25 U/L
AST SERPL-CCNC: 14 U/L (ref 15–37)
BILIRUB DIRECT SERPL-MCNC: <0.1 MG/DL (ref 0–0.2)
BILIRUB SERPL-MCNC: 0.4 MG/DL (ref 0.1–2)
CHOLEST SERPL-MCNC: 160 MG/DL (ref ?–200)
EST. AVERAGE GLUCOSE BLD GHB EST-MCNC: 128 MG/DL (ref 68–126)
FASTING PATIENT LIPID ANSWER: YES
HBA1C MFR BLD: 6.1 % (ref ?–5.7)
HDLC SERPL-MCNC: 33 MG/DL (ref 40–59)
LDLC SERPL CALC-MCNC: 99 MG/DL (ref ?–100)
NONHDLC SERPL-MCNC: 127 MG/DL (ref ?–130)
PROT SERPL-MCNC: 6.8 G/DL (ref 6.4–8.2)
TRIGL SERPL-MCNC: 157 MG/DL (ref 30–149)
TSI SER-ACNC: 2.11 MIU/ML (ref 0.36–3.74)
VLDLC SERPL CALC-MCNC: 26 MG/DL (ref 0–30)

## 2023-06-27 PROCEDURE — 80076 HEPATIC FUNCTION PANEL: CPT

## 2023-06-27 PROCEDURE — 36415 COLL VENOUS BLD VENIPUNCTURE: CPT

## 2023-06-27 PROCEDURE — 83036 HEMOGLOBIN GLYCOSYLATED A1C: CPT

## 2023-06-27 PROCEDURE — 80061 LIPID PANEL: CPT

## 2023-06-27 PROCEDURE — 84443 ASSAY THYROID STIM HORMONE: CPT

## 2023-07-10 ENCOUNTER — MED REC SCAN ONLY (OUTPATIENT)
Dept: INTERNAL MEDICINE CLINIC | Facility: CLINIC | Age: 48
End: 2023-07-10

## 2023-08-03 DIAGNOSIS — M79.675 PAIN IN TOE OF LEFT FOOT: ICD-10-CM

## 2023-08-03 DIAGNOSIS — M67.472 GANGLION CYST OF LEFT FOOT: Primary | ICD-10-CM

## 2023-08-05 ENCOUNTER — HOSPITAL ENCOUNTER (OUTPATIENT)
Dept: MAMMOGRAPHY | Age: 48
Discharge: HOME | End: 2023-08-05
Attending: INTERNAL MEDICINE
Payer: COMMERCIAL

## 2023-08-05 DIAGNOSIS — Z12.31 BREAST CANCER SCREENING BY MAMMOGRAM: ICD-10-CM

## 2023-08-05 PROCEDURE — 77063 BREAST TOMOSYNTHESIS BI: CPT | Performed by: INTERNAL MEDICINE

## 2023-08-05 PROCEDURE — 77067 SCR MAMMO BI INCL CAD: CPT | Performed by: INTERNAL MEDICINE

## 2023-08-07 RX ORDER — IBUPROFEN 800 MG/1
800 TABLET ORAL 2 TIMES DAILY PRN
Qty: 30 TABLET | Refills: 3 | Status: SHIPPED | OUTPATIENT
Start: 2023-08-07 | End: 2024-08-01

## 2023-08-07 RX ORDER — SIMVASTATIN 20 MG
20 TABLET ORAL NIGHTLY
Qty: 90 TABLET | Refills: 3 | Status: SHIPPED | OUTPATIENT
Start: 2023-08-07

## 2023-08-07 NOTE — TELEPHONE ENCOUNTER
Refill passed per [facility] protocol. Requested Prescriptions   Pending Prescriptions Disp Refills    simvastatin 20 MG Oral Tab 90 tablet 1     Sig: Take 1 tablet (20 mg total) by mouth nightly.        Cholesterol Medication Protocol Passed - 8/5/2023  2:03 PM        Passed - ALT in past 12 months        Passed - LDL in past 12 months        Passed - Last ALT < 80     Lab Results   Component Value Date    ALT 25 06/27/2023             Passed - Last LDL < 130     Lab Results   Component Value Date    LDL 99 06/27/2023             Passed - In person appointment or virtual visit in the past 12 mos or appointment in next 3 mos     Recent Outpatient Visits              1 month ago Adult general medical exam    Radha Hunter MD    Office Visit    2 months ago Preoperative clearance    [address] [facility]    Office Visit    4 months ago Mandibular pain    [facility] [facility]    Office Visit    4 months ago Boeing of chin    [facility] [facility]    Office Visit    6 months ago Urge incontinence    [facility] [address] [facility] Urogynecology Stanislav Prabhakar PA-C    Virtual Phone E/M          Future Appointments         Provider Department Appt Notes    In 3 weeks [address] [facility] MRI [facility] ([address]) [facility] MRI     In 5 months Tarry Freer [facility] [address] [facility] Urogynecology 1-yr f/u                  Recent Outpatient Visits              1 month ago Adult general medical exam    [facility]    Office Visit    2 months ago Preoperative clearance    Xavier Narayanan MD    Office Visit    4 months ago Mandibular pain    [facility], [address] [place], APRJANEY    Office Visit    4 months ago [employer] of Jonathan Pinto [place], JERZY    Office Visit    6 months ago Urge incontinence    [facility] [address] [facility] Urogynecology [place], [place]    Virtual Phone E/M          Future Appointments         Provider Department Appt Notes    In 3 weeks 300 [facility] MRI RM[address] ([address]) [facility] AND [facility] MRI     In 5 months Arabella Dam [facility] [address] [facility] Urogynecology 1-yr f/u

## 2023-08-07 NOTE — TELEPHONE ENCOUNTER
Refill passed per [place], [facility] protocol. Requested Prescriptions   Pending Prescriptions Disp Refills    ibuprofen 800 MG Oral Tab 30 tablet 0     Sig: Take 1 tablet (800 mg total) by mouth 2 (two) times daily as needed for Pain.        Non-Narcotic Pain Medication Protocol Passed - 8/5/2023  2:03 PM        Passed - In person appointment or virtual visit in the past 6 mos or appointment in next 3 mos     Recent Outpatient Visits              1 month ago Adult general medical exam    [facility] [facility] Neha Gamez MD    Office Visit    2 months ago Preoperative clearance    Neha Harris MD    Office Visit    4 months ago Mandibular pain    [facility] [place]    Office Visit    4 months ago [employer] of chin    [facility]    Office Visit    6 months ago Urge incontinence    [facility] [address] [facility] Urogynecology Avril Angulo PA-C    Virtual Phone E/M          Future Appointments         Provider Department Appt Notes    In 3 weeks 300 [facility] MRI [facility] ([address]) [facility] MRI     In 5 months Zada Snow [facility] [address] [facility] Urogynecology 1-yr f/u                  Recent Outpatient Visits              1 month ago Adult general medical exam    [facility]    Office Visit    2 months ago Preoperative clearance    Neha Harris MD    Office Visit    4 months ago Mandibular pain    [facility] [facility]    Office Visit    4 months ago Shana woodall [employer], [address] [place] [address], [facility] Office Visit    6 months ago Urge incontinence    [facility] [place] [facility] [address] [facility] Urogynecology Nahid [place]ard[place], [place]    Virtual Phone E/M          Future Appointments         Provider Department Appt Notes    In 3 weeks 300 [facility] MRI [address] ([address]) [facility] AND [facility] MRI     In 5 months Zayda Bonilla [facility] [address] [facility] Urogynecology 1-yr f/u

## 2023-08-31 ENCOUNTER — HOSPITAL ENCOUNTER (OUTPATIENT)
Dept: MRI IMAGING | Facility: HOSPITAL | Age: 48
Discharge: HOME | End: 2023-08-31
Attending: PODIATRIST
Payer: COMMERCIAL

## 2023-08-31 ENCOUNTER — HOSPITAL ENCOUNTER (OUTPATIENT)
Dept: GENERAL RADIOLOGY | Facility: HOSPITAL | Age: 48
Discharge: HOME | End: 2023-08-31
Attending: RADIOLOGY
Payer: COMMERCIAL

## 2023-08-31 DIAGNOSIS — M79.675 PAIN IN TOE OF LEFT FOOT: ICD-10-CM

## 2023-08-31 DIAGNOSIS — M67.472 GANGLION CYST OF LEFT FOOT: ICD-10-CM

## 2023-08-31 PROCEDURE — 73718 MRI LOWER EXTREMITY W/O DYE: CPT | Performed by: PODIATRIST

## 2023-08-31 PROCEDURE — 73620 X-RAY EXAM OF FOOT: CPT | Performed by: RADIOLOGY

## 2023-09-07 RX ORDER — METOPROLOL SUCCINATE 25 MG/1
12.5 TABLET, EXTENDED RELEASE ORAL DAILY
Qty: 45 TABLET | Refills: 3 | Status: SHIPPED | OUTPATIENT
Start: 2023-09-07

## 2023-09-07 RX ORDER — OMEPRAZOLE 40 MG/1
40 CAPSULE, DELAYED RELEASE ORAL DAILY
Qty: 90 CAPSULE | Refills: 3 | Status: SHIPPED | OUTPATIENT
Start: 2023-09-07

## 2023-09-07 RX ORDER — METOPROLOL SUCCINATE 25 MG/1
12.5 TABLET, EXTENDED RELEASE ORAL DAILY
Qty: 45 TABLET | Refills: 1 | OUTPATIENT
Start: 2023-09-07

## 2023-09-07 NOTE — TELEPHONE ENCOUNTER
Refill passed per [employer], [facility] protocol. Requested Prescriptions   Pending Prescriptions Disp Refills    METOPROLOL SUCCINATE ER 25 MG Oral Tablet 24 Hr [Pharmacy Med Name: METOPROLOL SUCCINATE ER 25 MG ORAL TABLET EXTENDED RELEASE 24 HOUR] 45 tablet 1     Sig: Take 0.5 tablets (12.5 mg total) by mouth daily.        Hypertensive Medications Protocol Passed - 9/6/2023 10:17 AM        Passed - In person appointment in the past 12 or next 3 months     Recent Outpatient Visits              2 months ago Adult general medical exam    Negra Louis MD    Office Visit    3 months ago Preoperative clearance    [address], [address] [facility], MD Xavier    Office Visit    5 months ago Mandibular pain    Mike Jonathan Pollard [place], JERZY    Office Visit    6 months ago [employer], [address] [place], APRJANEY    Office Visit    7 months ago Urge incontinence    [facility] [place] [facility] [address] [facility] Urogynecology Hilda Hernandez PA-C    Virtual Phone E/M          Future Appointments         Provider Department Appt Notes    In 4 months Nola Clipper [facility] [address] [facility] Urogynecology 1-yr f/u               Passed - Last BP reading less than 140/90     BP Readings from Last 1 Encounters:  06/26/23 : 102/60              Passed - CMP or BMP in past 6 months     Recent Results (from the past 4392 hour(s))   BASIC METABOLIC PANEL (8)    Collection Time: 05/17/23  8:12 AM   Result Value Ref Range    Glucose 111 (H) 70 - 99 mg/dL    Sodium 139 136 - 145 mmol/L    Potassium 4.6 3.5 - 5.1 mmol/L    Chloride 108 98 - 112 mmol/L    CO2 23.0 21.0 - 32.0 mmol/L    Anion Gap 8 0 - 18 mmol/L    BUN 18 7 - 18 mg/dL    Creatinine 0.62 0.55 - 1.02 mg/dL    BUN/CREA Ratio 29.0 (H) 10.0 - 20.0    Calcium, Total 9.2 8.5 - 10.1 mg/dL    Calculated Osmolality 291 275 - 295 mOsm/kg    eGFR-Cr 112 >=60 mL/min/1.73m2    Patient Fasting for BMP? Yes      *Note: Due to a large number of results and/or encounters for the requested time period, some results have not been displayed. A complete set of results can be found in Results Review. Passed - In person appointment or virtual visit in the past 6 months     Recent Outpatient Visits              2 months ago Adult general medical exam    Cheryl Hill MD    Office Visit    3 months ago Preoperative clearance    [address], [address] [facility], Tory Alejandra MD    Office Visit    5 months ago Mandibular pain    [facility]    Office Visit    6 months ago [place] of chin    [facility] [facility]    Office Visit    7 months ago Urge incontinence    [facility] [place] [facility] [address] [facility] Urogynecology Jacey George PA-C    Virtual Phone E/M          Future Appointments         Provider Department Appt Notes    In 4 months Bobbette Plana [facility] [address] [facility] Urogynecology 1-yr f/u               Passed - [facility] > 50     GFR Evaluation  EGFRCR: 112 , resulted on 5/17/2023            OMEPRAZOLE 40 MG Oral Capsule Delayed Release [Pharmacy Med Name: OMEPRAZOLE 40 MG ORAL CAPSULE DELAYED RELEASE] 90 capsule 1     Sig: Take 1 capsule (40 mg total) by mouth daily.        Gastrointestional Medication Protocol Passed - 9/6/2023 10:17 AM        Passed - In person appointment or virtual visit in the past 12 mos or appointment in next 3 mos     Recent Outpatient Visits              2 months ago Adult general medical exam    [address], [facility] Tigre Washington MD    Office Visit    3 months ago Preoperative clearance    [facility], [address] [facility], Kevin Gonzalez MD    Office Visit    5 months ago Mandibular pain    Jonathan Villarreal, [place], APRN    Office Visit    6 months ago [place] of chin    Jonathan Villarreal, [place], APRN    Office Visit    7 months ago Urge incontinence    [facility] [address] [facility] Urogynecology [place], [place]    Virtual Phone E/M          Future Appointments         Provider Department Appt Notes    In 4 months Clemente Nunez Claudius Rabon [facility] [address] [facility] Urogynecology 1-yr f/u                    Recent Outpatient Visits              2 months ago Adult general medical exam    Kevin Villarreal MD    Office Visit    3 months ago Preoperative clearance    Kevin Ospina MD    Office Visit    5 months ago Mandibular pain    Jonathan Villarreal, [place], APRN    Office Visit    6 months ago [place] of chin    Jonathan Villarreal, [place], APRN    Office Visit    7 months ago Urge incontinence    [facility] [address] [facility] Urogynecology [facility] Trevor PARANJANA    Virtual Phone E/M            Future Appointments         Provider Department Appt Notes    In 4 months Clemente Nunez Claudius Rabon [facility] [address] [facility] Urogynecology 1-yr f/u

## 2023-10-10 ENCOUNTER — MED REC SCAN ONLY (OUTPATIENT)
Dept: INTERNAL MEDICINE CLINIC | Facility: CLINIC | Age: 48
End: 2023-10-10

## 2023-11-18 ENCOUNTER — HOSPITAL ENCOUNTER (EMERGENCY)
Facility: HOSPITAL | Age: 48
Discharge: HOME | End: 2023-11-18
Attending: EMERGENCY MEDICINE
Payer: COMMERCIAL

## 2023-11-18 ENCOUNTER — HOSPITAL ENCOUNTER (OUTPATIENT)
Age: 48
Discharge: ANOTHER HOSPITAL | End: 2023-11-18
Payer: COMMERCIAL

## 2023-11-18 ENCOUNTER — APPOINTMENT (OUTPATIENT)
Dept: CT IMAGING | Facility: HOSPITAL | Age: 48
End: 2023-11-18
Attending: EMERGENCY MEDICINE
Payer: COMMERCIAL

## 2023-11-18 VITALS
TEMPERATURE: 98 F | DIASTOLIC BLOOD PRESSURE: 75 MMHG | HEART RATE: 67 BPM | OXYGEN SATURATION: 100 % | SYSTOLIC BLOOD PRESSURE: 130 MMHG | RESPIRATION RATE: 18 BRPM

## 2023-11-18 VITALS
SYSTOLIC BLOOD PRESSURE: 138 MMHG | OXYGEN SATURATION: 100 % | HEIGHT: 61 IN | HEART RATE: 73 BPM | WEIGHT: 180 LBS | RESPIRATION RATE: 16 BRPM | TEMPERATURE: 98 F | BODY MASS INDEX: 33.99 KG/M2 | DIASTOLIC BLOOD PRESSURE: 78 MMHG

## 2023-11-18 DIAGNOSIS — R10.9 ABDOMINAL PAIN OF UNKNOWN ETIOLOGY: Primary | ICD-10-CM

## 2023-11-18 DIAGNOSIS — R19.7 DIARRHEA, UNSPECIFIED TYPE: Primary | ICD-10-CM

## 2023-11-18 LAB
ALBUMIN SERPL-MCNC: 4.6 G/DL (ref 3.2–4.8)
ALP LIVER SERPL-CCNC: 76 U/L
ALT SERPL-CCNC: 19 U/L
ANION GAP SERPL CALC-SCNC: 7 MMOL/L (ref 0–18)
AST SERPL-CCNC: 17 U/L (ref ?–34)
B-HCG UR QL: NEGATIVE
B-HCG UR QL: NEGATIVE
BILIRUB DIRECT SERPL-MCNC: 0.2 MG/DL (ref ?–0.3)
BILIRUB SERPL-MCNC: 0.5 MG/DL (ref 0.3–1.2)
BILIRUB UR QL STRIP: NEGATIVE
BILIRUB UR QL: NEGATIVE
BUN BLD-MCNC: 18 MG/DL (ref 9–23)
BUN/CREAT SERPL: 27.7 (ref 10–20)
CALCIUM BLD-MCNC: 9.2 MG/DL (ref 8.7–10.4)
CHLORIDE SERPL-SCNC: 108 MMOL/L (ref 98–112)
CLARITY UR: CLEAR
CLARITY UR: CLEAR
CO2 SERPL-SCNC: 23 MMOL/L (ref 21–32)
COLOR UR: YELLOW
CREAT BLD-MCNC: 0.65 MG/DL
EGFRCR SERPLBLD CKD-EPI 2021: 110 ML/MIN/1.73M2 (ref 60–?)
GLUCOSE BLD-MCNC: 84 MG/DL (ref 70–99)
GLUCOSE UR STRIP-MCNC: NEGATIVE MG/DL
GLUCOSE UR-MCNC: NORMAL MG/DL
HGB UR QL STRIP.AUTO: NEGATIVE
HGB UR QL STRIP: NEGATIVE
KETONES UR STRIP-MCNC: NEGATIVE MG/DL
KETONES UR-MCNC: NEGATIVE MG/DL
LEUKOCYTE ESTERASE UR QL STRIP.AUTO: NEGATIVE
LEUKOCYTE ESTERASE UR QL STRIP: NEGATIVE
LIPASE SERPL-CCNC: 32 U/L (ref 13–75)
NITRITE UR QL STRIP.AUTO: NEGATIVE
NITRITE UR QL STRIP: NEGATIVE
OSMOLALITY SERPL CALC.SUM OF ELEC: 287 MOSM/KG (ref 275–295)
PH UR STRIP: 5.5 [PH]
PH UR: 5.5 [PH] (ref 5–8)
POTASSIUM SERPL-SCNC: 3.7 MMOL/L (ref 3.5–5.1)
PROT SERPL-MCNC: 7.1 G/DL (ref 5.7–8.2)
PROT UR STRIP-MCNC: NEGATIVE MG/DL
PROT UR-MCNC: NEGATIVE MG/DL
SODIUM SERPL-SCNC: 138 MMOL/L (ref 136–145)
SP GR UR STRIP: 1.03 (ref 1–1.03)
SP GR UR STRIP: >=1.03
UROBILINOGEN UR STRIP-ACNC: <2 MG/DL
UROBILINOGEN UR STRIP-ACNC: NORMAL

## 2023-11-18 PROCEDURE — 96372 THER/PROPH/DIAG INJ SC/IM: CPT

## 2023-11-18 PROCEDURE — 81025 URINE PREGNANCY TEST: CPT

## 2023-11-18 PROCEDURE — 80048 BASIC METABOLIC PNL TOTAL CA: CPT

## 2023-11-18 PROCEDURE — 85025 COMPLETE CBC W/AUTO DIFF WBC: CPT | Performed by: EMERGENCY MEDICINE

## 2023-11-18 PROCEDURE — 80076 HEPATIC FUNCTION PANEL: CPT

## 2023-11-18 PROCEDURE — 85025 COMPLETE CBC W/AUTO DIFF WBC: CPT

## 2023-11-18 PROCEDURE — 80048 BASIC METABOLIC PNL TOTAL CA: CPT | Performed by: EMERGENCY MEDICINE

## 2023-11-18 PROCEDURE — 83690 ASSAY OF LIPASE: CPT | Performed by: EMERGENCY MEDICINE

## 2023-11-18 PROCEDURE — 96360 HYDRATION IV INFUSION INIT: CPT

## 2023-11-18 PROCEDURE — 83690 ASSAY OF LIPASE: CPT

## 2023-11-18 PROCEDURE — 74177 CT ABD & PELVIS W/CONTRAST: CPT | Performed by: EMERGENCY MEDICINE

## 2023-11-18 PROCEDURE — 80076 HEPATIC FUNCTION PANEL: CPT | Performed by: EMERGENCY MEDICINE

## 2023-11-18 PROCEDURE — 99285 EMERGENCY DEPT VISIT HI MDM: CPT

## 2023-11-18 PROCEDURE — 99284 EMERGENCY DEPT VISIT MOD MDM: CPT

## 2023-11-18 PROCEDURE — 85060 BLOOD SMEAR INTERPRETATION: CPT | Performed by: EMERGENCY MEDICINE

## 2023-11-18 PROCEDURE — 81003 URINALYSIS AUTO W/O SCOPE: CPT | Performed by: EMERGENCY MEDICINE

## 2023-11-18 RX ORDER — DICYCLOMINE HYDROCHLORIDE 10 MG/ML
20 INJECTION INTRAMUSCULAR ONCE
Status: COMPLETED | OUTPATIENT
Start: 2023-11-18 | End: 2023-11-18

## 2023-11-18 NOTE — DISCHARGE INSTRUCTIONS
Return to the emergency department if you develop severe abdominal pain, severe nausea and vomiting to the point where you are unable to keep down fluids, if you develop chest pain or difficulty breathing, blood in your stool, dizziness or fainting, or if you develop any other new or concerning symptoms as these could be signs of more serious medical illness. Try to stay well-hydrated. Please drink plenty of fluids at home. Return to the emergency department if you develop high fevers, have persistent severe abdominal pain, or have bloody stools or vomit, as these could be signs of a more serious medical emergency. Return to the emergency department if you are unable to keep down liquids because of severe nausea/vomiting.

## 2023-11-18 NOTE — ED INITIAL ASSESSMENT (HPI)
Pt reports cramping abdominal pain when having diarrhea which started today. Diarrhea x 5 since 8am. Mild nausea. Denies vomiting. Denies urinary problems. Reports two other episodes of diarrhea in the last 3 weeks. Denies recent travel.

## 2023-11-18 NOTE — ED INITIAL ASSESSMENT (HPI)
58 y/o female arrives with abd pain intermittent x 1 week with diarrhea. Pt reports 5 episodes of diarrhea today with some nausea. Denies vomiting. Denies fever/chills.

## 2023-11-20 LAB
BASOPHILS # BLD AUTO: 0.04 X10(3) UL (ref 0–0.2)
BASOPHILS NFR BLD AUTO: 0.2 %
DEPRECATED RDW RBC AUTO: 41.7 FL (ref 35.1–46.3)
EOSINOPHIL # BLD AUTO: 0.01 X10(3) UL (ref 0–0.7)
EOSINOPHIL NFR BLD AUTO: 0.1 %
ERYTHROCYTE [DISTWIDTH] IN BLOOD BY AUTOMATED COUNT: 16.9 % (ref 11–15)
HCT VFR BLD AUTO: 33.1 %
HGB BLD-MCNC: 10.2 G/DL
IMM GRANULOCYTES # BLD AUTO: 0.1 X10(3) UL (ref 0–1)
IMM GRANULOCYTES NFR BLD: 0.6 %
LYMPHOCYTES # BLD AUTO: 1.69 X10(3) UL (ref 1–4)
LYMPHOCYTES NFR BLD AUTO: 10.2 %
MCH RBC QN AUTO: 21.4 PG (ref 26–34)
MCHC RBC AUTO-ENTMCNC: 30.8 G/DL (ref 31–37)
MCV RBC AUTO: 69.4 FL
MONOCYTES # BLD AUTO: 1 X10(3) UL (ref 0.1–1)
MONOCYTES NFR BLD AUTO: 6 %
NEUTROPHILS # BLD AUTO: 13.71 X10 (3) UL (ref 1.5–7.7)
NEUTROPHILS # BLD AUTO: 13.71 X10(3) UL (ref 1.5–7.7)
NEUTROPHILS NFR BLD AUTO: 82.9 %
PLATELET # BLD AUTO: 351 10(3)UL (ref 150–450)
RBC # BLD AUTO: 4.77 X10(6)UL
WBC # BLD AUTO: 16.6 X10(3) UL (ref 4–11)

## 2024-01-06 ENCOUNTER — APPOINTMENT (OUTPATIENT)
Dept: GENERAL RADIOLOGY | Facility: HOSPITAL | Age: 49
End: 2024-01-06
Payer: COMMERCIAL

## 2024-01-06 ENCOUNTER — HOSPITAL ENCOUNTER (EMERGENCY)
Facility: HOSPITAL | Age: 49
Discharge: HOME | End: 2024-01-06
Attending: EMERGENCY MEDICINE
Payer: COMMERCIAL

## 2024-01-06 VITALS
OXYGEN SATURATION: 96 % | RESPIRATION RATE: 18 BRPM | DIASTOLIC BLOOD PRESSURE: 65 MMHG | SYSTOLIC BLOOD PRESSURE: 140 MMHG | TEMPERATURE: 98 F | WEIGHT: 170 LBS | BODY MASS INDEX: 32.1 KG/M2 | HEIGHT: 61 IN | HEART RATE: 63 BPM

## 2024-01-06 DIAGNOSIS — M25.572 ACUTE LEFT ANKLE PAIN: Primary | ICD-10-CM

## 2024-01-06 PROCEDURE — 73610 X-RAY EXAM OF ANKLE: CPT | Performed by: EMERGENCY MEDICINE

## 2024-01-06 PROCEDURE — 99283 EMERGENCY DEPT VISIT LOW MDM: CPT

## 2024-01-06 RX ORDER — NAPROXEN 500 MG/1
500 TABLET ORAL 2 TIMES DAILY PRN
Qty: 14 TABLET | Refills: 0 | Status: SHIPPED | OUTPATIENT
Start: 2024-01-06 | End: 2024-01-13

## 2024-01-07 NOTE — ED PROVIDER NOTES
Patient Seen in: [facility] Emergency Department      History     Chief Complaint   Patient presents with    Ankle Pain     Stated Complaint: left ankle pain    Subjective:   HPI    Patient is a 48-year-old female that complains of left ankle pain has been present for the last week or 2.  She recalls no specific injury.  She complains of a sharp pain in the lateral aspect of her talus.  Pain is worse with inversion    Objective:   Past Medical History:   Diagnosis Date    Abdominal pain in female     LLQ. Colonoscopy : NL (10-14-15) except hemorrhoids.     Abnormal Pap smear of cervix     CHAYITO 1    Amenorrhea     Anxiety     Cyst of buttocks     cyst on left buttocks, removed    Cyst of ovary, right     Decorative tattoo     Elevated liver enzymes     Fatty liver.     Esophageal reflux     S/P Lap. Nissen fundoplication.    Hiatal hernia     Incontinence     Umbilical hernia               Past Surgical History:   Procedure Laterality Date    BRAVO 96HR - INTERNAL  2013    [place] 30 1st 48, 16 2nd 48          x 3      06    COLONOSCOPY  2020    EGD  2013    EGD  2020    ESOPHAGEAL MANOMETRY - INTERNAL  2013    normal    EXCIS PRIMARY GANGLION WRIST Left     wrist x2    FOREARM/WRIST SURGERY UNLISTED Left     wrist tendon surgery    HEMORRHOIDECTOMY      internal and anal skin tag removeal. Benign.     LAPAROSCOPIC FUNDOPLASTY  13    hiatal hernia     BONNIE LOCALIZATION WIRE 1 SITE LEFT (CPT=19281) Left 2017    sebaceous cyst    OTHER SURGICAL HISTORY      Lasik    OTHER SURGICAL HISTORY Left     ankle tendon repair    TUBAL LIGATION                  Social History     Socioeconomic History    Marital status:    Occupational History    Occupation: Retired. Was dental ass't.    Tobacco Use    Smoking status: Never    Smokeless tobacco: Never   Vaping Use    Vaping Use: Never used   Substance and Sexual Activity    Alcohol use: No      Alcohol/week: 0.0 standard drinks of alcohol    Drug use: No    Sexual activity: Yes     Partners: Male     Birth control/protection: Tubal Ligation   Other Topics Concern    Caffeine Concern No    Exercise Yes     Comment: walks for 20 minutes, 3 times per week.   Social History Narrative    Noreen is  x 18yrs. She has 3 children. 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 Noreen lives with her family in [place], [place].               Review of Systems    Positive for stated complaint: left ankle pain  Other systems are as noted in HPI.  Constitutional and vital signs reviewed.      All other systems reviewed and negative except as noted above.    Physical Exam     ED Triage Vitals [01/06/24 1906]   /79   Pulse 72   Resp 15   Temp 98 °F (36.7 °C)   Temp src Temporal   SpO2 100 %   O2 Device None (Room air)       Current:/79   Pulse 72   Temp 98 °F (36.7 °C) (Temporal)   Resp 15   Ht 154.9 cm (5' 1\")   Wt 77.1 kg   LMP 10/15/2023 (Within Days)   SpO2 100%   BMI 32.12 kg/m²         Physical Exam    Patient awake alert no distress full exam on her left lower extremity    There is no redness warmth swelling or deformity.  There is no lateral malleolus tenderness there is tenderness over the lateral talus.  There is no calcaneal tenderness no Achilles tenderness no fifth metatarsal tenderness circulation sensorimotor function of her foot is intact.    ED Course   Labs Reviewed - No data to display          Will check x-ray         MDM      Use of independent historian:     I personally reviewed and interpreted the images : No acute fracture seen on x-ray    XR ANKLE (MIN 3 VIEWS), LEFT (CPT=73610)    Result Date: 1/6/2024  CONCLUSION:   2 millimeter ossification adjacent to the cuboid, which appears chronic in nature, but new from the 2011 examination.  This could represent a tiny accessory ossicle or sequela of prior avulsion fracture.      Dictated by (CST): Elaine Butler MD on 1/06/2024 at 8:10 PM      Finalized by (CST): Elaine Butler MD on 1/06/2024 at 8:12 PM           Vitals:    01/06/24 1906   BP: 155/79   Pulse: 72   Resp: 15   Temp: 98 °F (36.7 °C)   TempSrc: Temporal   SpO2: 100%   Weight: 77.1 kg   Height: 154.9 cm (5' 1\")     *I personally reviewed and interpreted all ED vitals.    Pulse Ox: 100%, Room air, Normal       Differential Diagnosis/ Diagnostic Considerations: Left foot pain consider fracture consider sprain consider gout consider arthritic pain.    Medical Record Review: I personally reviewed available prior medical records for any recent pertinent discharge summaries, testing, and procedures and reviewed those reports and found .    Complicating Factors: The patient already has  which contribute to the complexity of this ED evaluation.    Social determinants of health:    Prescription drug management:      Shared Decision Making:    ED Course: X-ray findings shared with patient will splint will provide nonsteroidal anti-inflammatory medicine prescription encourage patient to follow-up with podiatry.    Discussion of management with other healthcare providers:    Condition upon leaving the department: Stable                                     Medical Decision Making      Disposition and Plan     Clinical Impression:  1. Acute left ankle pain         Disposition:  Discharge  1/6/2024  8:37 pm    Follow-up:  Jose Leo DPM  [address]  [address]  [place] [address]  [phone]    Follow up            Medications Prescribed:  Current Discharge Medication List        START taking these medications    Details   naproxen 500 MG Oral Tab Take 1 tablet (500 mg total) by mouth 2 (two) times daily as needed.  Qty: 14 tablet, Refills: 0

## 2024-01-25 ENCOUNTER — MED REC SCAN ONLY (OUTPATIENT)
Dept: INTERNAL MEDICINE CLINIC | Facility: CLINIC | Age: 49
End: 2024-01-25

## 2024-02-09 ENCOUNTER — OFFICE VISIT (OUTPATIENT)
Dept: INTERNAL MEDICINE CLINIC | Facility: CLINIC | Age: 49
End: 2024-02-09

## 2024-02-09 ENCOUNTER — LAB ENCOUNTER (OUTPATIENT)
Dept: LAB | Age: 49
End: 2024-02-09
Payer: COMMERCIAL

## 2024-02-09 VITALS
DIASTOLIC BLOOD PRESSURE: 76 MMHG | BODY MASS INDEX: 33.79 KG/M2 | HEIGHT: 61 IN | HEART RATE: 60 BPM | SYSTOLIC BLOOD PRESSURE: 136 MMHG | OXYGEN SATURATION: 100 % | WEIGHT: 179 LBS

## 2024-02-09 DIAGNOSIS — R19.4 CHANGE IN BOWEL HABITS: ICD-10-CM

## 2024-02-09 DIAGNOSIS — R19.4 CHANGE IN BOWEL HABITS: Primary | ICD-10-CM

## 2024-02-09 DIAGNOSIS — Z86.2 HISTORY OF ANEMIA: ICD-10-CM

## 2024-02-09 LAB
DEPRECATED RDW RBC AUTO: 42.5 FL (ref 35.1–46.3)
ERYTHROCYTE [DISTWIDTH] IN BLOOD BY AUTOMATED COUNT: 17.8 % (ref 11–15)
HCT VFR BLD AUTO: 30.8 %
HGB BLD-MCNC: 9 G/DL
MCH RBC QN AUTO: 19.9 PG (ref 26–34)
MCHC RBC AUTO-ENTMCNC: 29.2 G/DL (ref 31–37)
MCV RBC AUTO: 68.1 FL
PLATELET # BLD AUTO: 413 10(3)UL (ref 150–450)
RBC # BLD AUTO: 4.52 X10(6)UL
WBC # BLD AUTO: 11.6 X10(3) UL (ref 4–11)

## 2024-02-09 PROCEDURE — 3078F DIAST BP <80 MM HG: CPT

## 2024-02-09 PROCEDURE — 85027 COMPLETE CBC AUTOMATED: CPT

## 2024-02-09 PROCEDURE — 99213 OFFICE O/P EST LOW 20 MIN: CPT

## 2024-02-09 PROCEDURE — 3075F SYST BP GE 130 - 139MM HG: CPT

## 2024-02-09 PROCEDURE — 36415 COLL VENOUS BLD VENIPUNCTURE: CPT

## 2024-02-09 PROCEDURE — 3008F BODY MASS INDEX DOCD: CPT

## 2024-02-09 NOTE — PROGRESS NOTES
Subjective:   Vianey Cantu is a 48 year old female who presents for Stomach Pain (Has been having pain on sides of abdomen and having on and off diarrhea and constipation)     C/c changing bowel habits with alternating diarrhea and constipation in the same day. Feels incomplete emptying and has to push/strain with BM. Ranges from small hard girish to loose or liquid. Her stomach gets bloated firm at times and she feels she gets full quickly after eating. Denies abnormal color or blood in the stool. Symptoms started around October - November when she was in the ER for abdominal cramping and diarrhea. WBC were elevated to 16 at the time and CT scan was done - see results below. She has had similar issues in the past but now constant since the ER visit. Dicyclomine helped a little with cramping at the time. Denies current abdominal cramping. Has not noted any food triggers. Recently started some papaya chewable pills for constipation.     Hx anemia and iron deficiency and has gotten iron infusions in the past  Denies heavy periods and is not sure what the cause of her anemia is  Colonoscopy in 2020 was normal     Denies sudden weight loss, no nausea or vomiting   A few times noticed food feels getting stuck in what feels like the lower esophagus and then flushes it down with water and it goes down. Does not recall what she was eating at those times but was nothing out of the ordinary.    Notes the her father had cirrhosis but he was not a drinker and developed liver cancer which spread to the stomach  He had minimal symptoms except for vomiting and then it was already advanced    History/Other:    Chief Complaint Reviewed and Verified  Nursing Notes Reviewed and   Verified  Tobacco Reviewed  Allergies Reviewed  Medications Reviewed    Problem List Reviewed  Medical History Reviewed  Surgical History   Reviewed  OB Status Reviewed  Family History Reviewed         Tobacco:  She has never smoked  tobacco.    Current Outpatient Medications   Medication Sig Dispense Refill    metoprolol succinate ER 25 MG Oral Tablet 24 Hr Take 0.5 tablets (12.5 mg total) by mouth daily. 45 tablet 3    Omeprazole 40 MG Oral Capsule Delayed Release Take 1 capsule (40 mg total) by mouth daily. 90 capsule 3    simvastatin 20 MG Oral Tab Take 1 tablet (20 mg total) by mouth nightly. 90 tablet 3    ibuprofen 800 MG Oral Tab Take 1 tablet (800 mg total) by mouth 2 (two) times daily as needed for Pain. 30 tablet 3    oxybutynin ER 10 MG Oral Tablet 24 Hr Take 1 tablet (10 mg total) by mouth daily. 90 tablet 3    FLUoxetine 20 MG Oral Cap Take 1 capsule (20 mg total) by mouth daily. 90 capsule 1       Review of Systems:  Review of Systems   Constitutional: Negative.  Negative for unexpected weight change.   Respiratory: Negative.     Cardiovascular: Negative.    Gastrointestinal:  Positive for abdominal distention, abdominal pain, constipation, diarrhea and nausea. Negative for anal bleeding, blood in stool and vomiting.   Skin: Negative.    Neurological: Negative.      Objective:   /76   Pulse 60   Ht 5' 1\" (1.549 m)   Wt 179 lb (81.2 kg)   LMP 02/08/2024 (Exact Date)   SpO2 100%   BMI 33.82 kg/m²  Estimated body mass index is 33.82 kg/m² as calculated from the following:    Height as of this encounter: 5' 1\" (1.549 m).    Weight as of this encounter: 179 lb (81.2 kg).  Physical Exam  Vitals reviewed.   Constitutional:       General: She is not in acute distress.     Appearance: Normal appearance. She is well-developed.   Cardiovascular:      Rate and Rhythm: Normal rate and regular rhythm.      Heart sounds: Normal heart sounds.   Pulmonary:      Effort: Pulmonary effort is normal.      Breath sounds: Normal breath sounds.   Abdominal:      General: Bowel sounds are normal.      Palpations: Abdomen is soft. There is no mass.      Tenderness: There is no abdominal tenderness. There is no guarding.   Skin:     General:  Skin is warm and dry.   Neurological:      Mental Status: She is alert and oriented to person, place, and time.       Assessment & Plan:   1. Change in bowel habits (Primary)  -     CBC, Platelet; No Differential; Future; Expected date: 02/09/2024  -     GASTRO - INTERNAL  2. History of anemia  -     CBC, Platelet; No Differential; Future; Expected date: 02/09/2024    Discussed likely IBS   Start daily miralax on top of fiber supplement     JERZY Crawley, 2/9/2024, 3:25 PM

## 2024-02-09 NOTE — PATIENT INSTRUCTIONS
Metamucil supplement daily   Miralax once a day when constipated     Keep a food diary until your appointment   Avoid greasy, fried foods, dairy and spicy foods

## 2024-02-10 DIAGNOSIS — D50.0 IRON DEFICIENCY ANEMIA DUE TO CHRONIC BLOOD LOSS: Primary | ICD-10-CM

## 2024-02-12 ENCOUNTER — TELEPHONE (OUTPATIENT)
Dept: INTERNAL MEDICINE CLINIC | Facility: CLINIC | Age: 49
End: 2024-02-12

## 2024-02-12 NOTE — TELEPHONE ENCOUNTER
Patient called (identified name and ),   Wanted to go over test results.  Reviewed CBC result, and new orders from Dalia BERTRAND for iron studies and stool test for blood.  Also gave her numbers to call GI and Hematology.  Patient agreed to get labs done and make appointments with specialists.

## 2024-02-13 ENCOUNTER — LAB ENCOUNTER (OUTPATIENT)
Dept: LAB | Facility: HOSPITAL | Age: 49
End: 2024-02-13
Payer: COMMERCIAL

## 2024-02-13 DIAGNOSIS — D50.0 IRON DEFICIENCY ANEMIA DUE TO CHRONIC BLOOD LOSS: ICD-10-CM

## 2024-02-13 LAB
DEPRECATED HBV CORE AB SER IA-ACNC: 4.5 NG/ML
IRON SATN MFR SERPL: 6 %
IRON SERPL-MCNC: 29 UG/DL
TIBC SERPL-MCNC: 457 UG/DL (ref 250–425)
TRANSFERRIN SERPL-MCNC: 307 MG/DL (ref 250–380)

## 2024-02-13 PROCEDURE — 82728 ASSAY OF FERRITIN: CPT

## 2024-02-13 PROCEDURE — 84466 ASSAY OF TRANSFERRIN: CPT

## 2024-02-13 PROCEDURE — 83540 ASSAY OF IRON: CPT

## 2024-02-13 PROCEDURE — 36415 COLL VENOUS BLD VENIPUNCTURE: CPT

## 2024-02-14 ENCOUNTER — LAB ENCOUNTER (OUTPATIENT)
Dept: LAB | Facility: HOSPITAL | Age: 49
End: 2024-02-14
Payer: COMMERCIAL

## 2024-02-14 DIAGNOSIS — D50.0 IRON DEFICIENCY ANEMIA DUE TO CHRONIC BLOOD LOSS: ICD-10-CM

## 2024-02-14 LAB — HEMOCCULT STL QL: POSITIVE

## 2024-02-14 PROCEDURE — 82274 ASSAY TEST FOR BLOOD FECAL: CPT

## 2024-02-23 ENCOUNTER — LAB ENCOUNTER (OUTPATIENT)
Dept: LAB | Facility: HOSPITAL | Age: 49
End: 2024-02-23
Attending: INTERNAL MEDICINE
Payer: COMMERCIAL

## 2024-02-23 ENCOUNTER — OFFICE VISIT (OUTPATIENT)
Dept: HEMATOLOGY/ONCOLOGY | Facility: HOSPITAL | Age: 49
End: 2024-02-23
Attending: INTERNAL MEDICINE
Payer: COMMERCIAL

## 2024-02-23 ENCOUNTER — MED REC SCAN ONLY (OUTPATIENT)
Dept: INTERNAL MEDICINE CLINIC | Facility: CLINIC | Age: 49
End: 2024-02-23

## 2024-02-23 VITALS
DIASTOLIC BLOOD PRESSURE: 70 MMHG | BODY MASS INDEX: 33.29 KG/M2 | HEART RATE: 71 BPM | TEMPERATURE: 98 F | WEIGHT: 176.31 LBS | HEIGHT: 61 IN | RESPIRATION RATE: 16 BRPM | OXYGEN SATURATION: 97 % | SYSTOLIC BLOOD PRESSURE: 150 MMHG

## 2024-02-23 DIAGNOSIS — E61.1 IRON DEFICIENCY: ICD-10-CM

## 2024-02-23 DIAGNOSIS — R10.9 ABDOMINAL PAIN, UNSPECIFIED ABDOMINAL LOCATION: ICD-10-CM

## 2024-02-23 DIAGNOSIS — R19.5 LOOSE STOOLS: ICD-10-CM

## 2024-02-23 DIAGNOSIS — R53.83 OTHER FATIGUE: ICD-10-CM

## 2024-02-23 DIAGNOSIS — D72.829 LEUKOCYTOSIS, UNSPECIFIED TYPE: Primary | ICD-10-CM

## 2024-02-23 DIAGNOSIS — R19.5 FECAL OCCULT BLOOD TEST POSITIVE: ICD-10-CM

## 2024-02-23 DIAGNOSIS — D50.9 MICROCYTIC ANEMIA: ICD-10-CM

## 2024-02-23 LAB
ALBUMIN SERPL-MCNC: 4.8 G/DL (ref 3.2–4.8)
ALBUMIN/GLOB SERPL: 2 {RATIO} (ref 1–2)
ALP LIVER SERPL-CCNC: 75 U/L
ALT SERPL-CCNC: 19 U/L
ANION GAP SERPL CALC-SCNC: 5 MMOL/L (ref 0–18)
AST SERPL-CCNC: 15 U/L (ref ?–34)
BASOPHILS # BLD AUTO: 0.06 X10(3) UL (ref 0–0.2)
BASOPHILS NFR BLD AUTO: 0.5 %
BILIRUB SERPL-MCNC: 0.4 MG/DL (ref 0.3–1.2)
BUN BLD-MCNC: 17 MG/DL (ref 9–23)
BUN/CREAT SERPL: 23.6 (ref 10–20)
CALCIUM BLD-MCNC: 9.8 MG/DL (ref 8.7–10.4)
CHLORIDE SERPL-SCNC: 109 MMOL/L (ref 98–112)
CO2 SERPL-SCNC: 28 MMOL/L (ref 21–32)
CREAT BLD-MCNC: 0.72 MG/DL
DEPRECATED RDW RBC AUTO: 42.4 FL (ref 35.1–46.3)
EGFRCR SERPLBLD CKD-EPI 2021: 104 ML/MIN/1.73M2 (ref 60–?)
EOSINOPHIL # BLD AUTO: 0.04 X10(3) UL (ref 0–0.7)
EOSINOPHIL NFR BLD AUTO: 0.3 %
ERYTHROCYTE [DISTWIDTH] IN BLOOD BY AUTOMATED COUNT: 17.8 % (ref 11–15)
FASTING STATUS PATIENT QL REPORTED: NO
FOLATE SERPL-MCNC: 17.7 NG/ML (ref 5.4–?)
GLOBULIN PLAS-MCNC: 2.4 G/DL (ref 2.8–4.4)
GLUCOSE BLD-MCNC: 112 MG/DL (ref 70–99)
HAPTOGLOB SERPL-MCNC: 213 MG/DL (ref 40–280)
HCT VFR BLD AUTO: 31.9 %
HGB BLD-MCNC: 9.6 G/DL
HGB RETIC QN AUTO: 20.2 PG (ref 28.2–36.6)
IMM GRANULOCYTES # BLD AUTO: 0.13 X10(3) UL (ref 0–1)
IMM GRANULOCYTES NFR BLD: 1 %
IMM RETICS NFR: 0.3 RATIO (ref 0.1–0.3)
LDH SERPL L TO P-CCNC: 187 U/L
LYMPHOCYTES # BLD AUTO: 1.86 X10(3) UL (ref 1–4)
LYMPHOCYTES NFR BLD AUTO: 14.9 %
MCH RBC QN AUTO: 20.5 PG (ref 26–34)
MCHC RBC AUTO-ENTMCNC: 30.1 G/DL (ref 31–37)
MCV RBC AUTO: 68.2 FL
MONOCYTES # BLD AUTO: 0.75 X10(3) UL (ref 0.1–1)
MONOCYTES NFR BLD AUTO: 6 %
NEUTROPHILS # BLD AUTO: 9.66 X10 (3) UL (ref 1.5–7.7)
NEUTROPHILS # BLD AUTO: 9.66 X10(3) UL (ref 1.5–7.7)
NEUTROPHILS NFR BLD AUTO: 77.3 %
OSMOLALITY SERPL CALC.SUM OF ELEC: 296 MOSM/KG (ref 275–295)
PLATELET # BLD AUTO: 364 10(3)UL (ref 150–450)
POTASSIUM SERPL-SCNC: 3.9 MMOL/L (ref 3.5–5.1)
PROT SERPL-MCNC: 7.2 G/DL (ref 5.7–8.2)
RBC # BLD AUTO: 4.68 X10(6)UL
RETICS # AUTO: 85.2 X10(3) UL (ref 22.5–147.5)
RETICS/RBC NFR AUTO: 1.8 %
SODIUM SERPL-SCNC: 142 MMOL/L (ref 136–145)
VIT B12 SERPL-MCNC: 512 PG/ML (ref 211–911)
WBC # BLD AUTO: 12.5 X10(3) UL (ref 4–11)

## 2024-02-23 PROCEDURE — 36415 COLL VENOUS BLD VENIPUNCTURE: CPT

## 2024-02-23 PROCEDURE — 82746 ASSAY OF FOLIC ACID SERUM: CPT

## 2024-02-23 PROCEDURE — 82607 VITAMIN B-12: CPT

## 2024-02-23 PROCEDURE — 99214 OFFICE O/P EST MOD 30 MIN: CPT | Performed by: INTERNAL MEDICINE

## 2024-02-23 PROCEDURE — 85025 COMPLETE CBC W/AUTO DIFF WBC: CPT

## 2024-02-23 PROCEDURE — 83615 LACTATE (LD) (LDH) ENZYME: CPT

## 2024-02-23 PROCEDURE — 80053 COMPREHEN METABOLIC PANEL: CPT

## 2024-02-23 PROCEDURE — 85045 AUTOMATED RETICULOCYTE COUNT: CPT

## 2024-02-23 PROCEDURE — 83010 ASSAY OF HAPTOGLOBIN QUANT: CPT

## 2024-02-23 NOTE — PROGRESS NOTES
Hematology Progress Note    Patient Name: Vianey Cantu   YOB: 1975   Medical Record Number: [id number]   CSN: [id number]   Consulting Physician: Darrion Izaguirre MD  Referring Physician(s): Eber Gonzalez MD  Date of Visit: 2/23/2024    Chief complaint:  Iron deficiency anemia, Leukocytosis    History of Present Illness:     Vianey Cantu is a 48 year old female that was seen today in the hematology suite for evaluation of the above condition. Patient with PMH relevant for anxiety that causes sweating day and night presenting for evaluation of lowered iron levels but no signs of anemia.  Patient reports menstrual blood loss is her only site of bleeding.  She reports her cycles last once a month, not associated with heavy flow. Noreen denies any epistaxis, oral pharyngeal bleeding, melena, hematochezia, hematuria.  This patient does not have symptomatic anemia as she denies symptoms of fatigue, chest pain, dyspnea, lightheadedness or dizziness.  Her review of systems is notable for swelling during the day at night as well as alternating constipation with diarrhea.  Patient mentions that oral iron has caused constipation in the past and she did not like the taste of the pills. ROS is otherwise negative.    Interval History:  The patient returns for planned follow-up in light of iron deficiency anemia which has returned. She also has some mild leukocytosis now as well. Her last IV iron tx with Venofer was in 3/2022. Patient is being planned for a GI eval as she is having frequent loose stools and recent tests showed fecal occult positive stool. Noreen reports that her menstrual cycle blood loss is minimal. She denies blood loss from any other orifice. Review of systems is pertinent for decreased appetite and fatigue. Her ROS is otherwise negative.       Past Medical History:  Past Medical History:   Diagnosis Date    Abdominal pain in female     LLQ. Colonoscopy : NL (10-14-15) except hemorrhoids.      Abnormal Pap smear of cervix     CHAYITO 1    Amenorrhea     Anxiety     Cyst of buttocks     cyst on left buttocks, removed    Cyst of ovary, right     Decorative tattoo     Elevated liver enzymes     Fatty liver.     Esophageal reflux     S/P Lap. Nissen fundoplication.    Hiatal hernia     Incontinence     Umbilical hernia        Past Surgical History:  Past Surgical History:   Procedure Laterality Date    BRAVO 96HR - INTERNAL  2013    [place] 30 1st 48, 16 2nd 48          x 3      06    COLONOSCOPY  2020    EGD  2013    EGD  2020    ESOPHAGEAL MANOMETRY - INTERNAL  2013    normal    EXCIS PRIMARY GANGLION WRIST Left     wrist x2    FOREARM/WRIST SURGERY UNLISTED Left     wrist tendon surgery    HEMORRHOIDECTOMY      internal and anal skin tag removeal. Benign.     LAPAROSCOPIC FUNDOPLASTY  13    hiatal hernia     BONNIE LOCALIZATION WIRE 1 SITE LEFT (CPT=19281) Left 2017    sebaceous cyst    OTHER SURGICAL HISTORY      Lasik    OTHER SURGICAL HISTORY Left     ankle tendon repair    TUBAL LIGATION         Family Medical History:  Family History   Problem Relation Age of Onset    Cancer Mother 54        breast cancer     Breast Cancer Mother 54    Cancer Maternal Uncle         melanoma    Diabetes Maternal Grandmother     Stroke Maternal Grandfather     Lipids Father         hyperlipidemia    Diabetes Father     Other (NSVT) Father     Bleeding Disorders Neg     Clotting Disorder Neg        Social History:  Social History     Socioeconomic History    Marital status:      Spouse name: Not on file    Number of children: Not on file    Years of education: Not on file    Highest education level: Not on file   Occupational History    Occupation: Retired. Was dental ass't.    Tobacco Use    Smoking status: Never    Smokeless tobacco: Never   Vaping Use    Vaping Use: Never used   Substance and Sexual Activity    Alcohol use: No     Alcohol/week:  0.0 standard drinks of alcohol    Drug use: No    Sexual activity: Yes     Partners: Male     Birth control/protection: Tubal Ligation   Other Topics Concern     Service Not Asked    Blood Transfusions Not Asked    Caffeine Concern No    Occupational Exposure Not Asked    Hobby Hazards Not Asked    Sleep Concern Not Asked    Stress Concern Not Asked    Weight Concern Not Asked    Special Diet Not Asked    Back Care Not Asked    Exercise Yes     Comment: walks for 20 minutes, 3 times per week.    Bike Helmet Not Asked    Seat Belt Not Asked    Self-Exams Not Asked   Social History Narrative    Noreen is  x 18yrs. She has 3 children. 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 Noreen lives with her family in [place], [place].      Social Determinants of Health     Financial Resource Strain: Not on file   Food Insecurity: Not on file   Transportation Needs: Not on file   Physical Activity: Not on file   Stress: Not on file   Social Connections: Not on file   Housing Stability: Not on file       Allergies:   No Known Allergies    Current Medications:   metoprolol succinate ER 25 MG Oral Tablet 24 Hr Take 0.5 tablets (12.5 mg total) by mouth daily. 45 tablet 3    Omeprazole 40 MG Oral Capsule Delayed Release Take 1 capsule (40 mg total) by mouth daily. 90 capsule 3    simvastatin 20 MG Oral Tab Take 1 tablet (20 mg total) by mouth nightly. 90 tablet 3    ibuprofen 800 MG Oral Tab Take 1 tablet (800 mg total) by mouth 2 (two) times daily as needed for Pain. 30 tablet 3    oxybutynin ER 10 MG Oral Tablet 24 Hr Take 1 tablet (10 mg total) by mouth daily. 90 tablet 3    FLUoxetine 20 MG Oral Cap Take 1 capsule (20 mg total) by mouth daily. 90 capsule 1       Review of Systems:    Constitutional: Negative for anorexia, chills, fevers, negative intermittent night sweats, +decreased appetite , +fatigue  Eyes: Negative for visual disturbance, irritation and redness.  Respiratory: Negative for cough, hemoptysis, chest pain, or  dyspnea on exertion.  Gastrointestinal: Negative for dysphagia, odynophagia, reflux symptoms, nausea, vomiting, change in bowel habits, and +frequent loose stools and abdominal pain   Integument/breast: Negative for rash, skin lesions, and pruritus.  Hematologic/lymphatic: Negative for easy bruising, bleeding, and lymphadenopathy.  Musculoskeletal: Negative for myalgias, arthralgias, muscle weakness.  Neurological: Negative for headaches, dizziness, speech problems, gait problems and weakness.    A comprehensive 14 point review of systems was completed.  Pertinent positives and negatives noted in the the HPI.     Vital Signs:  /70 (BP Location: Left arm, Patient Position: Sitting, Cuff Size: adult)   Pulse 71   Temp 97.8 °F (36.6 °C) (Oral)   Resp 16   Ht 1.549 m (5' 1\")   Wt 80 kg (176 lb 4.8 oz)   LMP 02/08/2024 (Exact Date)   SpO2 97%   BMI 33.31 kg/m²     Physical Examination:    General: Patient is alert and oriented x 3, not in acute distress.  Psych:  Friendly, cooperative with appropriate questions and responses  HEENT: EOMs intact. Oropharynx is clear.   Neck: No palpable lymphadenopathy. Neck is supple.  Lymphatics: There is no palpable lymphadenopathy throughout in the cervical, supraclavicular, axillary, or inguinal regions.  Chest: Clear to auscultation. No wheezes or rales.  Heart: Regular rate and rhythm. S1S2 normal.  Abdomen: Soft, non tender with good bowel sounds.  No hepatosplenomegaly.  No palpable mass.  Extremities: No edema or calf tenderness.  Neurological: Grossly intact.      Labs:    Lab Results   Component Value Date/Time    WBC 11.6 (H) 02/09/2024 04:10 PM    RBC 4.52 02/09/2024 04:10 PM    HGB 9.0 (L) 02/09/2024 04:10 PM    HCT 30.8 (L) 02/09/2024 04:10 PM    MCV 68.1 (L) 02/09/2024 04:10 PM    MCH 19.9 (L) 02/09/2024 04:10 PM    MCHC 29.2 (L) 02/09/2024 04:10 PM    RDW 17.8 (H) 02/09/2024 04:10 PM    NEPRELIM 13.71 (H) 11/18/2023 03:24 PM    .0 02/09/2024 04:10 PM        Lab Results   Component Value Date/Time    GLU 84 11/18/2023 03:24 PM    BUN 18 11/18/2023 03:24 PM    CREATSERUM 0.65 11/18/2023 03:24 PM    GFRNAA 101 07/23/2022 11:13 AM    CA 9.2 11/18/2023 03:24 PM    ALB 4.6 11/18/2023 03:24 PM     11/18/2023 03:24 PM    K 3.7 11/18/2023 03:24 PM     11/18/2023 03:24 PM    CO2 23.0 11/18/2023 03:24 PM    ALKPHO 76 11/18/2023 03:24 PM    AST 17 11/18/2023 03:24 PM    ALT 19 11/18/2023 03:24 PM      Latest Reference Range & Units 02/13/24 08:24   Iron, Serum 50 - 170 ug/dL 29 (L)   Transferrin 250 - 380 mg/dL 307   Iron Bind.Cap.(TIBC) 250 - 425 ug/dL 457 (H)   Iron Saturation 15 - 50 % 6 (L)   FERRITIN 12.0 - 240.0 ng/mL 4.5 (L)   (L): Data is abnormally low  (H): Data is abnormally high      Impression:      ICD-10-CM    1. Leukocytosis, unspecified type  D72.829       2. Microcytic anemia  D50.9 CBC With Differential With Platelet     Folic Acid Serum (Folate)     Vitamin B12     Reticulocyte Count     LDH     Haptoglobin     Comp Metabolic Panel (14)      3. Iron deficiency  E61.1       4. Loose stools  R19.5       5. Fecal occult blood test positive  R19.5       6. Other fatigue  R53.83       7. Abdominal pain, unspecified abdominal location  R10.9         48 year old W with PMH relevant for anxiety that causes sweating day and night presenting for follow up of iron deficiency anemia with leukocytosis, frequent loose stools, abdominal pain and fecal occult test positive results    Plan:    1.) Iron Deficiency Anemia    --prior endoscopy procedures with Dr. Lenard Harding  --Final pathology from EGD with colonoscopy negative for malignancy on 11/11/2020    --Patient has monthly blood loss with menses as her only site of bleeding, not a vegetarian. Denies blood loss from any other orifice;however, has a positive fecal occult blood test  --she has symptomatic anemia reported as fatigue  --will check for other causes of anemia  --rec pRBC for hgb <7  --reviewed  her iron studies; c/w AGA  --She will be treated with IV iron using Venofer again and f/u in 8wks    2.) Fecal Occult blood test with loose stools    --suspect this pt is bleeding from the bowel. She will see Tyrel Thurston soon in GI  --possibly an inflammatory bowel condition, but would strongly rec a colonoscopy as she is having abdominal pain    MDM: Moderate Risk    Darrion Izaguirre MD  [facility]  [address] [address]

## 2024-03-05 ENCOUNTER — HOSPITAL ENCOUNTER (OUTPATIENT)
Dept: GENERAL RADIOLOGY | Facility: HOSPITAL | Age: 49
Discharge: HOME | End: 2024-03-05
Attending: STUDENT IN AN ORGANIZED HEALTH CARE EDUCATION/TRAINING PROGRAM
Payer: COMMERCIAL

## 2024-03-05 ENCOUNTER — TELEPHONE (OUTPATIENT)
Facility: CLINIC | Age: 49
End: 2024-03-05

## 2024-03-05 ENCOUNTER — LAB ENCOUNTER (OUTPATIENT)
Dept: LAB | Facility: HOSPITAL | Age: 49
End: 2024-03-05
Attending: STUDENT IN AN ORGANIZED HEALTH CARE EDUCATION/TRAINING PROGRAM
Payer: COMMERCIAL

## 2024-03-05 ENCOUNTER — OFFICE VISIT (OUTPATIENT)
Facility: CLINIC | Age: 49
End: 2024-03-05
Payer: COMMERCIAL

## 2024-03-05 VITALS
WEIGHT: 179 LBS | DIASTOLIC BLOOD PRESSURE: 75 MMHG | HEART RATE: 65 BPM | HEIGHT: 61 IN | BODY MASS INDEX: 33.79 KG/M2 | SYSTOLIC BLOOD PRESSURE: 145 MMHG

## 2024-03-05 DIAGNOSIS — R19.7 DIARRHEA, UNSPECIFIED TYPE: Primary | ICD-10-CM

## 2024-03-05 DIAGNOSIS — R19.7 DIARRHEA, UNSPECIFIED TYPE: ICD-10-CM

## 2024-03-05 DIAGNOSIS — R14.0 ABDOMINAL BLOATING: ICD-10-CM

## 2024-03-05 DIAGNOSIS — D50.9 IRON DEFICIENCY ANEMIA, UNSPECIFIED IRON DEFICIENCY ANEMIA TYPE: ICD-10-CM

## 2024-03-05 LAB
CRP SERPL-MCNC: 1.2 MG/DL (ref ?–1)
IGA SERPL-MCNC: 140.6 MG/DL (ref 40–350)

## 2024-03-05 PROCEDURE — 74018 RADEX ABDOMEN 1 VIEW: CPT | Performed by: STUDENT IN AN ORGANIZED HEALTH CARE EDUCATION/TRAINING PROGRAM

## 2024-03-05 PROCEDURE — 36415 COLL VENOUS BLD VENIPUNCTURE: CPT

## 2024-03-05 PROCEDURE — 86140 C-REACTIVE PROTEIN: CPT

## 2024-03-05 PROCEDURE — 83993 ASSAY FOR CALPROTECTIN FECAL: CPT

## 2024-03-05 PROCEDURE — 3008F BODY MASS INDEX DOCD: CPT | Performed by: STUDENT IN AN ORGANIZED HEALTH CARE EDUCATION/TRAINING PROGRAM

## 2024-03-05 PROCEDURE — 3077F SYST BP >= 140 MM HG: CPT | Performed by: STUDENT IN AN ORGANIZED HEALTH CARE EDUCATION/TRAINING PROGRAM

## 2024-03-05 PROCEDURE — 82784 ASSAY IGA/IGD/IGG/IGM EACH: CPT

## 2024-03-05 PROCEDURE — 3078F DIAST BP <80 MM HG: CPT | Performed by: STUDENT IN AN ORGANIZED HEALTH CARE EDUCATION/TRAINING PROGRAM

## 2024-03-05 PROCEDURE — 86364 TISS TRNSGLTMNASE EA IG CLAS: CPT

## 2024-03-05 PROCEDURE — 99244 OFF/OP CNSLTJ NEW/EST MOD 40: CPT | Performed by: STUDENT IN AN ORGANIZED HEALTH CARE EDUCATION/TRAINING PROGRAM

## 2024-03-05 PROCEDURE — 87493 C DIFF AMPLIFIED PROBE: CPT

## 2024-03-05 NOTE — TELEPHONE ENCOUNTER
Patient was seen in office today. Going to call back later today. Hold is placed on 3/11/2024 at [facility] at 8:30 am.     Phone room - Please transfer to surgery schedulers at [phone].     Surgery schedulers - FYI      Orders from Dr. Thurston:    1. Schedule colonoscopy with MAC [Diagnosis: diarrhea, iron deficiency] AND EGD with MAC [Diagnosis: diarrhea, iron deficiency]     2.  bowel prep from pharmacy (split dose golytely)     3. Continue all medications for procedure

## 2024-03-05 NOTE — H&P
[facility] - Gastroenterology                                                                                                               Reason for consult: abdominal bloating, diarrhea, anemia    Requesting physician or provider: Eber Gonzalez MD    Chief Complaint   Patient presents with    Consult     Stomach issues since October; changes in bowels; after eating needs to have BM right away; bloating     HPI:   Vianey Cantu is a 48 year old year-old woman with history of hiatal hernia and acid reflux status post laparoscopic Nissen fundoplication in 2013 (per chart), hepatic steatosis who presents to GI clinic to discuss symptoms of abdominal bloating and diarrhea.    The patient had an upper endoscopy and colonoscopy in 2020 which were unremarkable   For an etiology of iron deficiency and it was felt to be related to menstruation.    She denies heavy menstruation.  She denies blood in the stool.  She did have a FOBT that was positive in the office.  She reports having loose stools that are brown in color.    She has a good appetite and denies weight loss, nausea, vomiting, dysphagia.    She was seen by hematology who believes etiology of iron deficiency is related to GI blood loss.    She reports a strong urge to defecate after eating.  She reports running to the bathroom to have a loose stool.  She rarely feels constipated.  She had a CT scan of the abdomen pelvis 6 months ago that was unremarkable.    Prior endoscopies:  2020 -- EGD/Colonoscopy both unremarkable.   10/2015 -- colonoscopy unremarkable  9/2014 -- colonoscopy with hemorrhoids, diverticulosis  2013 -- EGD with reflux changes.     Soc:  -no smoking  -no Etoh    Wt Readings from Last 6 Encounters:   03/05/24 179 lb (81.2 kg)   02/23/24 176 lb 4.8 oz (80 kg)   02/09/24 179 lb (81.2 kg)   01/06/24 170 lb (77.1 kg)   11/18/23 180 lb (81.6 kg)   06/26/23  178 lb (80.7 kg)        History, Medications, Allergies, ROS:      Past Medical History:   Diagnosis Date    Abdominal pain in female     LLQ. Colonoscopy : NL (10-14-15) except hemorrhoids.     Abnormal Pap smear of cervix     CHAYITO 1    Amenorrhea     Anemia     Anxiety     Cyst of buttocks     cyst on left buttocks, removed    Cyst of ovary, right     Decorative tattoo     Elevated liver enzymes     Fatty liver.     Esophageal reflux     S/P Lap. Nissen fundoplication.    Hiatal hernia     Incontinence     Umbilical hernia       Past Surgical History:   Procedure Laterality Date    BRAVO 96HR - INTERNAL  2013    [place]ter 30 1st 48, 16 2nd 48          x 3      2006    COLONOSCOPY  2020    COLONOSCOPY      EGD  2013    EGD  2020    ESOPHAGEAL MANOMETRY - INTERNAL  2013    normal    EXCIS PRIMARY GANGLION WRIST Left     wrist x2    FOREARM/WRIST SURGERY UNLISTED Left     wrist tendon surgery    HEMORRHOIDECTOMY  2015    internal and anal skin tag removeal. Benign.     LAPAROSCOPIC FUNDOPLASTY  2013    hiatal hernia     BONNIE LOCALIZATION WIRE 1 SITE LEFT (CPT=19281) Left 2017    sebaceous cyst    OTHER SURGICAL HISTORY      Lasik    OTHER SURGICAL HISTORY Left     ankle tendon repair    TUBAL LIGATION  2014      Family Hx:   Family History   Problem Relation Age of Onset    Cancer Mother 54        breast cancer     Breast Cancer Mother 54    Cancer Maternal Uncle         melanoma    Diabetes Maternal Grandmother     Stroke Maternal Grandfather     Lipids Father         hyperlipidemia    Diabetes Father     Other (NSVT) Father     Cancer Father         Liver cancer    Bleeding Disorders Neg     Clotting Disorder Neg       Social History:   Social History     Socioeconomic History    Marital status:    Occupational History    Occupation: Retired. Was dental ass't.    Tobacco Use    Smoking status: Never    Smokeless tobacco: Never   Vaping Use     Vaping Use: Never used   Substance and Sexual Activity    Alcohol use: No    Drug use: Never    Sexual activity: Yes     Partners: Male     Birth control/protection: Tubal Ligation   Other Topics Concern    Caffeine Concern No    Exercise Yes     Comment: walks for 20 minutes, 3 times per week.   Social History Narrative    Noreen is  x 18yrs. She has 3 children. 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 Noreen lives with her family in [place], [place].         Medications (Active prior to today's visit):  Current Outpatient Medications   Medication Sig Dispense Refill    metoprolol succinate ER 25 MG Oral Tablet 24 Hr Take 0.5 tablets (12.5 mg total) by mouth daily. 45 tablet 3    Omeprazole 40 MG Oral Capsule Delayed Release Take 1 capsule (40 mg total) by mouth daily. 90 capsule 3    simvastatin 20 MG Oral Tab Take 1 tablet (20 mg total) by mouth nightly. 90 tablet 3    ibuprofen 800 MG Oral Tab Take 1 tablet (800 mg total) by mouth 2 (two) times daily as needed for Pain. 30 tablet 3    oxybutynin ER 10 MG Oral Tablet 24 Hr Take 1 tablet (10 mg total) by mouth daily. 90 tablet 3    FLUoxetine 20 MG Oral Cap Take 1 capsule (20 mg total) by mouth daily. 90 capsule 1       Allergies:  No Known Allergies    ROS:   CONSTITUTIONAL:  negative for fevers, rigors  EYES:  negative for diplopia   RESPIRATORY:  negative for severe shortness of breath  CARDIOVASCULAR:  negative for crushing sub-sternal chest pain  GASTROINTESTINAL:  see HPI  GENITOURINARY:  negative for dysuria or gross hematuria  INTEGUMENT/BREAST:  SKIN:  negative for jaundice   ALLERGIC/IMMUNOLOGIC:  negative for hay fever  ENDOCRINE:  negative for cold intolerance and heat intolerance  MUSCULOSKELETAL:  negative for joint effusion/severe erythema  BEHAVIOR/PSYCH:  negative for psychotic behavior      PHYSICAL EXAM:   Blood pressure 145/75, pulse 65, height 5' 1\" (1.549 m), weight 179 lb (81.2 kg), last menstrual period 02/08/2024, not currently  breastfeeding.    Gen- Patient appears comfortable and in no acute discomfort  HEENT: the sclera appears anicteric, oropharynx clear, mucus membranes appear moist  CV- regular rate and rhythm, the extremities are warm and well perfused   Lung- Moves air well; No labored breathing  Abdomen- soft, non-tender exam in all quadrants without rigidity or guarding, non-distended  Skin- No jaundice  Ext: no edema is evident.   Neuro- Alert and interactive, and gross movements of extremities normal  Psych - appropriate, non-agitated    Labs/Imaging:     Patient's pertinent labs and imaging were reviewed and discussed with patient today.      ASSESSMENT/PLAN:   Vianey Cantu is a 48 year old year-old woman with history of hiatal hernia and acid reflux status post laparoscopic Nissen fundoplication in 2013, hepatic steatosis who presents to GI clinic to discuss symptoms of abdominal bloating and diarrhea.    #Diarrhea  #Abdominal bloating  #Anemia with iron deficiency  The patient has longstanding issues of abdominal pain and altered bowel habits.  She has been seen by gastroenterology many times since 2013 and has undergone 3 colonoscopies and 3 EGDs since that time.  Without an etiology of her discomfort or diarrhea.  She seems alternate between constipation and diarrhea and her abdominal discomfort does not always get better with bowel movement.    She may have irritable bowel syndrome versus food intolerance.  May have microscopic colitis as a cause of diarrhea but not slightly bloating.  She also has status post cholecystectomy as may have bile acid diarrhea.  Abdominal bloating may be functional in nature.    She has had extensive evaluation as noted above including CT imaging as well as several EGDs and colonoscopies.  I do not think that the positive FOBT is related to blood loss from the GI tract considering extensive endoscopic evaluation.    However, she is anemic with iron deficiency and now has a positive fit and  therefore we will proceed with colonoscopy and upper endoscopy for further evaluation of anemia, diarrhea, bloating.    Recommendations:  **xray, labs, stool test today**    1. Schedule colonoscopy with MAC [Diagnosis: diarrhea, iron deficiency] AND EGD with MAC [Diagnosis: diarrhea, iron deficiency]    2.  bowel prep from pharmacy (split dose golytely)    3. Continue all medications for procedure    Orders This Visit:  No orders of the defined types were placed in this encounter.    Meds This Visit:  Requested Prescriptions      No prescriptions requested or ordered in this encounter     Imaging & Referrals:  None     Tyrel Thurston MD  [facility]  3/5/2024    This note was partially prepared using Dragon Medical voice recognition dictation software. As a result, errors may occur. When identified, these errors have been corrected. While every attempt is made to correct errors during dictation, discrepancies may still exist.

## 2024-03-05 NOTE — PATIENT INSTRUCTIONS
**xray, labs, stool test today**    1. Schedule colonoscopy with MAC [Diagnosis: diarrhea, iron deficiency] AND EGD with MAC [Diagnosis: diarrhea, iron deficiency]    2.  bowel prep from pharmacy (split dose golytely)    3. Continue all medications for procedure    4. Read all bowel prep instructions carefully    5. AVOID seeds, nuts, popcorn, raw fruits and vegetables (cooked is okay) for 2-3 days before procedure    6. If you start any NEW medication after your visit today, please notify us. Certain medications will need to be held before the procedure, or the procedure cannot be performed.

## 2024-03-06 ENCOUNTER — LAB ENCOUNTER (OUTPATIENT)
Dept: LAB | Facility: HOSPITAL | Age: 49
End: 2024-03-06
Attending: STUDENT IN AN ORGANIZED HEALTH CARE EDUCATION/TRAINING PROGRAM
Payer: COMMERCIAL

## 2024-03-06 DIAGNOSIS — R19.7 DIARRHEA, UNSPECIFIED TYPE: ICD-10-CM

## 2024-03-06 LAB
C DIFF TOX B STL QL: NEGATIVE
TTG IGA SER-ACNC: <0.2 U/ML (ref ?–7)

## 2024-03-06 NOTE — PROGRESS NOTES
Labs reviewed, mychart sent to patient.    Celiac serologies negative.    CRP mildly elevated.    EGD/Colonoscopy planned for next week.

## 2024-03-06 NOTE — TELEPHONE ENCOUNTER
Contacted and spoke with patient. Verified she is now on the schedule for 3/11/2024 for her procedures. Informed her I will send prep instructions via [employer] and she verbalized understanding.     Scheduled for:  Colonoscopy 70632 & EGD 45393  Provider Name:  Dr. Thurston  Date:  3/11/2024  Location:  [facility]  Sedation:  MAC  Time:  8:30 am, arrival 7:30 am  Prep:  Golytely  Meds/Allergies Reconciled?:  Physician reviewed  Diagnosis with codes:  Diarrhea R19.7; AGA D50.9  Was patient informed to call insurance with codes (Y/N):  Yes  Referral sent?:  Referral was sent at the time of electronic surgical scheduling.  EM or [facility] notified?:  I sent an electronic request to Endo Scheduling and received a confirmation today.    Medication Orders:  N/A  Misc Orders:  N/A     Further instructions given by staff:  Updated instructions sent to patient via [employer].

## 2024-03-06 NOTE — TELEPHONE ENCOUNTER
Patient would like to accept appointment for 3/11/2024. Patient states that she will be working so if she is not able to answer the call back she is giving the nurse verbal consent to leave her a detailed voicemail message. I tried to reach the surgery scheduler, but not available.

## 2024-03-06 NOTE — TELEPHONE ENCOUNTER
Called fund office at [phone], spoke with Darlin, no PA needed, reference number [id number].Referral updated.

## 2024-03-07 ENCOUNTER — TELEPHONE (OUTPATIENT)
Dept: HEMATOLOGY/ONCOLOGY | Facility: HOSPITAL | Age: 49
End: 2024-03-07

## 2024-03-07 LAB — CALPROTECTIN STL-MCNT: 182 ΜG/G (ref ?–50)

## 2024-03-07 NOTE — PROGRESS NOTES
Calprotectin mildly elevated -- plan for colonoscopy next week. C. Diff negative.    Mychart sent to patient.

## 2024-03-07 NOTE — TELEPHONE ENCOUNTER
Patient is calling to make an appointment for an Iron Infusion, she received a call on 3/6/24. Called 3/7/24.

## 2024-03-07 NOTE — PROGRESS NOTES
Moderate stool burden in the right colon.    Also has elevated CRP/calprotectin and complaint of diarrhea.    Further investigating next week. Will consider treatment of constipation pending result of Egd/Colon.    Mychart sent to patient.

## 2024-03-11 ENCOUNTER — ANESTHESIA (OUTPATIENT)
Dept: ENDOSCOPY | Age: 49
End: 2024-03-11
Payer: COMMERCIAL

## 2024-03-11 ENCOUNTER — ANESTHESIA EVENT (OUTPATIENT)
Dept: ENDOSCOPY | Age: 49
End: 2024-03-11
Payer: COMMERCIAL

## 2024-03-11 ENCOUNTER — HOSPITAL ENCOUNTER (OUTPATIENT)
Age: 49
Setting detail: HOSPITAL OUTPATIENT SURGERY
Discharge: HOME | End: 2024-03-11
Attending: STUDENT IN AN ORGANIZED HEALTH CARE EDUCATION/TRAINING PROGRAM | Admitting: STUDENT IN AN ORGANIZED HEALTH CARE EDUCATION/TRAINING PROGRAM
Payer: COMMERCIAL

## 2024-03-11 VITALS
SYSTOLIC BLOOD PRESSURE: 114 MMHG | HEIGHT: 61 IN | RESPIRATION RATE: 22 BRPM | HEART RATE: 55 BPM | WEIGHT: 179 LBS | OXYGEN SATURATION: 100 % | BODY MASS INDEX: 33.79 KG/M2 | DIASTOLIC BLOOD PRESSURE: 64 MMHG

## 2024-03-11 DIAGNOSIS — R19.7 DIARRHEA, UNSPECIFIED TYPE: ICD-10-CM

## 2024-03-11 DIAGNOSIS — D50.9 IRON DEFICIENCY ANEMIA, UNSPECIFIED IRON DEFICIENCY ANEMIA TYPE: ICD-10-CM

## 2024-03-11 LAB — B-HCG UR QL: NEGATIVE

## 2024-03-11 PROCEDURE — 45380 COLONOSCOPY AND BIOPSY: CPT | Performed by: STUDENT IN AN ORGANIZED HEALTH CARE EDUCATION/TRAINING PROGRAM

## 2024-03-11 PROCEDURE — 99070 SPECIAL SUPPLIES PHYS/QHP: CPT | Performed by: STUDENT IN AN ORGANIZED HEALTH CARE EDUCATION/TRAINING PROGRAM

## 2024-03-11 PROCEDURE — 43239 EGD BIOPSY SINGLE/MULTIPLE: CPT | Performed by: STUDENT IN AN ORGANIZED HEALTH CARE EDUCATION/TRAINING PROGRAM

## 2024-03-11 RX ORDER — SODIUM CHLORIDE, SODIUM LACTATE, POTASSIUM CHLORIDE, CALCIUM CHLORIDE 600; 310; 30; 20 MG/100ML; MG/100ML; MG/100ML; MG/100ML
INJECTION, SOLUTION INTRAVENOUS CONTINUOUS
Status: DISCONTINUED | OUTPATIENT
Start: 2024-03-11 | End: 2024-03-11

## 2024-03-11 RX ORDER — SODIUM CHLORIDE, SODIUM LACTATE, POTASSIUM CHLORIDE, CALCIUM CHLORIDE 600; 310; 30; 20 MG/100ML; MG/100ML; MG/100ML; MG/100ML
INJECTION, SOLUTION INTRAVENOUS CONTINUOUS PRN
Status: DISCONTINUED | OUTPATIENT
Start: 2024-03-11 | End: 2024-03-11 | Stop reason: SURG

## 2024-03-11 RX ORDER — LIDOCAINE HYDROCHLORIDE 10 MG/ML
INJECTION, SOLUTION EPIDURAL; INFILTRATION; INTRACAUDAL; PERINEURAL AS NEEDED
Status: DISCONTINUED | OUTPATIENT
Start: 2024-03-11 | End: 2024-03-11 | Stop reason: SURG

## 2024-03-11 RX ORDER — NALOXONE HYDROCHLORIDE 0.4 MG/ML
0.08 INJECTION, SOLUTION INTRAMUSCULAR; INTRAVENOUS; SUBCUTANEOUS ONCE AS NEEDED
Status: DISCONTINUED | OUTPATIENT
Start: 2024-03-11 | End: 2024-03-11

## 2024-03-11 RX ADMIN — SODIUM CHLORIDE, SODIUM LACTATE, POTASSIUM CHLORIDE, CALCIUM CHLORIDE: 600; 310; 30; 20 INJECTION, SOLUTION INTRAVENOUS at 08:08:00

## 2024-03-11 RX ADMIN — SODIUM CHLORIDE, SODIUM LACTATE, POTASSIUM CHLORIDE, CALCIUM CHLORIDE: 600; 310; 30; 20 INJECTION, SOLUTION INTRAVENOUS at 08:42:00

## 2024-03-11 RX ADMIN — LIDOCAINE HYDROCHLORIDE 50 MG: 10 INJECTION, SOLUTION EPIDURAL; INFILTRATION; INTRACAUDAL; PERINEURAL at 08:10:00

## 2024-03-11 NOTE — ANESTHESIA PREPROCEDURE EVALUATION
Anesthesia PreOp Note    HPI:     Vianey Cantu is a 48 year old female who presents for preoperative consultation requested by: Tyrel Thurston MD    Date of Surgery: 3/11/2024    Procedure(s):  COLONOSCOPY / ESOPHAGOGASTRODUODENOSCOPY  ESOPHAGOGASTRODUODENOSCOPY (EGD)  Indication: Diarrhea, unspecified type / Iron deficiency anemia, unspecified iron deficiency anemia type    Relevant Problems   No relevant active problems       NPO:  Last Liquid Consumption Date: 03/10/24  Last Liquid Consumption Time: 2300  Last Solid Consumption Date: 03/10/24  Last Solid Consumption Time: 1100  Last Liquid Consumption Date: 03/10/24          History Review:  Patient Active Problem List    Diagnosis Date Noted    Leukocytosis 02/23/2024    Microcytic anemia 02/23/2024    Loose stools 02/23/2024    Fecal occult blood test positive 02/23/2024    Other fatigue 02/23/2024    Abdominal pain 02/23/2024    Hypertension 05/18/2023    Dyslipidemia 05/18/2023    Anxiety 05/18/2023    Gastroesophageal reflux disease without esophagitis 05/18/2023    Lipoma of back 10/29/2022    Degeneration of lumbar intervertebral disc 11/17/2021    Spinal stenosis in cervical region 11/17/2021    Cholecystitis 12/18/2020    Spasm of abdominal muscles 12/04/2020    Vaccine counseling 10/20/2020    Acute medial meniscus tear of left knee 05/29/2020    Bone marrow edema 05/29/2020    Synovitis of left knee 05/29/2020    Breast cancer screening 03/29/2020    ASCUS with positive high risk HPV cervical 08/06/2019    Iron deficiency 04/26/2019    Mastalgia in female 06/26/2018    Iron deficiency anemia due to chronic blood loss 04/24/2018    Adult general medical exam 03/02/2017    Bleeding internal hemorrhoids 04/16/2015    Anal skin tag 04/16/2015    Anal pruritus 04/16/2015    Migraine 04/13/2015    Elevated liver enzymes     Mixed hyperlipidemia 10/03/2013    Ganglion of tendon sheath 06/20/2013    Routine general medical examination at [facility]  facility 2013    Abnormal weight gain 02/15/2013    Esophageal reflux     Cervical high risk human papillomavirus (HPV) DNA test positive 10/18/2012    ASCUS (atypical squamous cells of undetermined significance) on Pap smear 2012       Past Medical History:   Diagnosis Date    Abdominal pain in female     LLQ. Colonoscopy : NL (10-14-15) except hemorrhoids.     Abnormal Pap smear of cervix     CHAYITO 1    Amenorrhea     Anemia     Anxiety     Cyst of buttocks     cyst on left buttocks, removed    Cyst of ovary, right     Decorative tattoo     Elevated liver enzymes     Fatty liver.     Esophageal reflux     S/P Lap. Nissen fundoplication.    Hiatal hernia     High blood pressure     High cholesterol     Incontinence     Umbilical hernia        Past Surgical History:   Procedure Laterality Date    BRAVO 96HR - INTERNAL  2013    [place] 30 1st 48, 16 2nd 48          x 3      2006    CHOLECYSTECTOMY      COLONOSCOPY  2020    COLONOSCOPY      EGD  2013    EGD  2020    ESOPHAGEAL MANOMETRY - INTERNAL  2013    normal    EXCIS PRIMARY GANGLION WRIST Left     wrist x2    FOREARM/WRIST SURGERY UNLISTED Left     wrist tendon surgery    HEMORRHOIDECTOMY  2015    internal and anal skin tag removeal. Benign.     HERNIA SURGERY      LAPAROSCOPIC FUNDOPLASTY  2013    hiatal hernia     BONNIE LOCALIZATION WIRE 1 SITE LEFT (CPT=19281) Left 2017    sebaceous cyst    OTHER SURGICAL HISTORY      Lasik    OTHER SURGICAL HISTORY Left     ankle tendon repair    TUBAL LIGATION         Medications Prior to Admission   Medication Sig Dispense Refill Last Dose    metoprolol succinate ER 25 MG Oral Tablet 24 Hr Take 0.5 tablets (12.5 mg total) by mouth daily. 45 tablet 3 3/10/2024 at 2230    simvastatin 20 MG Oral Tab Take 1 tablet (20 mg total) by mouth nightly. 90 tablet 3 3/10/2024 at 2230    ibuprofen 800 MG Oral Tab Take 1 tablet (800 mg total) by mouth 2  (two) times daily as needed for Pain. 30 tablet 3  at PRN    oxybutynin ER 10 MG Oral Tablet 24 Hr Take 1 tablet (10 mg total) by mouth daily. 90 tablet 3 3/10/2024 at 2230    FLUoxetine 20 MG Oral Cap Take 1 capsule (20 mg total) by mouth daily. 90 capsule 1 3/11/2024 at 0630    polyethylene glycol, PEG 3350-KCl-NaBcb-NaCl-NaSulf, 236 g Oral Recon Soln Take 4,000 mL by mouth As Directed. Take 2,000 mL the night before your procedure and 2,000 mL the morning of your procedure. 1 each 0     [] naproxen 500 MG Oral Tab Take 1 tablet (500 mg total) by mouth 2 (two) times daily as needed. 14 tablet 0     [] naproxen 500 MG Oral Tab Take 1 tablet (500 mg total) by mouth 2 (two) times daily as needed. 14 tablet 0     Omeprazole 40 MG Oral Capsule Delayed Release Take 1 capsule (40 mg total) by mouth daily. 90 capsule 3 3/11/2024 at 0630     No current [facility]-ordered facility-administered medications on file.     No current [facility]-ordered outpatient medications on file.       No Known Allergies    Family History   Problem Relation Age of Onset    Cancer Mother 54        breast cancer     Breast Cancer Mother 54    Cancer Maternal Uncle         melanoma    Diabetes Maternal Grandmother     Stroke Maternal Grandfather     Lipids Father         hyperlipidemia    Diabetes Father     Other (NSVT) Father     Cancer Father         Liver cancer    Bleeding Disorders Neg     Clotting Disorder Neg      Social History     Socioeconomic History    Marital status:    Occupational History    Occupation: Retired. Was dental ass't.    Tobacco Use    Smoking status: Never    Smokeless tobacco: Never   Vaping Use    Vaping Use: Never used   Substance and Sexual Activity    Alcohol use: No    Drug use: Never    Sexual activity: Yes     Partners: Male     Birth control/protection: Tubal Ligation   Other Topics Concern    Caffeine Concern No    Exercise Yes     Comment: walks for 20 minutes, 3 times per week.       Available  pre-op labs reviewed.  Lab Results   Component Value Date    WBC 12.5 (H) 02/23/2024    RBC 4.68 02/23/2024    HGB 9.6 (L) 02/23/2024    HCT 31.9 (L) 02/23/2024    MCV 68.2 (L) 02/23/2024    MCH 20.5 (L) 02/23/2024    MCHC 30.1 (L) 02/23/2024    RDW 17.8 (H) 02/23/2024    .0 02/23/2024    URINEPREG Negative 03/11/2024     Lab Results   Component Value Date     02/23/2024    K 3.9 02/23/2024     02/23/2024    CO2 28.0 02/23/2024    BUN 17 02/23/2024    CREATSERUM 0.72 02/23/2024     (H) 02/23/2024    CA 9.8 02/23/2024          Vital Signs:  Body mass index is 33.82 kg/m².   height is 1.549 m (5' 1\") and weight is 81.2 kg (179 lb). Her blood pressure is 133/68 and her pulse is 68. Her respiration is 19 and oxygen saturation is 100%.   Vitals:    03/07/24 0822 03/11/24 0735   BP:  133/68   Pulse:  68   Resp:  19   SpO2:  100%   Weight: 81.2 kg (179 lb)    Height: 1.549 m (5' 1\")         Anesthesia Evaluation      Airway   Mallampati: II  TM distance: >3 FB  Neck ROM: full  Dental          Pulmonary - negative ROS and normal exam   Cardiovascular - normal exam    Neuro/Psych      GI/Hepatic/Renal      Endo/Other - negative ROS   Abdominal                  Anesthesia Plan:   ASA:  2  Plan:   MAC  Plan Comments: I have discussed the anesthetic plan, major risks and alternatives with the patient and answered all questions. The patient desires to proceed with surgery and anesthesia as planned.     Informed Consent Plan and Risks Discussed With:  Patient      I have informed Noreen Cantu and/or legal guardian or family member of the nature of the anesthetic plan, benefits, risks including possible dental damage if relevant, major complications, and any alternative forms of anesthetic management.   All of the patient's questions were answered to the best of my ability. The patient desires the anesthetic management as planned.  TRACEY CHRISTOPHER DO  3/11/2024 8:07 AM  Present on  Admission:  **None**

## 2024-03-11 NOTE — OPERATIVE REPORT
ESOPHAGOGASTRODUODENOSCOPY (EGD) & COLONOSCOPY REPORT    Vianey Cantu     1975 Age 48 year old   PCP Eber Gonzalez MD Endoscopist Tyrel Thurston MD       Date of procedure: 24    Procedure: EGD w/duodenal and gastric biopsies & Colonoscopy w/random colon biopsies.    Pre-operative diagnosis: altered bowel habits    Post-operative diagnosis: see impressions    Medications: MAC    Colon withdrawal time: 13 minutes    Complications: none    Procedure: Informed consent was obtained from the patient after the risks of the procedure were discussed, including but not limited to bleeding, perforation, aspiration, infection, or possibility of a missed lesion. We discussed the risks/benefits and alternatives to this procedure, as well as the planned sedation. EGD procedure: The patient was placed in the left lateral decubitus position and begun on continuous blood pressure pulse oximetry and EKG monitoring and this was maintained throughout the procedure. Once an adequate level of sedation was obtained a bite block was placed. Then the lubricated tip of the [id number] diagnostic video upper endoscope was inserted and advanced using direct visualization into the posterior pharynx and ultimately into the esophagus with  distal extent of the second portion of the duodenum.     Colonoscopy procedure: Once an adequate level of sedation was obtained a digital rectal exam was completed. Then the lubricated tip of the Pediatric [id number] diagnostic video colonoscope was inserted and advanced without difficulty to the cecum using the CO2 insufflation technique. The cecum was identified by localizing the trifold, the appendix and the ileocecal valve. A routine second examination of the cecum/ascending colon was performed. Withdrawal was begun with thorough washing and careful examination of the colonic walls and folds. Photodocumentation was obtained. The bowel prep was good. Views of the colon were  good with washing. I then carefully withdrew the instrument from the patient who tolerated the procedure well.     Complications: None    EGD findings:      1. Esophagus: The squamocolumnar junction was noted at 36 cm and appeared regular. There is a history of nissen fundoplication. The wrap from prior fundoplication was intact and rather tight around the gastroscope. It has slipped approximately 2-3 cm below the squamocolumnar junction. There was no evidence esophagitis, stricture or endoscopic evidence of Christiansen's esophagus.  2. Stomach: The stomach distended normally. Normal rugal folds were seen. The pylorus was patent. Retroflexion revealed a normal fundus. The gastric mucosa appeared mildly erythematous.. Biopsies were taken with a cold forceps from the antrum, incisura and body for histology.   3. Duodenum: The duodenal mucosa appeared normal in the bulb and 2nd portion of the duodenum. Biopsies were taken with a cold forceps for histology.    EGD Impression:  -Esophagus: No esophagitis. No stricture.  -Stomach: Mild gastritis. Biopsied.  -Duodenum: Normal. Biopsied.  -Nothing seen here to explain iron deficiency/altered bowel habits.    Colonoscopy findings:    1. No polyps  2. Diverticulosis: small diverticula in the left colon noted.  3. Ileocecal valve appeared normal. Terminal ileum was intubated and appeared normal.  4. The colonic mucosa throughout the colon showed normal vascular pattern, without evidence of angioectasias or inflammation. Biopsies were taken throughout with a cold forceps for histology.  5. A retroflexed view of the rectum revealed small internal hemorrhoids.  6. CHARLES: normal rectal tone, no masses palpated.     Colonoscopy Impression:  No polyps.  Normal terminal ileum.  Internal hemorrhoids.  Colon was otherwise normal with glistening mucosa and intact vascular pattern throughout. Biopsied.  Nothing seen on this exam to explain symptoms.    Recommend:  Await pathology.   High fiber  diet.  Monitor for blood in the stool. If having more than just tinge of blood, call office or go to the ER.  Will discuss laxative agent once biopsy results return.    >>>If tissue was obtained and you have not received your pathology results either by phone or letter within 2 weeks, please call our office at [phone].    Specimens: colon, duodenum, gastric.    Blood loss: <1 ml

## 2024-03-11 NOTE — ANESTHESIA POSTPROCEDURE EVALUATION
Patient: Vianey Cantu    Procedure Summary       Date: 03/11/24 Room / Location: [facility] ENDOSCOPY 02 / [facility] ENDO    Anesthesia Start: 0808 Anesthesia Stop: 0847    Procedures:       COLONOSCOPY / ESOPHAGOGASTRODUODENOSCOPY      ESOPHAGOGASTRODUODENOSCOPY (EGD) Diagnosis:       Diarrhea, unspecified type      Iron deficiency anemia, unspecified iron deficiency anemia type      (hemorrhoids and gastririts)    Surgeons: Tyrel Thurston MD Anesthesiologist: Fly Green DO    Anesthesia Type: MAC ASA Status: 2            Anesthesia Type: MAC    Vitals Value Taken Time   /88 03/11/24 0846   Temp  03/11/24 0848   Pulse 66 03/11/24 0846   Resp 16 03/11/24 0846   SpO2 100 % 03/11/24 0846       [facility] AN Post Evaluation:   Patient Evaluated in PACU  Patient Participation: complete - patient participated  Level of Consciousness: awake  Pain Management: adequate  Airway Patency:patent  Dental exam unchanged from preop  Yes    Nausea/Vomiting: none  Cardiovascular Status: acceptable  Respiratory Status: acceptable  Postoperative Hydration acceptable      FLY GREEN DO  3/11/2024 8:48 AM

## 2024-03-11 NOTE — H&P
History & Physical Examination    Patient Name: Vianey Cantu  MRN: [id number]  [facility]: [id number]  YOB: 1975    Diagnosis: Diarrhea, abdominal bloating, iron deficiency, +FIT.    Colonoscopy and EGD today.    Medications Prior to Admission   Medication Sig Dispense Refill Last Dose    metoprolol succinate ER 25 MG Oral Tablet 24 Hr Take 0.5 tablets (12.5 mg total) by mouth daily. 45 tablet 3 3/10/2024 at 2230    simvastatin 20 MG Oral Tab Take 1 tablet (20 mg total) by mouth nightly. 90 tablet 3 3/10/2024 at 2230    ibuprofen 800 MG Oral Tab Take 1 tablet (800 mg total) by mouth 2 (two) times daily as needed for Pain. 30 tablet 3  at PRN    oxybutynin ER 10 MG Oral Tablet 24 Hr Take 1 tablet (10 mg total) by mouth daily. 90 tablet 3 3/10/2024 at 2230    FLUoxetine 20 MG Oral Cap Take 1 capsule (20 mg total) by mouth daily. 90 capsule 1 3/11/2024 at 0630    polyethylene glycol, PEG 3350-KCl-NaBcb-NaCl-NaSulf, 236 g Oral Recon Soln Take 4,000 mL by mouth As Directed. Take 2,000 mL the night before your procedure and 2,000 mL the morning of your procedure. 1 each 0     [] naproxen 500 MG Oral Tab Take 1 tablet (500 mg total) by mouth 2 (two) times daily as needed. 14 tablet 0     [] naproxen 500 MG Oral Tab Take 1 tablet (500 mg total) by mouth 2 (two) times daily as needed. 14 tablet 0     Omeprazole 40 MG Oral Capsule Delayed Release Take 1 capsule (40 mg total) by mouth daily. 90 capsule 3 3/11/2024 at 0630     No current facility-administered medications for this encounter.       Allergies: No Known Allergies    Past Medical History:   Diagnosis Date    Abdominal pain in female     LLQ. Colonoscopy : NL (10-14-15) except hemorrhoids.     Abnormal Pap smear of cervix     CHAYITO 1    Amenorrhea     Anemia     Anxiety     Cyst of buttocks     cyst on left buttocks, removed    Cyst of ovary, right     Decorative tattoo     Elevated liver enzymes     Fatty liver.     Esophageal reflux     S/P  Lap. Nissen fundoplication.    Hiatal hernia     High blood pressure     High cholesterol     Incontinence     Umbilical hernia      Past Surgical History:   Procedure Laterality Date    BRAVO 96HR - INTERNAL  2013    [place] 30 1st 48, 16 2nd 48          x 3      2006    CHOLECYSTECTOMY      COLONOSCOPY  2020    COLONOSCOPY      EGD  2013    EGD  2020    ESOPHAGEAL MANOMETRY - INTERNAL  2013    normal    EXCIS PRIMARY GANGLION WRIST Left     wrist x2    FOREARM/WRIST SURGERY UNLISTED Left     wrist tendon surgery    HEMORRHOIDECTOMY  2015    internal and anal skin tag removeal. Benign.     HERNIA SURGERY      LAPAROSCOPIC FUNDOPLASTY  2013    hiatal hernia     BONNIE LOCALIZATION WIRE 1 SITE LEFT (CPT=19281) Left 2017    sebaceous cyst    OTHER SURGICAL HISTORY      Lasik    OTHER SURGICAL HISTORY Left     ankle tendon repair    TUBAL LIGATION       Family History   Problem Relation Age of Onset    Cancer Mother 54        breast cancer     Breast Cancer Mother 54    Cancer Maternal Uncle         melanoma    Diabetes Maternal Grandmother     Stroke Maternal Grandfather     Lipids Father         hyperlipidemia    Diabetes Father     Other (NSVT) Father     Cancer Father         Liver cancer    Bleeding Disorders Neg     Clotting Disorder Neg      Social History     Tobacco Use    Smoking status: Never    Smokeless tobacco: Never   Substance Use Topics    Alcohol use: No         SYSTEM Check if Review is Normal Check if Physical Exam is Normal If not normal, please explain:   HEENT [X ] [ X]    NECK  [X ] [ X]    HEART [X ] [ X]    LUNGS [X ] [ X]    ABDOMEN [X ] [ X]    EXTREMITIES [X ] [ X]    OTHER        I have discussed the risks and benefits and alternatives of the procedure with the patient/family.  They understand and agree to proceed with plan of care.   I have reviewed the History and Physical done within the last 30 days.  Any changes noted  above.    Tyrel Thurston MD  [facility] Gastroenter[facility]

## 2024-03-11 NOTE — DISCHARGE INSTRUCTIONS
Home Care Instructions for Colonoscopy and/or Gastroscopy with Sedation    Diet:  - Resume your regular diet as tolerated unless otherwise instructed.  - Start with light meals to minimize bloating.  - Do not drink alcohol today.    Medication:  - If you have questions about resuming your normal medications, please contact your Primary Care Physician.    Activities:  - Take it easy today. Do not return to work today.  - Do not drive today.  - Do not operate any machinery today (including kitchen equipment).    Colonoscopy:  - You may notice some rectal \"spotting\" (a little blood on the toilet tissue) for a day or two after the exam. This is normal.  - If you experience any rectal bleeding (not spotting), persistent tenderness or sharp severe abdominal pains, oral temperature over 100 degrees Fahrenheit, light-headedness or dizziness, or any other problems, contact your doctor.    Gastroscopy:  - You may have a sore throat for 2-3 days following the exam. This is normal. Gargling with warm salt water (1/2 tsp salt to 1 glass warm water) or using throat lozenges will help.  - If you experience any sharp pain in your neck, abdomen or chest, vomiting of blood, oral temperature over 100 degrees Fahrenheit, light-headedness or dizziness, or any other problems, contact your doctor.    **If unable to reach your doctor, please go to the [facility] Emergency Room**    - Your referring physician will receive a full report of your examination.  - If you do not hear from your doctor's office within two weeks of your biopsy, please call them for your results.    You may be able to see your laboratory results in [employer] between 4 and 7 business days.  In some cases, your physician may not have viewed the results before they are released to [employer].  If you have questions regarding your results contact the physician who ordered the test/exam by phone or via [employer] by choosing \"Ask a Medical Question.\"

## 2024-03-14 ENCOUNTER — TELEPHONE (OUTPATIENT)
Facility: CLINIC | Age: 49
End: 2024-03-14

## 2024-03-14 DIAGNOSIS — B96.81 HELICOBACTER PYLORI GASTRITIS: Primary | ICD-10-CM

## 2024-03-14 DIAGNOSIS — K29.70 HELICOBACTER PYLORI GASTRITIS: Primary | ICD-10-CM

## 2024-03-14 RX ORDER — AMOXICILLIN 500 MG/1
1000 TABLET, FILM COATED ORAL 2 TIMES DAILY
Qty: 56 TABLET | Refills: 0 | Status: SHIPPED | OUTPATIENT
Start: 2024-03-14 | End: 2024-03-28

## 2024-03-14 RX ORDER — OMEPRAZOLE 20 MG/1
20 CAPSULE, DELAYED RELEASE ORAL
Qty: 28 CAPSULE | Refills: 0 | Status: SHIPPED | OUTPATIENT
Start: 2024-03-14 | End: 2024-03-28

## 2024-03-14 RX ORDER — CLARITHROMYCIN 500 MG/1
500 TABLET, COATED ORAL 2 TIMES DAILY
Qty: 28 TABLET | Refills: 0 | Status: SHIPPED | OUTPATIENT
Start: 2024-03-14 | End: 2024-03-28

## 2024-03-14 NOTE — TELEPHONE ENCOUNTER
Pt called regarding colonoscopy results and also about medication that Dr. Thurston told her was going to send based on biopsy results.  Please call before 11am or leave detailed voicemail.

## 2024-03-14 NOTE — TELEPHONE ENCOUNTER
I called the patient, left a detailed voicemail regarding h. Pylori.    Will start with triple therapy -- amoxicillin, clarithromycin, omeprazole. She should hold simvastatin while on this regimen.    Please recall for h. Pylori in 10 weeks.    I let her know about side effects of the medications and to hold simvastatin while on the above regimen.

## 2024-03-29 ENCOUNTER — OFFICE VISIT (OUTPATIENT)
Dept: HEMATOLOGY/ONCOLOGY | Facility: HOSPITAL | Age: 49
End: 2024-03-29
Attending: INTERNAL MEDICINE
Payer: COMMERCIAL

## 2024-03-29 VITALS
HEART RATE: 68 BPM | SYSTOLIC BLOOD PRESSURE: 144 MMHG | WEIGHT: 178.5 LBS | BODY MASS INDEX: 34 KG/M2 | RESPIRATION RATE: 16 BRPM | DIASTOLIC BLOOD PRESSURE: 82 MMHG | OXYGEN SATURATION: 95 % | TEMPERATURE: 98 F

## 2024-03-29 DIAGNOSIS — E61.1 IRON DEFICIENCY: Primary | ICD-10-CM

## 2024-03-29 PROCEDURE — 96374 THER/PROPH/DIAG INJ IV PUSH: CPT

## 2024-04-02 ENCOUNTER — OFFICE VISIT (OUTPATIENT)
Dept: OBGYN CLINIC | Facility: CLINIC | Age: 49
End: 2024-04-02
Payer: COMMERCIAL

## 2024-04-02 VITALS
BODY MASS INDEX: 33.61 KG/M2 | HEART RATE: 74 BPM | SYSTOLIC BLOOD PRESSURE: 132 MMHG | DIASTOLIC BLOOD PRESSURE: 75 MMHG | HEIGHT: 61 IN | WEIGHT: 178 LBS

## 2024-04-02 DIAGNOSIS — Z01.419 ENCOUNTER FOR WELL WOMAN EXAM WITH ROUTINE GYNECOLOGICAL EXAM: Primary | ICD-10-CM

## 2024-04-02 PROCEDURE — 99396 PREV VISIT EST AGE 40-64: CPT | Performed by: OBSTETRICS & GYNECOLOGY

## 2024-04-02 PROCEDURE — 3075F SYST BP GE 130 - 139MM HG: CPT | Performed by: OBSTETRICS & GYNECOLOGY

## 2024-04-02 PROCEDURE — 3078F DIAST BP <80 MM HG: CPT | Performed by: OBSTETRICS & GYNECOLOGY

## 2024-04-02 PROCEDURE — 3008F BODY MASS INDEX DOCD: CPT | Performed by: OBSTETRICS & GYNECOLOGY

## 2024-04-02 RX ORDER — OMEPRAZOLE 40 MG/1
40 CAPSULE, DELAYED RELEASE ORAL DAILY
COMMUNITY
Start: 2024-04-01

## 2024-04-02 RX ORDER — AMOXICILLIN 500 MG/1
2 CAPSULE ORAL 2 TIMES DAILY
COMMUNITY
Start: 2024-03-14

## 2024-04-02 NOTE — PROGRESS NOTES
HPI:   Vianey Cantu is a 48 year old female who presents for an annual/pap    Wt Readings from Last 6 Encounters:   04/02/24 178 lb (80.7 kg)   03/29/24 178 lb 8 oz (81 kg)   03/07/24 179 lb (81.2 kg)   03/05/24 179 lb (81.2 kg)   02/23/24 176 lb 4.8 oz (80 kg)   02/09/24 179 lb (81.2 kg)     Body mass index is 33.63 kg/m².     Cholesterol, Total (mg/dL)   Date Value   06/27/2023 160   07/23/2022 113   01/22/2022 112     HDL Cholesterol (mg/dL)   Date Value   06/27/2023 33 (L)   07/23/2022 42   01/22/2022 37 (L)     LDL Cholesterol (mg/dL)   Date Value   06/27/2023 99   07/23/2022 58   01/22/2022 60     AST (U/L)   Date Value   02/23/2024 15   11/18/2023 17   06/27/2023 14 (L)   08/27/2013 30     ALT (U/L)   Date Value   02/23/2024 19   11/18/2023 19   06/27/2023 25   08/27/2013 49 (H)        Current Outpatient Medications   Medication Sig Dispense Refill    amoxicillin 500 MG Oral Cap Take 2 mg by mouth 2 (two) times daily.      Omeprazole 40 MG Oral Capsule Delayed Release Take 1 capsule (40 mg total) by mouth daily.      metoprolol succinate ER 25 MG Oral Tablet 24 Hr Take 0.5 tablets (12.5 mg total) by mouth daily. 45 tablet 3    oxybutynin ER 10 MG Oral Tablet 24 Hr Take 1 tablet (10 mg total) by mouth daily. 90 tablet 3    FLUoxetine 20 MG Oral Cap Take 1 capsule (20 mg total) by mouth daily. 90 capsule 1    simvastatin 20 MG Oral Tab Take 1 tablet (20 mg total) by mouth nightly. (Patient not taking: Reported on 4/2/2024) 90 tablet 3    ibuprofen 800 MG Oral Tab Take 1 tablet (800 mg total) by mouth 2 (two) times daily as needed for Pain. (Patient not taking: Reported on 4/2/2024) 30 tablet 3      Past Medical History:   Diagnosis Date    Abdominal pain in female     LLQ. Colonoscopy : NL (10-14-15) except hemorrhoids.     Abnormal Pap smear of cervix     CHAYITO 1    Amenorrhea     Anemia     Anxiety     Cyst of buttocks     cyst on left buttocks, removed    Cyst of ovary, right     Decorative tattoo     Elevated  liver enzymes     Fatty liver.     Esophageal reflux     S/P Lap. Nissen fundoplication.    Hiatal hernia     High blood pressure     High cholesterol     Incontinence     Umbilical hernia       Past Surgical History:   Procedure Laterality Date    BRAVO 96HR - INTERNAL  2013    [place] 30 1st 48, 16 2nd 48          x 3      2006    CHOLECYSTECTOMY      COLONOSCOPY  2020    COLONOSCOPY      COLONOSCOPY      COLONOSCOPY N/A 3/11/2024    Procedure: COLONOSCOPY;  Surgeon: Tyrel Thurston MD;  [facility]    EGD  2013    EGD  2020    EGD      ESOPHAGEAL MANOMETRY - INTERNAL  2013    normal    EXCIS PRIMARY GANGLION WRIST Left     wrist x2    FOREARM/WRIST SURGERY UNLISTED Left     wrist tendon surgery    HEMORRHOIDECTOMY  2015    internal and anal skin tag removeal. Benign.     HERNIA SURGERY      LAPAROSCOPIC FUNDOPLASTY  2013    hiatal hernia     BONNIE LOCALIZATION WIRE 1 SITE LEFT (CPT=19281) Left 2017    sebaceous cyst    OTHER SURGICAL HISTORY      Lasik    OTHER SURGICAL HISTORY Left     ankle tendon repair    TUBAL LIGATION        Family History   Problem Relation Age of Onset    Cancer Mother 54        breast cancer     Breast Cancer Mother 54    Cancer Maternal Uncle         melanoma    Diabetes Maternal Grandmother     Stroke Maternal Grandfather     Lipids Father         hyperlipidemia    Diabetes Father     Other (NSVT) Father     Cancer Father         Liver cancer    Bleeding Disorders Neg     Clotting Disorder Neg       Social History:   Social History     Socioeconomic History    Marital status:    Occupational History    Occupation: Retired. Was dental ass't.    Tobacco Use    Smoking status: Never    Smokeless tobacco: Never   Vaping Use    Vaping Use: Never used   Substance and Sexual Activity    Alcohol use: No    Drug use: Never    Sexual activity: Yes     Partners: Male     Birth control/protection: Tubal  Ligation   Other Topics Concern    Caffeine Concern No    Exercise Yes     Comment: walks for 20 minutes, 3 times per week.   Social History Narrative    Noreen is  x 20yrs. She has 3 children. 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 Noreen lives with her family in [place], [place].             REVIEW OF SYSTEMS:   GENERAL: feels well otherwise  SKIN: denies any unusual skin lesions  EYES:denies blurred vision or double vision  HEENT: denies nasal congestion, sinus pain or ST  LUNGS: denies shortness of breath with exertion  CARDIOVASCULAR: denies chest pain on exertion  GI: denies abdominal pain,denies heartburn  : denies dysuria, vaginal discharge or itching,periods regular   MUSCULOSKELETAL: denies back pain  NEURO: denies headaches  PSYCHE: denies depression or anxiety  HEMATOLOGIC: denies hx of anemia  ENDOCRINE: denies thyroid history  ALL/ASTHMA: denies hx of allergy or asthma    EXAM:   /75   Pulse 74   Ht 5' 1\" (1.549 m)   Wt 178 lb (80.7 kg)   LMP 03/16/2024 (Exact Date)   BMI 33.63 kg/m²   Body mass index is 33.63 kg/m².   GENERAL: well developed, well nourished,in no apparent distress  SKIN: no rashes,no suspicious lesions  HEENT: atraumatic, normocephalic  EYES:normal in appearance  NECK: supple,no adenopathy  CHEST: no chest tenderness  BREAST: no dominant or suspicious mass  LUNGS: clear to auscultation  CARDIO: RRR without murmur  GI: good BS's,no masses, HSM or tenderness  :introitus is normal,scant discharge,cervix is pink,no adnexal masses or tenderness, PAP was done     MUSCULOSKELETAL: back is not tender,FROM of the back  EXTREMITIES: no cyanosis, clubbing or edema  NEURO: Oriented times three      ASSESSMENT AND PLAN:   Vianey Cantu is a 48 year old female who presents for an annual/pap  . Self breast exam explained. Health maintenance. Body mass index is 33.63 kg/m²., recommended low fat diet and aerobic exercise 30 minutes three times weekly.  The patient indicates understanding  of these issues and agrees to the plan.  The patient is asked to return for an annual visit.

## 2024-04-03 LAB — HPV I/H RISK 1 DNA SPEC QL NAA+PROBE: NEGATIVE

## 2024-04-05 ENCOUNTER — OFFICE VISIT (OUTPATIENT)
Dept: HEMATOLOGY/ONCOLOGY | Facility: HOSPITAL | Age: 49
End: 2024-04-05
Attending: INTERNAL MEDICINE
Payer: COMMERCIAL

## 2024-04-05 VITALS
TEMPERATURE: 99 F | RESPIRATION RATE: 16 BRPM | DIASTOLIC BLOOD PRESSURE: 62 MMHG | HEART RATE: 69 BPM | BODY MASS INDEX: 34 KG/M2 | OXYGEN SATURATION: 98 % | WEIGHT: 179.88 LBS | SYSTOLIC BLOOD PRESSURE: 131 MMHG

## 2024-04-05 DIAGNOSIS — E61.1 IRON DEFICIENCY: Primary | ICD-10-CM

## 2024-04-05 PROCEDURE — 96374 THER/PROPH/DIAG INJ IV PUSH: CPT

## 2024-04-05 NOTE — PROGRESS NOTES
Noreen to infusion today for 200mg Venofer  2 of 3. She arrives alert and independent and offers no complaints today          PIV placed  good blood return noted.  Venofer given slow IVP over 5 minutes with blood return noted throughout.      Patient tolerated well, no s/s of adverse reaction noted or reported.PIV flushed and removed, applied 2x2 gauze and coban to site. Patient discharged in stable condition from infusion.  She states that she will check [facility] for her appointments.

## 2024-04-11 NOTE — TELEPHONE ENCOUNTER
I spoke to the pt    She is just finishing her antibiotics for h pylori    I placed a recall to have her repeat the h pylori breath test at the end of May    She will stop her omeprazole around 05/15/2024, 2 weeks prior to testing    She is aware I will call her at the end of May

## 2024-04-12 ENCOUNTER — APPOINTMENT (OUTPATIENT)
Dept: HEMATOLOGY/ONCOLOGY | Facility: HOSPITAL | Age: 49
End: 2024-04-12
Attending: INTERNAL MEDICINE
Payer: COMMERCIAL

## 2024-04-16 ENCOUNTER — MED REC SCAN ONLY (OUTPATIENT)
Dept: INTERNAL MEDICINE CLINIC | Facility: CLINIC | Age: 49
End: 2024-04-16

## 2024-04-16 ENCOUNTER — TELEPHONE (OUTPATIENT)
Facility: CLINIC | Age: 49
End: 2024-04-16

## 2024-04-16 NOTE — TELEPHONE ENCOUNTER
1st reminder letter sent out via mail and [employer]UP for the following:   Helicobacter Pylori Breath Test, Adult (Order #[id number]) on 3/14/24

## 2024-04-19 ENCOUNTER — OFFICE VISIT (OUTPATIENT)
Dept: HEMATOLOGY/ONCOLOGY | Facility: HOSPITAL | Age: 49
End: 2024-04-19
Attending: INTERNAL MEDICINE
Payer: COMMERCIAL

## 2024-04-19 VITALS
OXYGEN SATURATION: 99 % | SYSTOLIC BLOOD PRESSURE: 118 MMHG | TEMPERATURE: 98 F | BODY MASS INDEX: 34 KG/M2 | HEART RATE: 52 BPM | RESPIRATION RATE: 16 BRPM | WEIGHT: 177.81 LBS | DIASTOLIC BLOOD PRESSURE: 66 MMHG

## 2024-04-19 DIAGNOSIS — E61.1 IRON DEFICIENCY: Primary | ICD-10-CM

## 2024-04-19 PROCEDURE — 96374 THER/PROPH/DIAG INJ IV PUSH: CPT

## 2024-04-19 NOTE — PROGRESS NOTES
Pt here for Venofer 200 3/3.  Feels well. Tolerated last iron infusions without incident . Pt denies any issues or concerns. PIV started with good blood return-dose given via free flowing IV.  Tolerated well.  PIV dc'd.  Dc'd ambulating.  Walker RN to enter standing iron orders.     Ordering MD: Dmitriy  Order Exp: completion of 3/3     Pt tolerated infusion without difficulty or complaint. Reviewed next apt date/time: yes      Education Record  Learner:  Patient  Disease / Diagnosis: anemia  Barriers / Limitations:  None  Method:  Discussion  General Topics:  Side effects and symptom management and Plan of care reviewed  Outcome:  Shows understanding

## 2024-05-15 ENCOUNTER — TELEPHONE (OUTPATIENT)
Facility: CLINIC | Age: 49
End: 2024-05-15

## 2024-05-15 NOTE — TELEPHONE ENCOUNTER
Patient has questions about where she needs to schedule breath test. Please contact patient at [phone]. Patient request to call before 10:00 am due to her work schedule, ok to leave message, thanks.

## 2024-05-15 NOTE — TELEPHONE ENCOUNTER
Patient returning RN's call from earlier today.  She states that she will be testing in 2 weeks and she has stopped the medication yesterday 5/14/24.  See 5/15/24 signed telephone encounter.

## 2024-05-15 NOTE — TELEPHONE ENCOUNTER
I left a detailed message on [employer] that she can go to the lab to complete the breath test    Reminded her she has to be off her PPI for 2 weeks prior to testing     No food or drink for one hour prior to testing    If followed instructions she may proceed    Told her to call me back with any further questions

## 2024-05-17 ENCOUNTER — LAB ENCOUNTER (OUTPATIENT)
Dept: LAB | Facility: HOSPITAL | Age: 49
End: 2024-05-17
Attending: INTERNAL MEDICINE

## 2024-05-17 ENCOUNTER — OFFICE VISIT (OUTPATIENT)
Dept: HEMATOLOGY/ONCOLOGY | Facility: HOSPITAL | Age: 49
End: 2024-05-17
Attending: INTERNAL MEDICINE

## 2024-05-17 VITALS
BODY MASS INDEX: 34.14 KG/M2 | WEIGHT: 180.81 LBS | OXYGEN SATURATION: 100 % | HEIGHT: 61 IN | SYSTOLIC BLOOD PRESSURE: 119 MMHG | RESPIRATION RATE: 16 BRPM | TEMPERATURE: 97 F | HEART RATE: 59 BPM | DIASTOLIC BLOOD PRESSURE: 64 MMHG

## 2024-05-17 DIAGNOSIS — A04.8 H. PYLORI INFECTION: ICD-10-CM

## 2024-05-17 DIAGNOSIS — E61.1 IRON DEFICIENCY: Primary | ICD-10-CM

## 2024-05-17 DIAGNOSIS — E61.1 IRON DEFICIENCY: ICD-10-CM

## 2024-05-17 LAB
BASOPHILS # BLD AUTO: 0.04 X10(3) UL (ref 0–0.2)
BASOPHILS NFR BLD AUTO: 0.6 %
DEPRECATED HBV CORE AB SER IA-ACNC: 17.8 NG/ML
DEPRECATED RDW RBC AUTO: 61.2 FL (ref 35.1–46.3)
EOSINOPHIL # BLD AUTO: 0.05 X10(3) UL (ref 0–0.7)
EOSINOPHIL NFR BLD AUTO: 0.8 %
ERYTHROCYTE [DISTWIDTH] IN BLOOD BY AUTOMATED COUNT: 22.8 % (ref 11–15)
HCT VFR BLD AUTO: 41.6 %
HGB BLD-MCNC: 12.3 G/DL
IMM GRANULOCYTES # BLD AUTO: 0.04 X10(3) UL (ref 0–1)
IMM GRANULOCYTES NFR BLD: 0.6 %
IRON SATN MFR SERPL: 10 %
IRON SERPL-MCNC: 45 UG/DL
LYMPHOCYTES # BLD AUTO: 1.86 X10(3) UL (ref 1–4)
LYMPHOCYTES NFR BLD AUTO: 30 %
MCH RBC QN AUTO: 22.4 PG (ref 26–34)
MCHC RBC AUTO-ENTMCNC: 29.6 G/DL (ref 31–37)
MCV RBC AUTO: 75.8 FL
MONOCYTES # BLD AUTO: 0.41 X10(3) UL (ref 0.1–1)
MONOCYTES NFR BLD AUTO: 6.6 %
NEUTROPHILS # BLD AUTO: 3.81 X10 (3) UL (ref 1.5–7.7)
NEUTROPHILS # BLD AUTO: 3.81 X10(3) UL (ref 1.5–7.7)
NEUTROPHILS NFR BLD AUTO: 61.4 %
PLATELET # BLD AUTO: 300 10(3)UL (ref 150–450)
PLATELET MORPHOLOGY: NORMAL
RBC # BLD AUTO: 5.49 X10(6)UL
TIBC SERPL-MCNC: 453 UG/DL (ref 250–425)
TRANSFERRIN SERPL-MCNC: 304 MG/DL (ref 250–380)
WBC # BLD AUTO: 6.2 X10(3) UL (ref 4–11)

## 2024-05-17 PROCEDURE — 82728 ASSAY OF FERRITIN: CPT

## 2024-05-17 PROCEDURE — 84466 ASSAY OF TRANSFERRIN: CPT

## 2024-05-17 PROCEDURE — 85025 COMPLETE CBC W/AUTO DIFF WBC: CPT

## 2024-05-17 PROCEDURE — 83540 ASSAY OF IRON: CPT

## 2024-05-17 PROCEDURE — 36415 COLL VENOUS BLD VENIPUNCTURE: CPT

## 2024-05-17 PROCEDURE — 99214 OFFICE O/P EST MOD 30 MIN: CPT | Performed by: INTERNAL MEDICINE

## 2024-05-17 RX ORDER — FERROUS SULFATE 325(65) MG
325 TABLET ORAL
Qty: 30 TABLET | Refills: 2 | Status: SHIPPED | OUTPATIENT
Start: 2024-05-17

## 2024-05-17 NOTE — PROGRESS NOTES
Hematology Progress Note    Patient Name: Vianey Cantu   YOB: 1975   Medical Record Number: [id number]   CSN: [id number]   Consulting Physician: Darrion Izaguirre MD  Referring Physician(s): Eber Gonzalez MD  Date of Visit: 5/17/2024    Chief complaint:  Iron deficiency anemia    History of Present Illness:     Vianey Cantu is a 48 year old female that was seen today in the hematology suite for evaluation of the above condition. Patient with PMH relevant for anxiety that causes sweating day and night presenting for evaluation of lowered iron levels but no signs of anemia.  Patient reports menstrual blood loss is her only site of bleeding.  She reports her cycles last once a month, not associated with heavy flow. Noreen denies any epistaxis, oral pharyngeal bleeding, melena, hematochezia, hematuria.  This patient does not have symptomatic anemia as she denies symptoms of fatigue, chest pain, dyspnea, lightheadedness or dizziness.  Her review of systems is notable for swelling during the day at night as well as alternating constipation with diarrhea.  Patient mentions that oral iron has caused constipation in the past and she did not like the taste of the pills. ROS is otherwise negative.    Interval History:  The patient returns for planned follow-up based on hx of iron deficiency anemia which has returned. Pt no longer has leukocytosis. Patient underwent a GI eval in 3/24 and found to have H.Pylori infection as she was having frequent loose stools and recent tests showed fecal occult positive stool. Her path was negative for malignancy. She reports being tx with triple therapy for H. Pylori. Noreen reports that her menstrual cycle blood loss is minimal. She denies blood loss from any other orifice. Review of systems is negative for any new concerns.       Past Medical History:  Past Medical History:    Abdominal pain in female    LLQ. Colonoscopy : NL (10-14-15) except hemorrhoids.     Abnormal Pap smear  of cervix    CHAYITO 1    Amenorrhea    Anemia    Anxiety    Cyst of buttocks    cyst on left buttocks, removed    Cyst of ovary, right    Decorative tattoo    Elevated liver enzymes    Fatty liver.     Esophageal reflux    S/P Lap. Nissen fundoplication.    Hiatal hernia    High blood pressure    High cholesterol    Incontinence    Umbilical hernia       Past Surgical History:  Past Surgical History:   Procedure Laterality Date    Bravo 96hr - internal  2013    [place] 30 1st 48, 16 2nd 48          x 3      2006    Cholecystectomy      Colonoscopy  2020    Colonoscopy      Colonoscopy      Colonoscopy N/A 3/11/2024    Procedure: COLONOSCOPY;  Surgeon: Tyrel Thurston MD;  Location: [facility] ENDO    Egd  2013    Egd  2020    Egd      Esophageal manometry - internal  2013    normal    Excis primary ganglion wrist Left     wrist x2    Forearm/wrist surgery unlisted Left     wrist tendon surgery    Hemorrhoidectomy  2015    internal and anal skin tag removeal. Benign.     Hernia surgery      Laparoscopic fundoplasty  2013    hiatal hernia     Daniel localization wire 1 site left (cpt=19281) Left 2017    sebaceous cyst    Other surgical history      Lasik    Other surgical history Left     ankle tendon repair    Tubal ligation         Family Medical History:  Family History   Problem Relation Age of Onset    Cancer Mother 54        breast cancer     Breast Cancer Mother 54    Cancer Maternal Uncle         melanoma    Diabetes Maternal Grandmother     Stroke Maternal Grandfather     Lipids Father         hyperlipidemia    Diabetes Father     Other (NSVT) Father     Cancer Father         Liver cancer    Bleeding Disorders Neg     Clotting Disorder Neg        Social History:  Social History     Socioeconomic History    Marital status:      Spouse name: Not on file    Number of children: Not on file    Years of education: Not on file    Highest education  level: Not on file   Occupational History    Occupation: Retired. Was dental ass't.    Tobacco Use    Smoking status: Never    Smokeless tobacco: Never   Vaping Use    Vaping status: Never Used   Substance and Sexual Activity    Alcohol use: No    Drug use: Never    Sexual activity: Yes     Partners: Male     Birth control/protection: Tubal Ligation   Other Topics Concern     Service Not Asked    Blood Transfusions Not Asked    Caffeine Concern No    Occupational Exposure Not Asked    Hobby Hazards Not Asked    Sleep Concern Not Asked    Stress Concern Not Asked    Weight Concern Not Asked    Special Diet Not Asked    Back Care Not Asked    Exercise Yes     Comment: walks for 20 minutes, 3 times per week.    Bike Helmet Not Asked    Seat Belt Not Asked    Self-Exams Not Asked   Social History Narrative    Noreen is  x 18yrs. She has 3 children. 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 Noreen lives with her family in [place], [place].      Social Determinants of Health     Financial Resource Strain: Not on file   Food Insecurity: Not on file   Transportation Needs: Not on file   Physical Activity: Not on file   Stress: Not on file   Social Connections: Not on file   Housing Stability: Not on file       Allergies:   No Known Allergies    Current Medications:   Ferrous Sulfate 325 (65 Fe) MG Oral Tab Take 1 tablet (325 mg total) by mouth daily with food. 30 tablet 2    amoxicillin 500 MG Oral Cap Take 2 mg by mouth 2 (two) times daily.      metoprolol succinate ER 25 MG Oral Tablet 24 Hr Take 0.5 tablets (12.5 mg total) by mouth daily. 45 tablet 3    simvastatin 20 MG Oral Tab Take 1 tablet (20 mg total) by mouth nightly. 90 tablet 3    ibuprofen 800 MG Oral Tab Take 1 tablet (800 mg total) by mouth 2 (two) times daily as needed for Pain. 30 tablet 3    oxybutynin ER 10 MG Oral Tablet 24 Hr Take 1 tablet (10 mg total) by mouth daily. 90 tablet 3    FLUoxetine 20 MG Oral Cap Take 1 capsule (20 mg total) by mouth  daily. 90 capsule 1       Review of Systems:    Constitutional: Negative for anorexia, chills, fevers, negative intermittent night sweats, decreased appetite, or fatigue  Eyes: Negative for visual disturbance, irritation and redness.  Respiratory: Negative for cough, hemoptysis, chest pain, or dyspnea on exertion.  Gastrointestinal: Negative for dysphagia, odynophagia, reflux symptoms, nausea, vomiting, change in bowel habits, and +abdominal pain   Integument/breast: Negative for rash, skin lesions, and pruritus.  Hematologic/lymphatic: Negative for easy bruising, bleeding, and lymphadenopathy.  Musculoskeletal: Negative for myalgias, arthralgias, muscle weakness.  Neurological: Negative for headaches, dizziness, speech problems, gait problems and weakness.    A comprehensive 14 point review of systems was completed.  Pertinent positives and negatives noted in the the HPI.     Vital Signs:  /64 (BP Location: Left arm, Patient Position: Sitting, Cuff Size: adult)   Pulse 59   Temp 97.4 °F (36.3 °C) (Oral)   Resp 16   Ht 1.549 m (5' 1\")   Wt 82 kg (180 lb 12.8 oz)   LMP 03/16/2024 (Exact Date)   SpO2 100%   BMI 34.16 kg/m²     Physical Examination:    General: Patient is alert and oriented x 3, not in acute distress.  Psych:  Friendly, cooperative with appropriate questions and responses  HEENT: EOMs intact. Oropharynx is clear.   Neck: No palpable lymphadenopathy. Neck is supple.  Lymphatics: There is no palpable lymphadenopathy throughout in the cervical, supraclavicular, axillary, or inguinal regions.  Chest: Clear to auscultation. No wheezes or rales.  Heart: Regular rate and rhythm. S1S2 normal.  Abdomen: Soft, non tender with good bowel sounds.  No hepatosplenomegaly.  No palpable mass.  Extremities: No edema or calf tenderness.  Neurological: Grossly intact.      Labs:    Lab Results   Component Value Date/Time    WBC 6.2 05/17/2024 07:24 AM    RBC 5.49 (H) 05/17/2024 07:24 AM    HGB 12.3  05/17/2024 07:24 AM    HCT 41.6 05/17/2024 07:24 AM    MCV 75.8 (L) 05/17/2024 07:24 AM    MCH 22.4 (L) 05/17/2024 07:24 AM    MCHC 29.6 (L) 05/17/2024 07:24 AM    RDW 22.8 (H) 05/17/2024 07:24 AM    NEPRELIM 3.81 05/17/2024 07:24 AM    .0 05/17/2024 07:24 AM       Lab Results   Component Value Date/Time     (H) 02/23/2024 04:14 PM    BUN 17 02/23/2024 04:14 PM    CREATSERUM 0.72 02/23/2024 04:14 PM    GFRNAA 101 07/23/2022 11:13 AM    CA 9.8 02/23/2024 04:14 PM    ALB 4.8 02/23/2024 04:14 PM     02/23/2024 04:14 PM    K 3.9 02/23/2024 04:14 PM     02/23/2024 04:14 PM    CO2 28.0 02/23/2024 04:14 PM    ALKPHO 75 02/23/2024 04:14 PM    AST 15 02/23/2024 04:14 PM    ALT 19 02/23/2024 04:14 PM      Latest Reference Range & Units 05/17/24 07:24   Iron, Serum 50 - 170 ug/dL 45 (L)   Transferrin 250 - 380 mg/dL 304   Iron Bind.Cap.(TIBC) 250 - 425 ug/dL 453 (H)   Iron Saturation 15 - 50 % 10 (L)   FERRITIN 12.0 - 240.0 ng/mL 17.8   (L): Data is abnormally low  (H): Data is abnormally high      Impression:      ICD-10-CM    1. Iron deficiency  E61.1 Ferrous Sulfate 325 (65 Fe) MG Oral Tab     CBC With Differential With Platelet     Ferritin     Iron And Tibc      2. H. pylori infection  A04.8 CBC With Differential With Platelet     Ferritin     Iron And Tibc          48 year old W with PMH relevant for anxiety that causes sweating day and night presenting for follow up of iron deficiency anemia with leukocytosis, frequent loose stools, abdominal pain and fecal occult test positive results    Plan:    1.) Iron Deficiency Anemia    --prior endoscopy procedures with Dr. Lenard Harding  --Final pathology from EGD with colonoscopy negative for malignancy on 11/11/2020    --Patient has monthly blood loss with menses as her only site of bleeding, not a vegetarian. Denies blood loss from any other orifice;however, has a positive fecal occult blood test last visit in 2/24  --she has symptomatic anemia reported  as fatigue  --no other causes of anemia noted  --rec pRBC for hgb <7  --She was  treated with IV iron using Venofer again and f/u as planned today    --ferritin is normal now and hgb; continue oral iron therapy and f/u in 3 mo    2.) Fecal Occult blood test with loose stools w/ H.Pylori infection    --suspect this pt is bleeding from the bowel. She saw Tyrel Thurston soon in GI in 3/24 and found to have H.Pylori infection, now s/p triple therapy  --dicussed w/ pt that she needs to undergo eradication testing to verify the infection has gone as AGA can not be fixed until H.Pylori has resolved    MDM: Moderate Risk    Darrion Izaguirre MD  [facility]  [facility] [address] [place] [address]

## 2024-05-30 ENCOUNTER — MED REC SCAN ONLY (OUTPATIENT)
Dept: INTERNAL MEDICINE CLINIC | Facility: CLINIC | Age: 49
End: 2024-05-30

## 2024-06-01 ENCOUNTER — LAB ENCOUNTER (OUTPATIENT)
Dept: LAB | Facility: HOSPITAL | Age: 49
End: 2024-06-01
Attending: STUDENT IN AN ORGANIZED HEALTH CARE EDUCATION/TRAINING PROGRAM
Payer: COMMERCIAL

## 2024-06-01 DIAGNOSIS — B96.81 HELICOBACTER PYLORI GASTRITIS: ICD-10-CM

## 2024-06-01 DIAGNOSIS — K29.70 HELICOBACTER PYLORI GASTRITIS: ICD-10-CM

## 2024-06-01 PROCEDURE — 83013 H PYLORI (C-13) BREATH: CPT

## 2024-06-03 LAB — H PYLORI BREATH TEST: NEGATIVE

## 2024-07-05 ENCOUNTER — TELEPHONE (OUTPATIENT)
Dept: INTERNAL MEDICINE CLINIC | Facility: CLINIC | Age: 49
End: 2024-07-05

## 2024-07-05 DIAGNOSIS — Z12.31 SCREENING MAMMOGRAM FOR BREAST CANCER: Primary | ICD-10-CM

## 2024-07-11 ENCOUNTER — LAB ENCOUNTER (OUTPATIENT)
Dept: LAB | Age: 49
End: 2024-07-11
Attending: NURSE PRACTITIONER
Payer: COMMERCIAL

## 2024-07-11 ENCOUNTER — OFFICE VISIT (OUTPATIENT)
Dept: INTERNAL MEDICINE CLINIC | Facility: CLINIC | Age: 49
End: 2024-07-11

## 2024-07-11 VITALS
DIASTOLIC BLOOD PRESSURE: 70 MMHG | HEIGHT: 61 IN | HEART RATE: 71 BPM | SYSTOLIC BLOOD PRESSURE: 138 MMHG | BODY MASS INDEX: 34.74 KG/M2 | WEIGHT: 184 LBS

## 2024-07-11 DIAGNOSIS — N32.81 DETRUSOR INSTABILITY: ICD-10-CM

## 2024-07-11 DIAGNOSIS — K59.1 FUNCTIONAL DIARRHEA: ICD-10-CM

## 2024-07-11 DIAGNOSIS — Z00.00 ADULT GENERAL MEDICAL EXAM: Primary | ICD-10-CM

## 2024-07-11 DIAGNOSIS — Z00.00 ANNUAL PHYSICAL EXAM: ICD-10-CM

## 2024-07-11 DIAGNOSIS — Z00.00 ADULT GENERAL MEDICAL EXAM: ICD-10-CM

## 2024-07-11 DIAGNOSIS — N39.41 URGE INCONTINENCE: ICD-10-CM

## 2024-07-11 DIAGNOSIS — R19.7 DIARRHEA, UNSPECIFIED TYPE: ICD-10-CM

## 2024-07-11 DIAGNOSIS — R35.1 NOCTURIA: ICD-10-CM

## 2024-07-11 LAB
ALBUMIN SERPL-MCNC: 5 G/DL (ref 3.2–4.8)
ALBUMIN/GLOB SERPL: 1.9 {RATIO} (ref 1–2)
ALP LIVER SERPL-CCNC: 72 U/L
ALT SERPL-CCNC: 27 U/L
ANION GAP SERPL CALC-SCNC: 8 MMOL/L (ref 0–18)
AST SERPL-CCNC: 19 U/L (ref ?–34)
BILIRUB SERPL-MCNC: 0.4 MG/DL (ref 0.3–1.2)
BUN BLD-MCNC: 27 MG/DL (ref 9–23)
BUN/CREAT SERPL: 30.3 (ref 10–20)
CALCIUM BLD-MCNC: 10.2 MG/DL (ref 8.7–10.4)
CHLORIDE SERPL-SCNC: 107 MMOL/L (ref 98–112)
CHOLEST SERPL-MCNC: 148 MG/DL (ref ?–200)
CO2 SERPL-SCNC: 27 MMOL/L (ref 21–32)
CREAT BLD-MCNC: 0.89 MG/DL
DEPRECATED RDW RBC AUTO: 49.5 FL (ref 35.1–46.3)
EGFRCR SERPLBLD CKD-EPI 2021: 80 ML/MIN/1.73M2 (ref 60–?)
ERYTHROCYTE [DISTWIDTH] IN BLOOD BY AUTOMATED COUNT: 17.8 % (ref 11–15)
FASTING PATIENT LIPID ANSWER: YES
FASTING STATUS PATIENT QL REPORTED: YES
GLOBULIN PLAS-MCNC: 2.7 G/DL (ref 2–3.5)
GLUCOSE BLD-MCNC: 99 MG/DL (ref 70–99)
HCT VFR BLD AUTO: 42.5 %
HDLC SERPL-MCNC: 50 MG/DL (ref 40–59)
HGB BLD-MCNC: 13.5 G/DL
LDLC SERPL CALC-MCNC: 83 MG/DL (ref ?–100)
MCH RBC QN AUTO: 24.4 PG (ref 26–34)
MCHC RBC AUTO-ENTMCNC: 31.8 G/DL (ref 31–37)
MCV RBC AUTO: 76.9 FL
NONHDLC SERPL-MCNC: 98 MG/DL (ref ?–130)
OSMOLALITY SERPL CALC.SUM OF ELEC: 299 MOSM/KG (ref 275–295)
PLATELET # BLD AUTO: 285 10(3)UL (ref 150–450)
POTASSIUM SERPL-SCNC: 4.2 MMOL/L (ref 3.5–5.1)
PROT SERPL-MCNC: 7.7 G/DL (ref 5.7–8.2)
RBC # BLD AUTO: 5.53 X10(6)UL
SODIUM SERPL-SCNC: 142 MMOL/L (ref 136–145)
TRIGL SERPL-MCNC: 74 MG/DL (ref 30–149)
TSI SER-ACNC: 0.7 MIU/ML (ref 0.55–4.78)
VIT B12 SERPL-MCNC: 549 PG/ML (ref 211–911)
VIT D+METAB SERPL-MCNC: 36.6 NG/ML (ref 30–100)
VLDLC SERPL CALC-MCNC: 12 MG/DL (ref 0–30)
WBC # BLD AUTO: 7.3 X10(3) UL (ref 4–11)

## 2024-07-11 PROCEDURE — 82607 VITAMIN B-12: CPT

## 2024-07-11 PROCEDURE — 86003 ALLG SPEC IGE CRUDE XTRC EA: CPT

## 2024-07-11 PROCEDURE — 82785 ASSAY OF IGE: CPT

## 2024-07-11 PROCEDURE — 36415 COLL VENOUS BLD VENIPUNCTURE: CPT

## 2024-07-11 PROCEDURE — 3075F SYST BP GE 130 - 139MM HG: CPT | Performed by: NURSE PRACTITIONER

## 2024-07-11 PROCEDURE — 82306 VITAMIN D 25 HYDROXY: CPT

## 2024-07-11 PROCEDURE — 87338 HPYLORI STOOL AG IA: CPT

## 2024-07-11 PROCEDURE — 85027 COMPLETE CBC AUTOMATED: CPT

## 2024-07-11 PROCEDURE — 3078F DIAST BP <80 MM HG: CPT | Performed by: NURSE PRACTITIONER

## 2024-07-11 PROCEDURE — 80061 LIPID PANEL: CPT

## 2024-07-11 PROCEDURE — 80053 COMPREHEN METABOLIC PANEL: CPT

## 2024-07-11 PROCEDURE — 84443 ASSAY THYROID STIM HORMONE: CPT

## 2024-07-11 PROCEDURE — 3008F BODY MASS INDEX DOCD: CPT | Performed by: NURSE PRACTITIONER

## 2024-07-11 PROCEDURE — 85060 BLOOD SMEAR INTERPRETATION: CPT

## 2024-07-11 PROCEDURE — 99396 PREV VISIT EST AGE 40-64: CPT | Performed by: NURSE PRACTITIONER

## 2024-07-11 RX ORDER — SACCHAROMYCES BOULARDII 250 MG
250 CAPSULE ORAL 2 TIMES DAILY
Qty: 90 CAPSULE | Refills: 2 | Status: SHIPPED | OUTPATIENT
Start: 2024-07-11

## 2024-07-11 RX ORDER — OXYBUTYNIN CHLORIDE 10 MG/1
10 TABLET, EXTENDED RELEASE ORAL DAILY
Qty: 90 TABLET | Refills: 3 | Status: SHIPPED | OUTPATIENT
Start: 2024-07-11

## 2024-07-11 NOTE — ASSESSMENT & PLAN NOTE
Complains of frequent stools but she has been taking Miralax everyday    Plan  Patient would like to recheck a stool for H pylori  Stop Miralax  Florastor 250mg po BID

## 2024-07-11 NOTE — ASSESSMENT & PLAN NOTE
Normal exam.  Labs as ordered.  Skin check normal.  No significant abnormal nevi.  Breast exam completed-no palpable abnormalities, discharge from the nipples or axillary adenopathy.  No cervical or inguinal lymphadenopathy.  Hernial orifices intact.  .  Immunizations-Up to date

## 2024-07-11 NOTE — PROGRESS NOTES
HPI:    Patient ID: Vianey Cantu is a 49 year old female.  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    Diarrhea  When she eats food she has to run to the bathroom and has diarrhea  She saw a GI specialist.  Recent H-Pylori infection- Treated.  She has been taking Mirlax every day.    Colonoscopy- 3/11/2024  Mammogram-will schedule today    Immunization History   Administered Date(s) Administered    Covid-19 Vaccine Pfizer 30 mcg/0.3 ml 2021, 2021, 2021    FLU VAC QIV SPLIT 3 YRS AND OLDER (25662) 2017    FLULAVAL 6 months & older 0.5 ml Prefilled syringe (27853) 2017, 2019, 10/20/2020, 10/28/2021    FLUZONE 6 months and older PFS 0.5 ml (32126) 2017    Fluvirin, 3 Years & >, Im 10/15/2010    Influenza 10/03/2013    TDAP 2019       Past Medical History:    Abdominal pain in female    LLQ. Colonoscopy : NL (10-14-15) except hemorrhoids.     Abnormal Pap smear of cervix    CHAYITO 1    Amenorrhea    Anemia    Anxiety    Cyst of buttocks    cyst on left buttocks, removed    Cyst of ovary, right    Decorative tattoo    Elevated liver enzymes    Fatty liver.     Esophageal reflux    S/P Lap. Nissen fundoplication.    Hiatal hernia    High blood pressure    High cholesterol    Incontinence    Umbilical hernia      Past Surgical History:   Procedure Laterality Date    Bravo 96hr - internal  2013    [place] 30 1st 48, 16 2nd 48          x 3      2006    Cholecystectomy      Colonoscopy  2020    Colonoscopy      Colonoscopy      Colonoscopy N/A 3/11/2024    Procedure: COLONOSCOPY;  Surgeon: Tyrel Thurston MD;  Location: [facility] ENDO    Egd  2013    Egd  2020    Egd      Esophageal manometry - internal  2013    normal    Excis primary ganglion wrist Left     wrist x2    Forearm/wrist surgery unlisted Left     wrist tendon surgery     Hemorrhoidectomy  11/2015    internal and anal skin tag removeal. Benign.     Hernia surgery      Laparoscopic fundoplasty  11/27/2013    hiatal hernia     Daniel localization wire 1 site left (cpt=19281) Left 07/2017    sebaceous cyst    Other surgical history      Lasik    Other surgical history Left     ankle tendon repair    Tubal ligation  2014      Social History     Socioeconomic History    Marital status:    Occupational History    Occupation: Retired. Was dental ass't.    Tobacco Use    Smoking status: Never    Smokeless tobacco: Never   Vaping Use    Vaping status: Never Used   Substance and Sexual Activity    Alcohol use: No    Drug use: Never    Sexual activity: Yes     Partners: Male     Birth control/protection: Tubal Ligation   Other Topics Concern    Caffeine Concern No    Exercise Yes     Comment: walks for 20 minutes, 3 times per week.          Review of Systems   Constitutional:  Negative for chills, fatigue and fever.   HENT:  Negative for ear pain, hearing loss, sinus pain, sore throat, trouble swallowing and voice change.    Eyes:  Negative for pain and visual disturbance.   Respiratory:  Negative for cough, chest tightness and shortness of breath.    Cardiovascular:  Negative for chest pain, palpitations and leg swelling.   Gastrointestinal:  Positive for abdominal distention and diarrhea. Negative for abdominal pain, constipation, nausea and vomiting.   Endocrine: Negative for cold intolerance and heat intolerance.   Genitourinary:  Negative for dysuria and hematuria.   Musculoskeletal:  Negative for back pain and joint swelling.   Skin:  Negative for rash.   Allergic/Immunologic: Negative for environmental allergies and food allergies.   Neurological:  Negative for weakness, numbness and headaches.   Hematological:  Does not bruise/bleed easily.   Psychiatric/Behavioral:  Negative for dysphoric mood and sleep disturbance. The patient is not nervous/anxious.               Current  Outpatient Medications   Medication Sig Dispense Refill    oxybutynin ER 10 MG Oral Tablet 24 Hr Take 1 tablet (10 mg total) by mouth daily. 90 tablet 3    saccharomyces boulardii (FLORASTOR) 250 MG Oral Cap Take 1 capsule (250 mg total) by mouth 2 (two) times daily. 90 capsule 2    metoprolol succinate ER 25 MG Oral Tablet 24 Hr Take 0.5 tablets (12.5 mg total) by mouth daily. 45 tablet 3    simvastatin 20 MG Oral Tab Take 1 tablet (20 mg total) by mouth nightly. 90 tablet 3    ibuprofen 800 MG Oral Tab Take 1 tablet (800 mg total) by mouth 2 (two) times daily as needed for Pain. 30 tablet 3    FLUoxetine 20 MG Oral Cap Take 1 capsule (20 mg total) by mouth daily. 90 capsule 1    Ferrous Sulfate 325 (65 Fe) MG Oral Tab Take 1 tablet (325 mg total) by mouth daily with food. (Patient not taking: Reported on 7/11/2024) 30 tablet 2    amoxicillin 500 MG Oral Cap Take 2 mg by mouth 2 (two) times daily. (Patient not taking: Reported on 7/11/2024)      Omeprazole 40 MG Oral Capsule Delayed Release Take 1 capsule (40 mg total) by mouth daily. (Patient not taking: Reported on 5/17/2024)       Allergies:No Known Allergies   PHYSICAL EXAM:   Physical Exam  Constitutional:       Appearance: Normal appearance. She is well-developed.   HENT:      Head: Normocephalic.      Right Ear: Tympanic membrane normal.      Left Ear: Tympanic membrane normal.      Nose: Nose normal.      Mouth/Throat:      Mouth: Mucous membranes are moist.      Pharynx: No oropharyngeal exudate or posterior oropharyngeal erythema.   Eyes:      General:         Right eye: No discharge.         Left eye: No discharge.      Pupils: Pupils are equal, round, and reactive to light.   Cardiovascular:      Rate and Rhythm: Normal rate and regular rhythm.      Heart sounds: Normal heart sounds. No murmur heard.     No friction rub. No gallop.   Pulmonary:      Effort: Pulmonary effort is normal. No respiratory distress.      Breath sounds: Normal breath sounds. No  wheezing, rhonchi or rales.   Abdominal:      General: Bowel sounds are normal. There is no distension.      Palpations: Abdomen is soft. There is no mass.      Tenderness: There is no abdominal tenderness. There is no right CVA tenderness, left CVA tenderness or guarding.   Musculoskeletal:         General: No tenderness.      Cervical back: Normal range of motion and neck supple. No tenderness.      Right lower leg: No edema.      Left lower leg: No edema.   Lymphadenopathy:      Cervical: No cervical adenopathy.   Skin:     General: Skin is warm and dry.      Findings: No rash.   Neurological:      Mental Status: She is alert and oriented to person, place, and time.      Coordination: Coordination normal.      Gait: Gait normal.   Psychiatric:         Mood and Affect: Mood normal.         Behavior: Behavior normal.         Thought Content: Thought content normal.         Judgment: Judgment normal.       /70 (BP Location: Right arm, Patient Position: Sitting, Cuff Size: adult)   Pulse 71   Ht 5' 1\" (1.549 m)   Wt 184 lb (83.5 kg)   LMP 06/06/2024 (Exact Date)   BMI 34.77 kg/m²   Wt Readings from Last 2 Encounters:   07/11/24 184 lb (83.5 kg)   05/17/24 180 lb 12.8 oz (82 kg)     Body mass index is 34.77 kg/m².(2)  Lab Results   Component Value Date    WBC 6.2 05/17/2024    RBC 5.49 (H) 05/17/2024    HGB 12.3 05/17/2024    HCT 41.6 05/17/2024    MCV 75.8 (L) 05/17/2024    MCH 22.4 (L) 05/17/2024    MCHC 29.6 (L) 05/17/2024    RDW 22.8 (H) 05/17/2024    .0 05/17/2024    MPV 7.9 09/22/2018      Lab Results   Component Value Date     (H) 02/23/2024    BUN 17 02/23/2024    BUNCREA 23.6 (H) 02/23/2024    CREATSERUM 0.72 02/23/2024    ANIONGAP 5 02/23/2024    GFR >60 02/17/2016    GFRNAA 101 07/23/2022    GFRAA 117 07/23/2022    CA 9.8 02/23/2024    OSMOCALC 296 (H) 02/23/2024    ALKPHO 75 02/23/2024    AST 15 02/23/2024    ALT 19 02/23/2024    ALKPHOS 65 06/16/2016    BILT 0.4 02/23/2024    TP  7.2 02/23/2024    ALB 4.8 02/23/2024    GLOBULIN 2.4 (L) 02/23/2024    AGRATIO 1.4 06/16/2016     02/23/2024    K 3.9 02/23/2024     02/23/2024    CO2 28.0 02/23/2024      Lab Results   Component Value Date     (H) 06/27/2023    A1C 6.1 (H) 06/27/2023      Lab Results   Component Value Date    CHOLEST 160 06/27/2023    TRIG 157 (H) 06/27/2023    HDL 33 (L) 06/27/2023    LDL 99 06/27/2023    VLDL 26 06/27/2023    NONHDLC 127 06/27/2023    CALCNONHDL 134 (H) 07/01/2015      Lab Results   Component Value Date    T4F 0.80 02/15/2013    TSH 2.110 06/27/2023                ASSESSMENT/PLAN:     Problem List Items Addressed This Visit       Functional diarrhea     Complains of frequent stools but she has been taking Miralax everyday    Plan  Patient would like to recheck a stool for H pylori  Stop Miralax  Florastor 250mg po BID         Relevant Medications    saccharomyces boulardii (FLORASTOR) 250 MG Oral Cap    Diarrhea    Relevant Medications    saccharomyces boulardii (FLORASTOR) 250 MG Oral Cap    Other Relevant Orders    H. Pylori Stool Ag, EIA [E]    Annual physical exam     Normal exam.  Labs as ordered.  Skin check normal.  No significant abnormal nevi.  Breast exam completed-no palpable abnormalities, discharge from the nipples or axillary adenopathy.  No cervical or inguinal lymphadenopathy.  Hernial orifices intact.  .  Immunizations-Up to date           Adult general medical exam - Primary    Relevant Orders    Comp Metabolic Panel (14)    Lipid Panel    Vitamin D, 25-Hydroxy    Vitamin B12    TSH W Reflex To Free T4    CBC, NO DIFFERENTIAL/PLATELET    Adult Food Allergy Prof [E]     Other Visit Diagnoses       Urge incontinence        Relevant Medications    oxybutynin ER 10 MG Oral Tablet 24 Hr    Detrusor instability        Relevant Medications    oxybutynin ER 10 MG Oral Tablet 24 Hr    Nocturia        Relevant Medications    oxybutynin ER 10 MG Oral Tablet 24 Hr               Orders  Placed This Encounter   Procedures    Comp Metabolic Panel (14)    Lipid Panel    Vitamin D, 25-Hydroxy    Vitamin B12    TSH W Reflex To Free T4    CBC, NO DIFFERENTIAL/PLATELET    Adult Food Allergy Prof [E]    H. Pylori Stool Ag, EIA [E]       Meds This Visit:  Requested Prescriptions     Signed Prescriptions Disp Refills    oxybutynin ER 10 MG Oral Tablet 24 Hr 90 tablet 3     Sig: Take 1 tablet (10 mg total) by mouth daily.    saccharomyces boulardii (FLORASTOR) 250 MG Oral Cap 90 capsule 2     Sig: Take 1 capsule (250 mg total) by mouth 2 (two) times daily.       Imaging & Referrals:  None         JERZY Galvan

## 2024-07-12 ENCOUNTER — LAB ENCOUNTER (OUTPATIENT)
Dept: LAB | Facility: HOSPITAL | Age: 49
End: 2024-07-12
Attending: NURSE PRACTITIONER
Payer: COMMERCIAL

## 2024-07-12 ENCOUNTER — TELEPHONE (OUTPATIENT)
Dept: INTERNAL MEDICINE CLINIC | Facility: CLINIC | Age: 49
End: 2024-07-12

## 2024-07-12 DIAGNOSIS — R19.7 DIARRHEA, UNSPECIFIED TYPE: ICD-10-CM

## 2024-07-12 NOTE — TELEPHONE ENCOUNTER
Patient called and is asking to speak to a nurse in regards to her labs that were done yesterday, once reviewed please notify.

## 2024-07-13 LAB — H PYLORI AG STL QL IA: NEGATIVE

## 2024-07-15 NOTE — TELEPHONE ENCOUNTER
Spoke with patient, (name and  confirmed) notified of results and recommendations as advised per JERZY Galvan.  Patient verbalized understanding and agrees with the plan of care.  patient had no further questions/concerns at this time.    *stated insurance won't cover Florastor and will start purchasing it over the counter     Copied and pasted from 24 test results:    Vianey Cantu     Anemia is improved     CMP is OK  Bun 27- slightly dehydrated  Lipid panel is good  Thyroid function is good  Vitamin B and D are good     Maye Shell DNP  Working with    Written by JERZY Galvan on 2024  9:14 PM CDT  Seen by patient Vianey Cantu on 2024  9:22 PM    H pylori is negative     Maye Shell DNP  Working with    Written by JERZY Galvan on 2024  7:44 PM CDT

## 2024-07-20 LAB
ALLERGEN BRAZIL NUT: <0.1 KUA/L (ref ?–0.1)
ALMOND IGE QN: <0.1 KUA/L (ref ?–0.1)
CASHEW NUT IGE QN: <0.1 KUA/L (ref ?–0.1)
CLAM IGE QN: <0.1 KUA/L (ref ?–0.1)
CODFISH IGE QN: <0.1 KUA/L (ref ?–0.1)
CORN IGE QN: <0.1 KUA/L (ref ?–0.1)
COW MILK IGE QN: <0.1 KUA/L (ref ?–0.1)
EGG WHITE IGE QN: <0.1 KUA/L (ref ?–0.1)
HAZELNUT IGE QN: <0.1 KUA/L (ref ?–0.1)
IGE SERPL-ACNC: 9.76 KU/L (ref 2–214)
PEANUT IGE QN: <0.1 KUA/L (ref ?–0.1)
SALMON IGE QN: <0.1 KUA/L (ref ?–0.1)
SCALLOP IGE QN: <0.1 KUA/L (ref ?–0.1)
SESAME SEED IGE QN: <0.1 KUA/L (ref ?–0.1)
SHRIMP IGE QN: <0.1 KUA/L (ref ?–0.1)
SOYBEAN IGE QN: <0.1 KUA/L (ref ?–0.1)
WALNUT IGE QN: <0.1 KUA/L (ref ?–0.1)
WHEAT IGE QN: <0.1 KUA/L (ref ?–0.1)

## 2024-08-17 ENCOUNTER — LAB ENCOUNTER (OUTPATIENT)
Dept: LAB | Facility: HOSPITAL | Age: 49
End: 2024-08-17
Attending: INTERNAL MEDICINE
Payer: COMMERCIAL

## 2024-08-17 DIAGNOSIS — A04.8 H. PYLORI INFECTION: ICD-10-CM

## 2024-08-17 DIAGNOSIS — E61.1 IRON DEFICIENCY: ICD-10-CM

## 2024-08-17 LAB
BASOPHILS # BLD AUTO: 0.05 X10(3) UL (ref 0–0.2)
BASOPHILS NFR BLD AUTO: 0.7 %
DEPRECATED HBV CORE AB SER IA-ACNC: 8.5 NG/ML
DEPRECATED RDW RBC AUTO: 45.8 FL (ref 35.1–46.3)
EOSINOPHIL # BLD AUTO: 0.03 X10(3) UL (ref 0–0.7)
EOSINOPHIL NFR BLD AUTO: 0.4 %
ERYTHROCYTE [DISTWIDTH] IN BLOOD BY AUTOMATED COUNT: 16.5 % (ref 11–15)
HCT VFR BLD AUTO: 38.8 %
HGB BLD-MCNC: 12.9 G/DL
IMM GRANULOCYTES # BLD AUTO: 0.07 X10(3) UL (ref 0–1)
IMM GRANULOCYTES NFR BLD: 1 %
IRON SATN MFR SERPL: 9 %
IRON SERPL-MCNC: 38 UG/DL
LYMPHOCYTES # BLD AUTO: 1.89 X10(3) UL (ref 1–4)
LYMPHOCYTES NFR BLD AUTO: 26.6 %
MCH RBC QN AUTO: 25.9 PG (ref 26–34)
MCHC RBC AUTO-ENTMCNC: 33.2 G/DL (ref 31–37)
MCV RBC AUTO: 77.8 FL
MONOCYTES # BLD AUTO: 0.56 X10(3) UL (ref 0.1–1)
MONOCYTES NFR BLD AUTO: 7.9 %
NEUTROPHILS # BLD AUTO: 4.5 X10 (3) UL (ref 1.5–7.7)
NEUTROPHILS # BLD AUTO: 4.5 X10(3) UL (ref 1.5–7.7)
NEUTROPHILS NFR BLD AUTO: 63.4 %
PLATELET # BLD AUTO: 276 10(3)UL (ref 150–450)
RBC # BLD AUTO: 4.99 X10(6)UL
TIBC SERPL-MCNC: 447 UG/DL (ref 250–425)
TRANSFERRIN SERPL-MCNC: 300 MG/DL (ref 250–380)
WBC # BLD AUTO: 7.1 X10(3) UL (ref 4–11)

## 2024-08-17 PROCEDURE — 36415 COLL VENOUS BLD VENIPUNCTURE: CPT

## 2024-08-17 PROCEDURE — 85025 COMPLETE CBC W/AUTO DIFF WBC: CPT

## 2024-08-17 PROCEDURE — 82728 ASSAY OF FERRITIN: CPT

## 2024-08-17 PROCEDURE — 83540 ASSAY OF IRON: CPT

## 2024-08-17 PROCEDURE — 84466 ASSAY OF TRANSFERRIN: CPT

## 2024-08-23 ENCOUNTER — OFFICE VISIT (OUTPATIENT)
Dept: HEMATOLOGY/ONCOLOGY | Facility: HOSPITAL | Age: 49
End: 2024-08-23
Attending: INTERNAL MEDICINE
Payer: COMMERCIAL

## 2024-08-23 VITALS
HEIGHT: 61 IN | BODY MASS INDEX: 34.93 KG/M2 | TEMPERATURE: 99 F | HEART RATE: 65 BPM | WEIGHT: 185 LBS | OXYGEN SATURATION: 99 % | RESPIRATION RATE: 16 BRPM | DIASTOLIC BLOOD PRESSURE: 50 MMHG | SYSTOLIC BLOOD PRESSURE: 110 MMHG

## 2024-08-23 DIAGNOSIS — E61.1 IRON DEFICIENCY: Primary | ICD-10-CM

## 2024-08-23 PROCEDURE — 99213 OFFICE O/P EST LOW 20 MIN: CPT | Performed by: INTERNAL MEDICINE

## 2024-08-23 NOTE — PROGRESS NOTES
Hematology Progress Note    Patient Name: Vianey Cantu   YOB: 1975   Medical Record Number: [id number]   CSN: [id number]   Consulting Physician: Darrion Izaguirre MD  Referring Physician(s): Eber Gonzalez MD  Date of Visit: 8/23/2024    Chief complaint:  Iron deficiency anemia    History of Present Illness:     Vianey Cantu is a 49 year old female that was seen today in the hematology suite for evaluation of the above condition. Patient with PMH relevant for anxiety that causes sweating day and night presenting for evaluation of lowered iron levels but no signs of anemia.  Patient reports menstrual blood loss is her only site of bleeding.  She reports her cycles last once a month, not associated with heavy flow. Noreen denies any epistaxis, oral pharyngeal bleeding, melena, hematochezia, hematuria.  This patient does not have symptomatic anemia as she denies symptoms of fatigue, chest pain, dyspnea, lightheadedness or dizziness.  Her review of systems is notable for swelling during the day at night as well as alternating constipation with diarrhea.  Patient mentions that oral iron has caused constipation in the past and she did not like the taste of the pills. ROS is otherwise negative.    Interval History:  The patient returns for planned follow-up based on hx of iron deficiency anemia which has returned. Noreen reports that her menstrual cycle blood loss is minimal. She denies blood loss from any other orifice. Review of systems is negative for any new concerns.       Past Medical History:  Past Medical History:    Abdominal pain in female    LLQ. Colonoscopy : NL (10-14-15) except hemorrhoids.     Abnormal Pap smear of cervix    CHAYITO 1    Amenorrhea    Anemia    Anxiety    Cyst of buttocks    cyst on left buttocks, removed    Cyst of ovary, right    Decorative tattoo    Elevated liver enzymes    Fatty liver.     Esophageal reflux    S/P Lap. Nissen fundoplication.    Hiatal hernia    High blood  pressure    High cholesterol    Incontinence    Umbilical hernia       Past Surgical History:  Past Surgical History:   Procedure Laterality Date    Bravo 96hr - internal  2013    [place]ter 30 1st 48, 16 2nd 48          x 3      2006    Cholecystectomy      Colonoscopy  2020    Colonoscopy      Colonoscopy      Colonoscopy N/A 3/11/2024    Procedure: COLONOSCOPY;  Surgeon: Tyrel Thurston MD;  Location: [facility] END[facility]    Egd  2013    Egd  2020    Egd      Esophageal manometry - internal  2013    normal    Excis primary ganglion wrist Left     wrist x2    Forearm/wrist surgery unlisted Left     wrist tendon surgery    Hemorrhoidectomy  2015    internal and anal skin tag removeal. Benign.     Hernia surgery      Laparoscopic fundoplasty  2013    hiatal hernia     Daniel localization wire 1 site left (cpt=19281) Left 2017    sebaceous cyst    Other surgical history      Lasik    Other surgical history Left     ankle tendon repair    Tubal ligation         Family Medical History:  Family History   Problem Relation Age of Onset    Cancer Mother 54        breast cancer     Breast Cancer Mother 54    Cancer Maternal Uncle         melanoma    Diabetes Maternal Grandmother     Stroke Maternal Grandfather     Lipids Father         hyperlipidemia    Diabetes Father     Other (NSVT) Father     Cancer Father         Liver cancer    Bleeding Disorders Neg     Clotting Disorder Neg        Social History:  Social History     Socioeconomic History    Marital status:      Spouse name: Not on file    Number of children: Not on file    Years of education: Not on file    Highest education level: Not on file   Occupational History    Occupation: Retired. Was dental ass't.    Tobacco Use    Smoking status: Never    Smokeless tobacco: Never   Vaping Use    Vaping status: Never Used   Substance and Sexual Activity    Alcohol use: No    Drug use: Never    Sexual activity: Yes      Partners: Male     Birth control/protection: Tubal Ligation   Other Topics Concern     Service Not Asked    Blood Transfusions Not Asked    Caffeine Concern No    Occupational Exposure Not Asked    Hobby Hazards Not Asked    Sleep Concern Not Asked    Stress Concern Not Asked    Weight Concern Not Asked    Special Diet Not Asked    Back Care Not Asked    Exercise Yes     Comment: walks for 20 minutes, 3 times per week.    Bike Helmet Not Asked    Seat Belt Not Asked    Self-Exams Not Asked   Social History Narrative    Noreen is  x 18yrs. She has 3 children. 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 Noreen lives with her family in [place], [place].      Social Determinants of Health     Financial Resource Strain: Not on file   Food Insecurity: Not on file   Transportation Needs: Not on file   Physical Activity: Not on file   Stress: Not on file   Social Connections: Not on file   Housing Stability: Not on file       Allergies:   No Known Allergies    Current Medications:   oxybutynin ER 10 MG Oral Tablet 24 Hr Take 1 tablet (10 mg total) by mouth daily. 90 tablet 3    saccharomyces boulardii (FLORASTOR) 250 MG Oral Cap Take 1 capsule (250 mg total) by mouth 2 (two) times daily. 90 capsule 2    metoprolol succinate ER 25 MG Oral Tablet 24 Hr Take 0.5 tablets (12.5 mg total) by mouth daily. 45 tablet 3    simvastatin 20 MG Oral Tab Take 1 tablet (20 mg total) by mouth nightly. 90 tablet 3    FLUoxetine 20 MG Oral Cap Take 1 capsule (20 mg total) by mouth daily. 90 capsule 1       Review of Systems:    Constitutional: Negative for anorexia, chills, fevers, negative intermittent night sweats, decreased appetite, or fatigue  Eyes: Negative for visual disturbance, irritation and redness.  Respiratory: Negative for cough, hemoptysis, chest pain, or dyspnea on exertion.  Gastrointestinal: Negative for dysphagia, odynophagia, reflux symptoms, nausea, vomiting, change in bowel habits, or abdominal pain    Integument/breast: Negative for rash, skin lesions, and pruritus.  Hematologic/lymphatic: Negative for easy bruising, bleeding, and lymphadenopathy.  Musculoskeletal: Negative for myalgias, arthralgias, muscle weakness.  Neurological: Negative for headaches, dizziness, speech problems, gait problems and weakness.    A comprehensive 14 point review of systems was completed.  Pertinent positives and negatives noted in the the HPI.     Vital Signs:  /50 (BP Location: Left arm, Patient Position: Sitting, Cuff Size: large)   Pulse 65   Temp 98.5 °F (36.9 °C) (Oral)   Resp 16   Ht 1.549 m (5' 1\")   Wt 83.9 kg (185 lb)   LMP 06/06/2024 (Exact Date)   SpO2 99%   BMI 34.96 kg/m²     Physical Examination:    General: Patient is alert and oriented x 3, not in acute distress.  Psych:  Friendly, cooperative with appropriate questions and responses  HEENT: EOMs intact. Oropharynx is clear.   Neck: No palpable lymphadenopathy. Neck is supple.  Lymphatics: There is no palpable lymphadenopathy throughout in the cervical, supraclavicular, axillary, or inguinal regions.  Chest: Clear to auscultation. No wheezes or rales.  Heart: Regular rate and rhythm. S1S2 normal.  Abdomen: Soft, non tender with good bowel sounds.  No hepatosplenomegaly.  No palpable mass.  Extremities: No edema or calf tenderness.  Neurological: Grossly intact.      Labs:    Lab Results   Component Value Date/Time    WBC 7.1 08/17/2024 08:20 AM    RBC 4.99 08/17/2024 08:20 AM    HGB 12.9 08/17/2024 08:20 AM    HCT 38.8 08/17/2024 08:20 AM    MCV 77.8 (L) 08/17/2024 08:20 AM    MCH 25.9 (L) 08/17/2024 08:20 AM    MCHC 33.2 08/17/2024 08:20 AM    RDW 16.5 (H) 08/17/2024 08:20 AM    NEPRELIM 4.50 08/17/2024 08:20 AM    .0 08/17/2024 08:20 AM       Lab Results   Component Value Date/Time    GLU 99 07/11/2024 08:15 AM    BUN 27 (H) 07/11/2024 08:15 AM    CREATSERUM 0.89 07/11/2024 08:15 AM    GFRNAA 101 07/23/2022 11:13 AM    CA 10.2 07/11/2024  08:15 AM    ALB 5.0 (H) 07/11/2024 08:15 AM     07/11/2024 08:15 AM    K 4.2 07/11/2024 08:15 AM     07/11/2024 08:15 AM    CO2 27.0 07/11/2024 08:15 AM    ALKPHO 72 07/11/2024 08:15 AM    AST 19 07/11/2024 08:15 AM    ALT 27 07/11/2024 08:15 AM         Impression:    No diagnosis found.      49 year old W with PMH relevant for anxiety that causes sweating day and night presenting for follow up of iron deficiency anemia       Plan:    1.) Iron Deficiency Anemia    --prior endoscopy procedures with Dr. Lenard Harding  --Final pathology from EGD with colonoscopy negative for malignancy on 11/11/2020    --Patient has monthly blood loss with menses as her only site of bleeding, not a vegetarian. Denies blood loss from any other orifice;had a positive fecal occult blood test last visit in 2/24  --she has symptomatic anemia reported as fatigue  --no other causes of anemia noted  --rec pRBC for hgb <7  --She was  treated with IV iron using Venofer    --current labs show she needs IV iron again, planning Injectafer based on venofer shortage, discussed risk for allergic/anaphylactic reaction    --discussed that she may need to undergo capsule endoscopy to r/o small bowel bleeding, reached out to her gastroenterologist to see if he will consider her for this procedure    --f/u in 8 wk with our group    2.) Fecal Occult blood test with loose stools w/ H.Pylori infection    --suspect this pt may be bleeding from the small bowel. She saw Tyrel Thurston soon in GI in 3/24 and found to have H.Pylori infection, now s/p triple therapy  --dicussed that post tx H.Pylori infection has resolved by stool testing in 7/24    MDM: Low Risk    Darrion Izaguirre MD  [facility]  [address], [place] [address]

## 2024-08-24 ENCOUNTER — HOSPITAL ENCOUNTER (OUTPATIENT)
Dept: MAMMOGRAPHY | Facility: HOSPITAL | Age: 49
Discharge: HOME | End: 2024-08-24
Attending: INTERNAL MEDICINE
Payer: COMMERCIAL

## 2024-08-24 DIAGNOSIS — Z12.31 SCREENING MAMMOGRAM FOR BREAST CANCER: ICD-10-CM

## 2024-08-24 PROCEDURE — 77063 BREAST TOMOSYNTHESIS BI: CPT | Performed by: INTERNAL MEDICINE

## 2024-08-24 PROCEDURE — 77067 SCR MAMMO BI INCL CAD: CPT | Performed by: INTERNAL MEDICINE

## 2024-08-28 ENCOUNTER — TELEPHONE (OUTPATIENT)
Facility: CLINIC | Age: 49
End: 2024-08-28

## 2024-08-28 ENCOUNTER — TELEPHONE (OUTPATIENT)
Dept: HEMATOLOGY/ONCOLOGY | Facility: HOSPITAL | Age: 49
End: 2024-08-28

## 2024-08-28 NOTE — TELEPHONE ENCOUNTER
GI staff:    Patient needs clinic visit with me to discuss video capsule endoscopy for iron deficiency.    Can we please add to my clinic schedule end of September.    Thank you

## 2024-09-04 ENCOUNTER — TELEPHONE (OUTPATIENT)
Facility: CLINIC | Age: 49
End: 2024-09-04

## 2024-09-04 ENCOUNTER — OFFICE VISIT (OUTPATIENT)
Facility: CLINIC | Age: 49
End: 2024-09-04

## 2024-09-04 VITALS
DIASTOLIC BLOOD PRESSURE: 77 MMHG | HEIGHT: 61 IN | BODY MASS INDEX: 35.63 KG/M2 | HEART RATE: 56 BPM | WEIGHT: 188.69 LBS | SYSTOLIC BLOOD PRESSURE: 132 MMHG

## 2024-09-04 DIAGNOSIS — D50.9 IRON DEFICIENCY ANEMIA, UNSPECIFIED IRON DEFICIENCY ANEMIA TYPE: Primary | ICD-10-CM

## 2024-09-04 DIAGNOSIS — B96.81 HELICOBACTER PYLORI GASTRITIS: ICD-10-CM

## 2024-09-04 DIAGNOSIS — K29.70 HELICOBACTER PYLORI GASTRITIS: ICD-10-CM

## 2024-09-04 PROCEDURE — 3008F BODY MASS INDEX DOCD: CPT

## 2024-09-04 PROCEDURE — 99214 OFFICE O/P EST MOD 30 MIN: CPT

## 2024-09-04 PROCEDURE — 3075F SYST BP GE 130 - 139MM HG: CPT

## 2024-09-04 PROCEDURE — 3078F DIAST BP <80 MM HG: CPT

## 2024-09-04 NOTE — PROGRESS NOTES
[facility] - Gastroenterology                                                                                                      Clinic Follow-up Visit    Chief Complaint   Patient presents with    Anemia     Last Colon and  Egd was 3/2024         Subjective/HPI:   Vianey Cantu is a 49 year old year old female with active medical conditions including hyperlipidemia, hypertension, iron deficiency anemia, migraine, spinal stenosis, anxiety,  and history of nissen fundoplication and other hx listed in note table.     she is here today for follow-up.   #iron deficiency anemia  #H. Pylori gastritis   -hx of iron deficiency anemia for 10+ years. Hemoglobin stable since May 12-13s, s/p iron infusions in march and april. Iron level similar to prior 45 in May and now 38 on 8/17. Ferritin decreased since prior, was 17.8 in May, 8.5 on 8/17.  -s/p EGD/CLN in March, +H. Pylori gastritis, treated with amox/clarithromycin/omeprazole triple therapy. H. Pylori retest 6/1 was negative  -follows with hematology as well, last OV on 8/23, ordered IV iron, pt does not tolerate taste and constipation from oral iron. Menses are regular and last 2 days. No overt GI bleeding. Advised to follow up with GI for possible VCE to assess for small bowel blood loss    Negative for:   Abdominal/rectal pain, unintentional weight loss, dysphagia, anorexia, overt GI bleed/melena, fever.    PRIOR GI WORK UP:       Last colonoscopy: 3/11/24 Dr. Thurston  -no polyps  -internal hemorrhoids  -no etiology of anemia/altered bowel habits  Last EGD: 3/11/24 Dr. Thurston  -mild gastritis  -no etiology of anemia/altered bowel habits    A. Random colon biopsies:   Multiple fragments of colonic mucosa with mild nonspecific chronic inflammation, and focal lamina propria benign lymphoid aggregate.  No evidence of significant glandular distortion, acute cryptitis, granuloma,  dysplasia, or malignancy is identified.     B. Duodenum biopsies:   Multiple fragments of small bowel mucosa demonstrating no significant pathologic changes.  Normal villous architecture present.  No evidence of celiac sprue, granuloma, dysplasia, or malignancy is identified.     C. Gastric biopsy:   Multiple fragments of gastric mucosa with mild chronic active H. Pylori-associated gastritis.  Diff-Quik stain (with appropriate control reactivity) is positive for H. pylori     Wt Readings from Last 6 Encounters:   24 188 lb 11.2 oz (85.6 kg)   24 185 lb (83.9 kg)   24 184 lb (83.5 kg)   24 180 lb 12.8 oz (82 kg)   24 177 lb 12.8 oz (80.6 kg)   24 179 lb 14.4 oz (81.6 kg)        History, Medications, Allergies, ROS:      Past Medical History:    Abdominal pain in female    LLQ. Colonoscopy : NL (10-14-15) except hemorrhoids.     Abnormal Pap smear of cervix    CHAYITO 1    Amenorrhea    Anemia    Anxiety    Cyst of buttocks    cyst on left buttocks, removed    Cyst of ovary, right    Decorative tattoo    Elevated liver enzymes    Fatty liver.     Esophageal reflux    S/P Lap. Nissen fundoplication.    Hiatal hernia    High blood pressure    High cholesterol    Incontinence    Umbilical hernia      Past Surgical History:   Procedure Laterality Date    Bravo 96hr - internal  2013    De[place]ter 30 1st 48, 16 2nd 48          x 3      2006    Cholecystectomy      Colonoscopy  2020    Colonoscopy      Colonoscopy      Colonoscopy N/A 3/11/2024    Procedure: COLONOSCOPY;  Surgeon: Tyrel Thurston MD;  Location: [facility] ENDO    Egd  2013    Egd  2020    Egd      Esophageal manometry - internal  2013    normal    Excis primary ganglion wrist Left     wrist x2    Forearm/wrist surgery unlisted Left     wrist tendon surgery    Hemorrhoidectomy  2015    internal and anal skin tag removeal. Benign.     Hernia surgery      Laparoscopic fundoplasty   11/27/2013    hiatal hernia     Daniel localization wire 1 site left (cpt=19281) Left 07/2017    sebaceous cyst    Other surgical history      Lasik    Other surgical history Left     ankle tendon repair    Tubal ligation  2014      Family History   Problem Relation Age of Onset    Cancer Mother 54        breast cancer     Breast Cancer Mother 54    Cancer Maternal Uncle         melanoma    Diabetes Maternal Grandmother     Stroke Maternal Grandfather     Lipids Father         hyperlipidemia    Diabetes Father     Other (NSVT) Father     Cancer Father         Liver cancer    Bleeding Disorders Neg     Clotting Disorder Neg       Social History:   Social History     Socioeconomic History    Marital status:    Occupational History    Occupation: Retired. Was dental ass't.    Tobacco Use    Smoking status: Never    Smokeless tobacco: Never   Vaping Use    Vaping status: Never Used   Substance and Sexual Activity    Alcohol use: No    Drug use: Never    Sexual activity: Yes     Partners: Male     Birth control/protection: Tubal Ligation   Other Topics Concern    Caffeine Concern No    Exercise Yes     Comment: walks for 20 minutes, 3 times per week.   Social History Narrative    Noreen is  x 20yrs. She has 3 children. 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 Noreen lives with her family in [place], [place].         Medications (Active prior to today's visit):  Current Outpatient Medications   Medication Sig Dispense Refill    oxybutynin ER 10 MG Oral Tablet 24 Hr Take 1 tablet (10 mg total) by mouth daily. 90 tablet 3    saccharomyces boulardii (FLORASTOR) 250 MG Oral Cap Take 1 capsule (250 mg total) by mouth 2 (two) times daily. 90 capsule 2    metoprolol succinate ER 25 MG Oral Tablet 24 Hr Take 0.5 tablets (12.5 mg total) by mouth daily. 45 tablet 3    simvastatin 20 MG Oral Tab Take 1 tablet (20 mg total) by mouth nightly. 90 tablet 3    FLUoxetine 20 MG Oral Cap Take 1 capsule (20 mg total) by mouth daily. 90  capsule 1    polyethylene glycol, PEG 3350-KCl-NaBcb-NaCl-NaSulf, 236 g Oral Recon Soln Take 2,000 mL by mouth once for 1 dose. Take 2,000 mL the night before your procedure 2000 mL 0       Allergies:  No Known Allergies    ROS:   CONSTITUTIONAL: negative for fevers, chills, sweats  EYES Negative for scleral icterus or redness, and diplopia  HEENT: Negative for hoarseness  RESPIRATORY: Negative for cough and severe shortness of breath  CARDIOVASCULAR: Negative for crushing sub-sternal chest pain  GASTROINTESTINAL: See HPI  GENITOURINARY: Negative for dysuria  MUSCULOSKELETAL: Negative for arthralgias and myalgias  SKIN: Negative for jaundice, rash or pruritus  NEUROLOGICAL: Negative for dizziness and headaches  BEHAVIOR/PSYCH: Negative for psychotic behavior    PHYSICAL EXAM:   Blood pressure 132/77, pulse 56, height 5' 1\" (1.549 m), weight 188 lb 11.2 oz (85.6 kg), last menstrual period 08/28/2024, not currently breastfeeding.    Gen- alert, no acute distress, well-nourished  HEENT: anicteric sclera, neck supple, trachea midline, MMM, no palpable or tender neck or supraclavicular lymph nodes  CV- RRR, the extremities are warm and well perfused   Lungs- No increased work of breathing. CTAB   Abdomen- Soft, symmetrical, non-tender without distention or guarding. No scars or lesions. Aorta is without bruit or visible pulsation. Umbilicus is midline without herniation. Normoactive bowel sounds are present, No masses, hepatomegaly or splenomegaly noted.  MSK: no erythema, warmth, no swelling of joints  Skin- No jaundice, erythema, rashes or lesions  Hematologic- no bleeding or bruising  Neuro- Alert and interactive, CONNORS   Psych - appropriate mood & affect    Labs/Imaging:     Patient's labs and imaging were reviewed and discussed with patient today.     .  ASSESSMENT/PLAN:   Vianey Cantu is a 49 year old year old female with active medical conditions including hyperlipidemia, hypertension, iron deficiency anemia,  migraine, spinal stenosis, anxiety,  and history of nissen fundoplication and other hx listed in note table.     she is here today for follow-up.   #iron deficiency anemia  #H. Pylori gastritis   -hx of iron deficiency anemia for 10+ years. Hemoglobin stable since May 12-13s, s/p iron infusions in march and april. Iron level similar to prior 45 in May and now 38 on 8/17. Ferritin decreased since prior, was 17.8 in May, 8.5 on 8/17.  -s/p EGD/CLN in March, +H. Pylori gastritis, treated with amox/clarithromycin/omeprazole triple therapy. H. Pylori retest 6/1 was negative  -follows with hematology as well, last OV on 8/23, ordered IV iron, pt does not tolerate taste and constipation from oral iron. Menses are regular and last 2 days. No overt GI bleeding. Advised to follow up with GI for possible VCE to assess for small bowel blood loss    -discussed benefits and risks of VCE with patient and she would like to proceed with procedure. Patient requested resources for diet regarding weight gain, provided with dietician/nutritionist resource. Advised to continue probiotic and increase high iron foods.       Recommendations:  -video capsule endoscopy with Dr. Thurston for iron deficiency anemia, 2L golytely bowel prep per Dr. Thurston    -can continue florastor    -high iron foods such as proteins (meat, poultry, seafood), and plant based (nuts, seeds, beans, lentils, chickpeas, spinach, broccoli, etc)    -for dietician resources  [facility] dietician and/or weight management # [phone].  Virtual resources such as \"[place] nutrition\"    Risks/benefits of Video Capsule Endoscopy including but not limited to aspiration, missed lesion, and capsule retention discussed with patient and they would like to proceed with VCE.      Orders This Visit:  No orders of the defined types were placed in this encounter.      Meds This Visit:  Requested Prescriptions      No prescriptions requested or ordered in this encounter       Imaging &  Referrals:  None     JERZY Street    [facility] Gastroenterology  9/4/2024    The dictation was partially prepared using Dragon Medical voice recognition software. As a result, errors may occur. When identified, these errors have been corrected. While every attempt is made to correct errors during dictation, discrepancies may still exist.

## 2024-09-04 NOTE — TELEPHONE ENCOUNTER
Take 2L of golytely between 6pm and 9pm the night before the colonoscopy.    NPO after midnight.    thanks

## 2024-09-04 NOTE — TELEPHONE ENCOUNTER
Patient was seen in office today with JERZY Street and was given orders to schedule. Please call patient to schedule his/her procedure with the orders given below. Patient was given the phone number and prep instructions at the time of the office visit. The prep instructions was also explained to the patient at the time of the visit. The patient verbalized understanding the prep and that a GI  will call him/her for the procedure.  If patient calls before the 1 week turnaround please schedule with the orders given below, thank you!    Orders from JERZY Street   video capsule endoscopy with Dr. Thurston for iron deficiency anemia  -prep per Dr. Thurston

## 2024-09-04 NOTE — PATIENT INSTRUCTIONS
-video capsule endoscopy with Dr. Thurston for iron deficiency anemia  -prep per Dr. Thurston    -can continue florastor    -high iron foods such as proteins (meat, poultry, seafood), and plant based (nuts, seeds, beans, lentils, chickpeas, spinach, broccoli, etc)    -for dietician resources  [facility] dietician and/or weight management # [phone].  Virtual resources such as \"[place] nutrition\"

## 2024-09-05 NOTE — TELEPHONE ENCOUNTER
Ppd-   Patient is scheduled for VCE on 9/27/24 with Dr. Thurston at [facility].   Thank you!

## 2024-09-05 NOTE — TELEPHONE ENCOUNTER
Scheduled for:  Video Capsule Endoscopy 417[address]  Provider Name:  Jamie Thurston   Date:  9/27/2024   Location:    [facility]  Sedation:  mac  Time:  7:30 (pt is aware that ENDO will call the day before to confirm arrival time)  Prep:  2L of golytely between 6pm and 9pm the night before the colonoscopy.  NPO after midnight.  Meds/Allergies Reconciled?:  Physician reviewed   Diagnosis with codes:  iron deficiency anemia D50.9   Was patient informed to call insurance with codes (Y/N):  Yes, I confirmed bcbs  insurance with the patient.   Referral sent?:  Referral was sent at the time of electronic surgical scheduling.  [facility] or [facility] notified?:  I sent an electronic request to Endo Scheduling and received a confirmation today.   Medication Orders:  This patient verbally confirmed that she is not taking:   Iron, blood thinners, BP meds, and is not diabetic   Not latex allergy, Not PCN allergy and does not have a pacemaker  Misc Orders:  I discussed the prep instructions with the patient which she verbally understood and is aware that I will mychart  the instructions today.    Further instructions given by staff:

## 2024-09-06 ENCOUNTER — OFFICE VISIT (OUTPATIENT)
Dept: HEMATOLOGY/ONCOLOGY | Facility: HOSPITAL | Age: 49
End: 2024-09-06
Attending: INTERNAL MEDICINE
Payer: COMMERCIAL

## 2024-09-06 VITALS
SYSTOLIC BLOOD PRESSURE: 128 MMHG | RESPIRATION RATE: 16 BRPM | TEMPERATURE: 98 F | DIASTOLIC BLOOD PRESSURE: 69 MMHG | OXYGEN SATURATION: 100 % | HEART RATE: 55 BPM

## 2024-09-06 DIAGNOSIS — E61.1 IRON DEFICIENCY: Primary | ICD-10-CM

## 2024-09-06 PROCEDURE — 96374 THER/PROPH/DIAG INJ IV PUSH: CPT

## 2024-09-06 NOTE — PROGRESS NOTES
Pt here for Injectafer dose 1/1. Patient reports having received iron products in the past. R AC PIV started, good blood return noted. Injectafer infused over 15 min, patient tolerated. Post vitals stable. Pt denies any issues or concerns. PIV removed.     Ordering Provider: Dmitriy     Pt tolerated infusion without difficulty or complaint. Reviewed next apt date/time: f/u with Esther 10/31      Education Record  Learner:  Patient  Disease / Diagnosis: AGA  Barriers / Limitations:  None  Method:  Discussion  General Topics:  Medication, Side effects and symptom management, and Plan of care reviewed  Outcome:  Shows understanding

## 2024-09-06 NOTE — TELEPHONE ENCOUNTER
Called [facility] at [phone], spoke with Pratibha PEÑA, no PA needed, reference number [id number]. Referral updated.

## 2024-09-25 ENCOUNTER — TELEPHONE (OUTPATIENT)
Facility: CLINIC | Age: 49
End: 2024-09-25

## 2024-09-25 NOTE — TELEPHONE ENCOUNTER
Patient is requesting to speak with an RN.   She wants to go over instructions for 9/27 Colonoscopy.  Patient states that she starts work at 10 am, so she is requesting a call back today or tomorrow before 10 am.  Please call

## 2024-09-26 NOTE — TELEPHONE ENCOUNTER
Called and spoke to patient, verified . Reviewed with the patient her video capsule endoscopy instructions. All questions answered and patient verbalized understanding.

## 2024-09-27 ENCOUNTER — HOSPITAL ENCOUNTER (OUTPATIENT)
Facility: HOSPITAL | Age: 49
Setting detail: HOSPITAL OUTPATIENT SURGERY
Discharge: HOME | End: 2024-09-27
Attending: STUDENT IN AN ORGANIZED HEALTH CARE EDUCATION/TRAINING PROGRAM | Admitting: STUDENT IN AN ORGANIZED HEALTH CARE EDUCATION/TRAINING PROGRAM
Payer: COMMERCIAL

## 2024-09-27 VITALS
SYSTOLIC BLOOD PRESSURE: 129 MMHG | RESPIRATION RATE: 12 BRPM | WEIGHT: 187 LBS | HEIGHT: 61 IN | HEART RATE: 57 BPM | OXYGEN SATURATION: 97 % | BODY MASS INDEX: 35.3 KG/M2 | DIASTOLIC BLOOD PRESSURE: 77 MMHG

## 2024-09-27 LAB — B-HCG UR QL: NEGATIVE

## 2024-09-27 PROCEDURE — 81025 URINE PREGNANCY TEST: CPT

## 2024-09-27 PROCEDURE — 0DJ07ZZ INSPECTION OF UPPER INTESTINAL TRACT, VIA NATURAL OR ARTIFICIAL OPENING: ICD-10-PCS | Performed by: STUDENT IN AN ORGANIZED HEALTH CARE EDUCATION/TRAINING PROGRAM

## 2024-09-27 NOTE — DISCHARGE INSTRUCTIONS
Capsule Endoscopy  Capsule endoscopy is a test done to take pictures of the digestive tract. It uses a capsule with a tiny camera in it. The capsule is swallowed like a pill. As it moves through the digestive tract, the camera sends pictures to a recorder worn outside the body. After a few days, the capsule passes out of the body through the rectum during a bowel movement.   Capsule endoscopy is most often done to check for problems in the small bowel (intestine) that are hard to see with a standard endoscopy or colonoscopy. These problems include bleeding and tumors. The test can also help diagnose Crohn’s disease. This is a condition that causes inflammation, sores, and narrowing of the bowel. It can also look for problems caused by celiac disease.      The capsule is a tiny camera that takes pictures as it moves through the digestive tract.     Before the test  Tell your healthcare provider about any medicines you're taking. You may need to stop taking some or all of them before the test. This includes:   Prescription medicines  Over-the-counter medicines, such as aspirin or ibuprofen  Illegal drugs  Herbs, vitamins, and other supplements  You should also:   Tell your provider about any current health conditions and prior surgeries that could prevent the capsule from moving freely through your digestive tract.  Switch to a clear liquid diet 16 hours before the test.  Stop eating 12 hours before the test.  Do a bowel cleansing or use a laxative to clear your bowels, if told to do so by your provider.  Follow any other instructions from your provider.  During the test  Depending on your condition, your provider may have you get an imaging test before the capsule endoscopy. Or they may have you swallow a test capsule, which dissolves after a few hours. These steps can help decide if you're at high risk of not passing the capsule.   The capsule endoscopy will be done in a provider’s office or hospital. During the  test:   You’ll be asked to raise your shirt.  You'll most likely have small, sticky, round patches placed on the skin over your belly (abdomen). The patches have antennas that are attached to short wires (leads). The wires are then plugged into a data recorder.  The recorder will be attached to a belt worn around your waist. The recorder will be checked to make sure it's working.  You’ll be given the capsule to swallow. The capsule will start sending pictures to the recorder. It will keep sending pictures as it moves through your stomach and small bowel.  In rare cases, the capsule may be placed directly in your small bowel, instead of swallowing it. In this case, the provider will insert a thin, flexible tube (endoscope) through your mouth and down into the digestive tract. Your provider will tell you more about this, if needed.   After the test  After you swallow the capsule, you should be able to go home. You can drink clear liquids after 2 hours. You can eat food or take medicine after 4 hours. Be sure to follow any other instructions from your provider. For instance, you may need to avoid some physical activities that could affect your test results.   You should also:   Take steps to prevent complications. For instance, don't have an MRI or go near an MRI device until the capsule has passed out of your body.  Watch for the capsule to pass out of your body during a bowel movement. It can be flushed down the toilet with your stool. However, you may not see the capsule in the toilet. If the capsule doesn't pass within 3 days, let your provider know right away. You may need to have it removed. Your provider will tell you more about this, if needed.  Go back to your provider’s office or the hospital to have the patches and recorder taken off, as instructed by your provider.  Once your provider reviews the pictures, they'll go over the results with you. This is often done within a few days. If the pictures are blurry  or unclear, the test may need to be done again.   Risks of the test  There is a chance the capsule won't pass out of your body on its own. This is rare. When this happens, the capsule is most likely stuck in a narrow spot within your bowels (bowel obstruction). Tell your healthcare provider right away if you have any of these:   Unusual bloating  Abdominal pain  Nausea or vomiting  An X-ray can help see if the capsule is still in your body. If it is, you'll need surgery or endoscopy to remove the capsule.   Vonnie last reviewed this educational content on 2/1/2022 © 2000-2023 [employer]. All rights reserved. This information is not intended as a substitute for professional medical care. Always follow your healthcare professional's instructions.

## 2024-09-30 NOTE — OPERATIVE REPORT
VIDEO CAPSULE ENDOSCOPY REPORT    Vianey Cantu     1975 Age 49 year old   PCP Eber Gonzalez MD Gastroenterologist Tyrel Thurston MD       Date of procedure: 2024    Pre-operative diagnosis: anemia    Post-operative diagnosis: see impression    Procedure:  Informed consent was obtained from the patient after the risks of the procedure were discussed, including but not limited to aspiration, missed lesion, and capsule retention. After discussions of the risks/benefits and alternatives to this procedure, the patient signed consent. A standard 8 hour video capsule was administered  after the patient prepped with a bowel laxative.     Findings:   1. Stomach entry (1st gastric image): 00:00:24  2. Small bowel (1st duodenal image): 00:07:03  3. Colon (1st cecal image): N/A  4. Red spots/bleeding: There was a tiny red spot at 03:39:33; small mucosal break at 04:27:27  5. Other: There were air bubbles seen throughout the visualized small intestine limiting some view. No large lesions seen.    Impression:  1. Incomplete study as the pill camera did not reach the cecum. It reached the small bowel after 7 minutes and therefore this patient has slowed intestinal transit. However, based on this limited study there was no obvious source of anemia seen. No blood. No large AVMs.    Recommendations:  1. Follow up with hematology.  2. Can consider CT enterography study in the future.  3. Would not repeat as same result would likely occur (slowed transit).  4. Can repeat if develops overt GI bleeding.    Called the patient with the above result.    Tyrel Thurston MD

## 2024-09-30 NOTE — H&P
History & Physical Examination    Patient Name: Vianey Cantu  MRN: [id number]  CSN: [id number]  YOB: 1975    Diagnosis: Anemia. Pill camera today.    No medications prior to admission.     No current facility-administered medications for this encounter.       Allergies: No Known Allergies    Past Medical History:    Abdominal pain in female    LLQ. Colonoscopy : NL (10-14-15) except hemorrhoids.     Abnormal Pap smear of cervix    CHAYITO 1    Amenorrhea    Anemia    Anxiety    Cyst of buttocks    cyst on left buttocks, removed    Cyst of ovary, right    Decorative tattoo    Elevated liver enzymes    Fatty liver.     Esophageal reflux    S/P Lap. Nissen fundoplication.    Hiatal hernia    High blood pressure    High cholesterol    Incontinence    Umbilical hernia     Past Surgical History:   Procedure Laterality Date    Bravo 96hr - internal  2013    [place]ter 30 1st 48, 16 2nd 48          x 3      2006    Cholecystectomy      Colonoscopy  2020    Colonoscopy      Colonoscopy      Colonoscopy N/A 3/11/2024    Procedure: COLONOSCOPY;  Surgeon: Tyrel Thurston MD;  Location: [facility] ENDO    Egd  2013    Egd  2020    Egd      Esophageal manometry - internal  2013    normal    Excis primary ganglion wrist Left     wrist x2    Forearm/wrist surgery unlisted Left     wrist tendon surgery    Hemorrhoidectomy  2015    internal and anal skin tag removeal. Benign.     Hernia surgery      Laparoscopic fundoplasty  2013    hiatal hernia     Daniel localization wire 1 site left (cpt=19281) Left 2017    sebaceous cyst    Other surgical history      Lasik    Other surgical history Left     ankle tendon repair    Tubal ligation       Family History   Problem Relation Age of Onset    Cancer Mother 54        breast cancer     Breast Cancer Mother 54    Cancer Maternal Uncle         melanoma    Diabetes Maternal Grandmother     Stroke Maternal Grandfather      Lipids Father         hyperlipidemia    Diabetes Father     Other (NSVT) Father     Cancer Father         Liver cancer    Bleeding Disorders Neg     Clotting Disorder Neg      Social History     Tobacco Use    Smoking status: Never    Smokeless tobacco: Never   Substance Use Topics    Alcohol use: No       SYSTEM Check if Review is Normal Check if Physical Exam is Normal If not normal, please explain:   HEENT [X ] [ X]    NECK  [X ] [ X]    HEART [X ] [ X]    LUNGS [X ] [ X]    ABDOMEN [X ] [ X]    EXTREMITIES [X ] [ X]    OTHER        I have discussed the risks and benefits and alternatives of the procedure with the patient/family.  They understand and agree to proceed with plan of care.   I have reviewed the History and Physical done within the last 30 days.  Any changes noted above.    Tyrel Thurston MD  [facility] [address] Gastroenterology

## 2024-10-10 ENCOUNTER — OFFICE VISIT (OUTPATIENT)
Dept: INTERNAL MEDICINE CLINIC | Facility: CLINIC | Age: 49
End: 2024-10-10

## 2024-10-10 VITALS
HEIGHT: 61 IN | SYSTOLIC BLOOD PRESSURE: 130 MMHG | HEART RATE: 58 BPM | BODY MASS INDEX: 35.68 KG/M2 | WEIGHT: 189 LBS | DIASTOLIC BLOOD PRESSURE: 70 MMHG

## 2024-10-10 DIAGNOSIS — R35.1 NOCTURIA: ICD-10-CM

## 2024-10-10 DIAGNOSIS — E66.812 CLASS 2 OBESITY DUE TO EXCESS CALORIES WITHOUT SERIOUS COMORBIDITY WITH BODY MASS INDEX (BMI) OF 35.0 TO 35.9 IN ADULT: ICD-10-CM

## 2024-10-10 DIAGNOSIS — E66.09 CLASS 2 OBESITY DUE TO EXCESS CALORIES WITHOUT SERIOUS COMORBIDITY WITH BODY MASS INDEX (BMI) OF 35.0 TO 35.9 IN ADULT: ICD-10-CM

## 2024-10-10 DIAGNOSIS — D50.0 IRON DEFICIENCY ANEMIA DUE TO CHRONIC BLOOD LOSS: Primary | ICD-10-CM

## 2024-10-10 DIAGNOSIS — N32.81 DETRUSOR INSTABILITY: ICD-10-CM

## 2024-10-10 DIAGNOSIS — K59.1 FUNCTIONAL DIARRHEA: ICD-10-CM

## 2024-10-10 DIAGNOSIS — N39.41 URGE INCONTINENCE: ICD-10-CM

## 2024-10-10 PROCEDURE — 3075F SYST BP GE 130 - 139MM HG: CPT | Performed by: NURSE PRACTITIONER

## 2024-10-10 PROCEDURE — 3078F DIAST BP <80 MM HG: CPT | Performed by: NURSE PRACTITIONER

## 2024-10-10 PROCEDURE — 3008F BODY MASS INDEX DOCD: CPT | Performed by: NURSE PRACTITIONER

## 2024-10-10 PROCEDURE — 99214 OFFICE O/P EST MOD 30 MIN: CPT | Performed by: NURSE PRACTITIONER

## 2024-10-10 RX ORDER — OXYBUTYNIN CHLORIDE 10 MG/1
10 TABLET, EXTENDED RELEASE ORAL DAILY
Qty: 90 TABLET | Refills: 3 | Status: SHIPPED | OUTPATIENT
Start: 2024-10-10

## 2024-10-10 RX ORDER — SIMVASTATIN 20 MG
20 TABLET ORAL NIGHTLY
Qty: 90 TABLET | Refills: 3 | Status: SHIPPED | OUTPATIENT
Start: 2024-10-10

## 2024-10-10 RX ORDER — METOPROLOL SUCCINATE 25 MG/1
12.5 TABLET, EXTENDED RELEASE ORAL DAILY
Qty: 45 TABLET | Refills: 3 | Status: SHIPPED | OUTPATIENT
Start: 2024-10-10

## 2024-10-10 NOTE — PROGRESS NOTES
HPI:    Patient ID: Vianey Cantu is a 49 year old female.    HPI Follow up on Diarrhea  49 year old female who I last seen for diarrhea.  The [place] [facility] is working for her diarrhea.  She is having normal bowel movements  Hx of H Pylori gastritis  Hx of Iron deficiency anemia    Negative for:   Abdominal/rectal pain, unintentional weight loss, dysphagia, anorexia, overt GI bleed/melena, fever.    Fatigue  From Iron deficiency anemia      Obesity  Would like to lose weight    Iron Deficiency  Working with Dr. Izaguirre and receiving Iron infusing    RECENT Capsule Endoscopy- Battery failed. She does not want to repeat this    Last colonoscopy: 3/11/24 Dr. Thurston  -no polyps  -internal hemorrhoids  -no etiology of anemia/altered bowel habits  Last EGD: 3/11/24 Dr. Thurston  -mild gastritis  -no etiology of anemia/altered bowel habits    Immunization History   Administered Date(s) Administered    Covid-19 Vaccine Pfizer 30 mcg/0.3 ml 2021, 2021, 2021    FLU VAC QIV SPLIT 3 YRS AND OLDER (11755) 2017    FLULAVAL 6 months & older 0.5 ml Prefilled syringe (16723) 2017, 2019, 10/20/2020, 10/28/2021    FLUZONE 6 months and older PFS 0.5 ml (98971) 2017    Fluvirin, 3 Years & >, Im 10/15/2010    Influenza 10/03/2013    TDAP 2019       Past Medical History:    Abdominal pain in female    LLQ. Colonoscopy : NL (10-14-15) except hemorrhoids.     Abnormal Pap smear of cervix    CHAYITO 1    Amenorrhea    Anemia    Anxiety    Cyst of buttocks    cyst on left buttocks, removed    Cyst of ovary, right    Decorative tattoo    Elevated liver enzymes    Fatty liver.     Esophageal reflux    S/P Lap. Nissen fundoplication.    Hiatal hernia    High blood pressure    High cholesterol    Incontinence    Umbilical hernia      Past Surgical History:   Procedure Laterality Date    Bravo 96hr - internal  2013    [place] 30 1st 48, 16 2nd 48          x 3      2006     Cholecystectomy      Colonoscopy  11/2020    Colonoscopy  09/20    Colonoscopy      Colonoscopy N/A 3/11/2024    Procedure: COLONOSCOPY;  Surgeon: Tyrel Thurston MD;  Location: [facility] END[facility]    Egd  11/2013    Egd  11/2020    Egd      Esophageal manometry - internal  11/2013    normal    Excis primary ganglion wrist Left     wrist x2    Forearm/wrist surgery unlisted Left     wrist tendon surgery    Hemorrhoidectomy  11/2015    internal and anal skin tag removeal. Benign.     Hernia surgery      Laparoscopic fundoplasty  11/27/2013    hiatal hernia     Daniel localization wire 1 site left (cpt=19281) Left 07/2017    sebaceous cyst    Other surgical history      Lasik    Other surgical history Left     ankle tendon repair    Tubal ligation  2014      Social History     Socioeconomic History    Marital status:    Occupational History    Occupation: Retired. Was dental ass't.    Tobacco Use    Smoking status: Never    Smokeless tobacco: Never   Vaping Use    Vaping status: Never Used   Substance and Sexual Activity    Alcohol use: No    Drug use: Never    Sexual activity: Yes     Partners: Male     Birth control/protection: Tubal Ligation   Other Topics Concern    Caffeine Concern No    Exercise Yes     Comment: walks for 20 minutes, 3 times per week.          Review of Systems   Constitutional:  Negative for chills, fatigue and fever.   HENT:  Negative for ear pain, hearing loss, sinus pain, sore throat and trouble swallowing.    Eyes:  Negative for pain and visual disturbance.   Respiratory:  Negative for cough, chest tightness and shortness of breath.    Cardiovascular:  Negative for chest pain, palpitations and leg swelling.   Gastrointestinal:  Negative for abdominal pain, constipation, diarrhea, nausea and vomiting.   Endocrine: Negative for cold intolerance and heat intolerance.   Genitourinary:  Negative for dysuria and hematuria.   Musculoskeletal:  Negative for back pain and joint swelling.   Skin:   Negative for rash.   Allergic/Immunologic: Negative for environmental allergies.   Neurological:  Negative for weakness, numbness and headaches.   Hematological:  Does not bruise/bleed easily.   Psychiatric/Behavioral:  Negative for dysphoric mood and sleep disturbance. The patient is not nervous/anxious.               Current Outpatient Medications   Medication Sig Dispense Refill    oxybutynin ER 10 MG Oral Tablet 24 Hr Take 1 tablet (10 mg total) by mouth daily. 90 tablet 3    metoprolol succinate ER 25 MG Oral Tablet 24 Hr Take 0.5 tablets (12.5 mg total) by mouth daily. 45 tablet 3    FLUoxetine 20 MG Oral Cap Take 1 capsule (20 mg total) by mouth daily. 90 capsule 1    simvastatin 20 MG Oral Tab Take 1 tablet (20 mg total) by mouth nightly. 90 tablet 3    saccharomyces boulardii (FLORASTOR) 250 MG Oral Cap Take 1 capsule (250 mg total) by mouth 2 (two) times daily. 90 capsule 2     Allergies:Allergies[1]   PHYSICAL EXAM:   Physical Exam  Constitutional:       Appearance: Normal appearance. She is well-developed.   HENT:      Head: Normocephalic.   Cardiovascular:      Rate and Rhythm: Normal rate and regular rhythm.      Heart sounds: Normal heart sounds. No murmur heard.     No friction rub. No gallop.   Pulmonary:      Effort: Pulmonary effort is normal. No respiratory distress.      Breath sounds: Normal breath sounds. No wheezing, rhonchi or rales.   Abdominal:      General: Bowel sounds are normal. There is no distension.      Palpations: Abdomen is soft. There is no mass.      Tenderness: There is no abdominal tenderness. There is no right CVA tenderness, left CVA tenderness or guarding.   Musculoskeletal:         General: No tenderness.      Cervical back: Normal range of motion and neck supple. No tenderness.      Right lower leg: No edema.      Left lower leg: No edema.   Lymphadenopathy:      Cervical: No cervical adenopathy.   Skin:     General: Skin is warm and dry.      Findings: No rash.    Neurological:      Mental Status: She is alert and oriented to person, place, and time.      Coordination: Coordination normal.      Gait: Gait normal.   Psychiatric:         Mood and Affect: Mood normal.         Behavior: Behavior normal.         Thought Content: Thought content normal.         Judgment: Judgment normal.       /70 (BP Location: Right arm, Patient Position: Sitting, Cuff Size: adult)   Pulse 58   Ht 5' 1\" (1.549 m)   Wt 189 lb (85.7 kg)   LMP 09/17/2024 (Approximate)   BMI 35.71 kg/m²   Wt Readings from Last 2 Encounters:   10/10/24 189 lb (85.7 kg)   09/23/24 187 lb (84.8 kg)     Body mass index is 35.71 kg/m².(2)  Lab Results   Component Value Date    WBC 7.1 08/17/2024    RBC 4.99 08/17/2024    HGB 12.9 08/17/2024    HCT 38.8 08/17/2024    MCV 77.8 (L) 08/17/2024    MCH 25.9 (L) 08/17/2024    MCHC 33.2 08/17/2024    RDW 16.5 (H) 08/17/2024    .0 08/17/2024    MPV 7.9 09/22/2018      Lab Results   Component Value Date    GLU 99 07/11/2024    BUN 27 (H) 07/11/2024    BUNCREA 30.3 (H) 07/11/2024    CREATSERUM 0.89 07/11/2024    ANIONGAP 8 07/11/2024    GFR >60 02/17/2016    GFRNAA 101 07/23/2022    GFRAA 117 07/23/2022    CA 10.2 07/11/2024    OSMOCALC 299 (H) 07/11/2024    ALKPHO 72 07/11/2024    AST 19 07/11/2024    ALT 27 07/11/2024    ALKPHOS 65 06/16/2016    BILT 0.4 07/11/2024    TP 7.7 07/11/2024    ALB 5.0 (H) 07/11/2024    GLOBULIN 2.7 07/11/2024    AGRATIO 1.4 06/16/2016     07/11/2024    K 4.2 07/11/2024     07/11/2024    CO2 27.0 07/11/2024      Lab Results   Component Value Date     (H) 06/27/2023    A1C 6.1 (H) 06/27/2023      Lab Results   Component Value Date    CHOLEST 148 07/11/2024    TRIG 74 07/11/2024    HDL 50 07/11/2024    LDL 83 07/11/2024    VLDL 12 07/11/2024    NONHDLC 98 07/11/2024    CALCNONHDL 134 (H) 07/01/2015      Lab Results   Component Value Date    T4F 0.80 02/15/2013    TSH 0.702 07/11/2024                ASSESSMENT/PLAN:      Problem List Items Addressed This Visit       Iron deficiency anemia due to chronic blood loss - Primary     Following with Dr. Izaguirre  Iron infusions         Functional diarrhea     Diarrhea- Resolved    Plan  Continue Florastor BID         Class 2 obesity due to excess calories with body mass index (BMI) of 35.0 to 35.9 in adult     Increased BMI.  Has tried Phentermine in the past with adverse reaction- Chest Palpitations    Plan  Plant protein diet  - lifestyle modifications including reductions in dietary total and saturated fat, weight loss, aerobic exercise, and eating a diet rich in fruits and vegetables.  Reduce bread, pasta and rice in your diet.  Eliminate as much sugar from your diet as possible.          Relevant Medications    simvastatin 20 MG Oral Tab     Other Visit Diagnoses       Urge incontinence        Relevant Medications    oxybutynin ER 10 MG Oral Tablet 24 Hr    metoprolol succinate ER 25 MG Oral Tablet 24 Hr    Detrusor instability        Relevant Medications    oxybutynin ER 10 MG Oral Tablet 24 Hr    metoprolol succinate ER 25 MG Oral Tablet 24 Hr    Nocturia        Relevant Medications    oxybutynin ER 10 MG Oral Tablet 24 Hr    metoprolol succinate ER 25 MG Oral Tablet 24 Hr               No orders of the defined types were placed in this encounter.      Meds This Visit:  Requested Prescriptions     Signed Prescriptions Disp Refills    oxybutynin ER 10 MG Oral Tablet 24 Hr 90 tablet 3     Sig: Take 1 tablet (10 mg total) by mouth daily.    metoprolol succinate ER 25 MG Oral Tablet 24 Hr 45 tablet 3     Sig: Take 0.5 tablets (12.5 mg total) by mouth daily.    FLUoxetine 20 MG Oral Cap 90 capsule 1     Sig: Take 1 capsule (20 mg total) by mouth daily.    simvastatin 20 MG Oral Tab 90 tablet 3     Sig: Take 1 tablet (20 mg total) by mouth nightly.       Imaging & Referrals:  None         JERZY Galvan          [1] No Known Allergies

## 2024-10-10 NOTE — ASSESSMENT & PLAN NOTE
Increased BMI.  Has tried Phentermine in the past with adverse reaction- Chest Palpitations    Plan  Plant protein diet  - lifestyle modifications including reductions in dietary total and saturated fat, weight loss, aerobic exercise, and eating a diet rich in fruits and vegetables.  Reduce bread, pasta and rice in your diet.  Eliminate as much sugar from your diet as possible.

## 2024-10-12 ENCOUNTER — LAB ENCOUNTER (OUTPATIENT)
Dept: LAB | Facility: HOSPITAL | Age: 49
End: 2024-10-12
Attending: INTERNAL MEDICINE
Payer: COMMERCIAL

## 2024-10-12 DIAGNOSIS — E61.1 IRON DEFICIENCY: ICD-10-CM

## 2024-10-12 LAB
BASOPHILS # BLD AUTO: 0.06 X10(3) UL (ref 0–0.2)
BASOPHILS NFR BLD AUTO: 0.8 %
DEPRECATED HBV CORE AB SER IA-ACNC: 94 NG/ML
DEPRECATED RDW RBC AUTO: 49.6 FL (ref 35.1–46.3)
EOSINOPHIL # BLD AUTO: 0.03 X10(3) UL (ref 0–0.7)
EOSINOPHIL NFR BLD AUTO: 0.4 %
ERYTHROCYTE [DISTWIDTH] IN BLOOD BY AUTOMATED COUNT: 16.6 % (ref 11–15)
HCT VFR BLD AUTO: 42.1 %
HGB BLD-MCNC: 14.2 G/DL
IMM GRANULOCYTES # BLD AUTO: 0.15 X10(3) UL (ref 0–1)
IMM GRANULOCYTES NFR BLD: 1.9 %
IRON SATN MFR SERPL: 12 %
IRON SERPL-MCNC: 45 UG/DL
LYMPHOCYTES # BLD AUTO: 1.73 X10(3) UL (ref 1–4)
LYMPHOCYTES NFR BLD AUTO: 21.8 %
MCH RBC QN AUTO: 27.5 PG (ref 26–34)
MCHC RBC AUTO-ENTMCNC: 33.7 G/DL (ref 31–37)
MCV RBC AUTO: 81.4 FL
MONOCYTES # BLD AUTO: 0.67 X10(3) UL (ref 0.1–1)
MONOCYTES NFR BLD AUTO: 8.4 %
NEUTROPHILS # BLD AUTO: 5.3 X10 (3) UL (ref 1.5–7.7)
NEUTROPHILS # BLD AUTO: 5.3 X10(3) UL (ref 1.5–7.7)
NEUTROPHILS NFR BLD AUTO: 66.7 %
PLATELET # BLD AUTO: 244 10(3)UL (ref 150–450)
RBC # BLD AUTO: 5.17 X10(6)UL
TIBC SERPL-MCNC: 361 UG/DL (ref 250–425)
TRANSFERRIN SERPL-MCNC: 242 MG/DL (ref 250–380)
WBC # BLD AUTO: 7.9 X10(3) UL (ref 4–11)

## 2024-10-12 PROCEDURE — 36415 COLL VENOUS BLD VENIPUNCTURE: CPT

## 2024-10-12 PROCEDURE — 85025 COMPLETE CBC W/AUTO DIFF WBC: CPT

## 2024-10-12 PROCEDURE — 82728 ASSAY OF FERRITIN: CPT

## 2024-10-12 PROCEDURE — 83540 ASSAY OF IRON: CPT

## 2024-10-12 PROCEDURE — 84466 ASSAY OF TRANSFERRIN: CPT

## 2024-10-31 ENCOUNTER — OFFICE VISIT (OUTPATIENT)
Dept: HEMATOLOGY/ONCOLOGY | Facility: HOSPITAL | Age: 49
End: 2024-10-31
Attending: INTERNAL MEDICINE
Payer: COMMERCIAL

## 2024-10-31 VITALS
BODY MASS INDEX: 35.91 KG/M2 | TEMPERATURE: 98 F | OXYGEN SATURATION: 98 % | SYSTOLIC BLOOD PRESSURE: 133 MMHG | RESPIRATION RATE: 16 BRPM | HEART RATE: 56 BPM | WEIGHT: 190.19 LBS | DIASTOLIC BLOOD PRESSURE: 67 MMHG | HEIGHT: 61 IN

## 2024-10-31 DIAGNOSIS — D50.0 IRON DEFICIENCY ANEMIA DUE TO CHRONIC BLOOD LOSS: ICD-10-CM

## 2024-10-31 DIAGNOSIS — E61.1 IRON DEFICIENCY: Primary | ICD-10-CM

## 2024-10-31 DIAGNOSIS — D50.0 IRON DEFICIENCY ANEMIA DUE TO CHRONIC BLOOD LOSS: Primary | ICD-10-CM

## 2024-10-31 PROCEDURE — 99214 OFFICE O/P EST MOD 30 MIN: CPT | Performed by: NURSE PRACTITIONER

## 2024-10-31 NOTE — PROGRESS NOTES
Hematology Progress Note    Patient Name: Vianey Cantu   YOB: 1975   Medical Record Number: [id number]   CSN: [id number]   Consulting Physician: Darrion Izaguirre MD  Referring Physician(s): Eber Gonzalez MD  Date of Visit: 10/31/24    Chief complaint:  Iron deficiency anemia    History of Present Illness:     Vianey Cantu is a 49 year old female that was seen today in the hematology suite for evaluation of the above condition. Patient with PMH relevant for anxiety that causes sweating day and night presenting for evaluation of lowered iron levels but no signs of anemia.  Patient reports menstrual blood loss is her only site of bleeding.  She reports her cycles last once a month, not associated with heavy flow. Noreen denies any epistaxis, oral pharyngeal bleeding, melena, hematochezia, hematuria.  This patient does not have symptomatic anemia as she denies symptoms of fatigue, chest pain, dyspnea, lightheadedness or dizziness.  Her review of systems is notable for swelling during the day at night as well as alternating constipation with diarrhea.  Patient mentions that oral iron has caused constipation in the past and she did not like the taste of the pills. ROS is otherwise negative.    Interval History:  The patient returns for planned follow-up based on hx of iron deficiency anemia which has returned. Noreen reports that her menstrual cycle blood loss is minimal. She denies blood loss from any other orifice. Review of systems is negative for any new concerns.     Last injectafer was 9/6/24.     GI workup colonoscopy and endoscopy negative plus capsule endoscopy inconclusive.    No signs of bleeding, periods are light.         Past Medical History:  Past Medical History:    Abdominal pain in female    LLQ. Colonoscopy : NL (10-14-15) except hemorrhoids.     Abnormal Pap smear of cervix    CHAYITO 1    Amenorrhea    Anemia    Anxiety    Cyst of buttocks    cyst on left buttocks, removed    Cyst of ovary,  right    Decorative tattoo    Elevated liver enzymes    Fatty liver.     Esophageal reflux    S/P Lap. Nissen fundoplication.    Hiatal hernia    High blood pressure    High cholesterol    Incontinence    Umbilical hernia       Past Surgical History:  Past Surgical History:   Procedure Laterality Date    Bravo 96hr - internal  2013    [place]ter 30 1st 48, 16 2nd 48          x 3      2006    Cholecystectomy      Colonoscopy  2020    Colonoscopy      Colonoscopy      Colonoscopy N/A 3/11/2024    Procedure: COLONOSCOPY;  Surgeon: Tyrel Thurston MD;  Location: [facility] END[facility]    Egd  2013    Egd  2020    Egd      Esophageal manometry - internal  2013    normal    Excis primary ganglion wrist Left     wrist x2    Forearm/wrist surgery unlisted Left     wrist tendon surgery    Hemorrhoidectomy  2015    internal and anal skin tag removeal. Benign.     Hernia surgery      Laparoscopic fundoplasty  2013    hiatal hernia     Daniel localization wire 1 site left (cpt=19281) Left 2017    sebaceous cyst    Other surgical history      Lasik    Other surgical history Left     ankle tendon repair    Tubal ligation         Family Medical History:  Family History   Problem Relation Age of Onset    Cancer Mother 54        breast cancer     Breast Cancer Mother 54    Cancer Maternal Uncle         melanoma    Diabetes Maternal Grandmother     Stroke Maternal Grandfather     Lipids Father         hyperlipidemia    Diabetes Father     Other (NSVT) Father     Cancer Father         Liver cancer    Bleeding Disorders Neg     Clotting Disorder Neg        Social History:  Social History     Socioeconomic History    Marital status:      Spouse name: Not on file    Number of children: Not on file    Years of education: Not on file    Highest education level: Not on file   Occupational History    Occupation: Retired. Was dental ass't.    Tobacco Use    Smoking status: Never     Smokeless tobacco: Never   Vaping Use    Vaping status: Never Used   Substance and Sexual Activity    Alcohol use: No    Drug use: Never    Sexual activity: Yes     Partners: Male     Birth control/protection: Tubal Ligation   Other Topics Concern     Service Not Asked    Blood Transfusions Not Asked    Caffeine Concern No    Occupational Exposure Not Asked    Hobby Hazards Not Asked    Sleep Concern Not Asked    Stress Concern Not Asked    Weight Concern Not Asked    Special Diet Not Asked    Back Care Not Asked    Exercise Yes     Comment: walks for 20 minutes, 3 times per week.    Bike Helmet Not Asked    Seat Belt Not Asked    Self-Exams Not Asked   Social History Narrative    Noreen is  x 18yrs. She has 3 children. 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 Noreen lives with her family in [place], [place].      Social Drivers of Health     Financial Resource Strain: Not on file   Food Insecurity: Not on file   Transportation Needs: Not on file   Physical Activity: Not on file   Stress: Not on file   Social Connections: Not on file   Housing Stability: Not on file       Allergies:   No Known Allergies    Current Medications:   oxybutynin ER 10 MG Oral Tablet 24 Hr Take 1 tablet (10 mg total) by mouth daily. 90 tablet 3    metoprolol succinate ER 25 MG Oral Tablet 24 Hr Take 0.5 tablets (12.5 mg total) by mouth daily. 45 tablet 3    simvastatin 20 MG Oral Tab Take 1 tablet (20 mg total) by mouth nightly. 90 tablet 3    saccharomyces boulardii (FLORASTOR) 250 MG Oral Cap Take 1 capsule (250 mg total) by mouth 2 (two) times daily. 90 capsule 2       Review of Systems:    Constitutional: Negative for anorexia, chills, fevers, negative intermittent night sweats, decreased appetite, or fatigue  Eyes: Negative for visual disturbance, irritation and redness.  Respiratory: Negative for cough, hemoptysis, chest pain, or dyspnea on exertion.  Gastrointestinal: Negative for dysphagia, odynophagia, reflux symptoms, nausea,  vomiting, change in bowel habits, or abdominal pain   Integument/breast: Negative for rash, skin lesions, and pruritus.  Hematologic/lymphatic: Negative for easy bruising, bleeding, and lymphadenopathy.  Musculoskeletal: Negative for myalgias, arthralgias, muscle weakness.  Neurological: Negative for headaches, dizziness, speech problems, gait problems and weakness.    A comprehensive 14 point review of systems was completed.  Pertinent positives and negatives noted in the the HPI.     Vital Signs:  /67 (BP Location: Left arm, Patient Position: Sitting, Cuff Size: adult)   Pulse 56   Temp 97.8 °F (36.6 °C) (Oral)   Resp 16   Ht 1.549 m (5' 1\")   Wt 86.3 kg (190 lb 3.2 oz)   LMP 09/17/2024 (Approximate)   SpO2 98%   BMI 35.94 kg/m²   Wt Readings from Last 6 Encounters:   10/31/24 86.3 kg (190 lb 3.2 oz)   10/10/24 85.7 kg (189 lb)   09/23/24 84.8 kg (187 lb)   09/04/24 85.6 kg (188 lb 11.2 oz)   08/23/24 83.9 kg (185 lb)   07/11/24 83.5 kg (184 lb)         Physical Examination:    General: Patient is alert and oriented x 3, not in acute distress.  Psych:  Friendly, cooperative with appropriate questions and responses  HEENT: EOMs intact. Oropharynx is clear.   Neck: No palpable lymphadenopathy. Neck is supple.  Lymphatics: There is no palpable lymphadenopathy throughout in the cervical, supraclavicular, axillary, or inguinal regions.  Chest: Clear to auscultation. No wheezes or rales.  Heart: Regular rate and rhythm. S1S2 normal.  Abdomen: Soft, non tender with good bowel sounds.  No hepatosplenomegaly.  No palpable mass.  Extremities: No edema or calf tenderness.  Neurological: Grossly intact.      Labs:    Lab Results   Component Value Date/Time    WBC 7.9 10/12/2024 08:11 AM    RBC 5.17 10/12/2024 08:11 AM    HGB 14.2 10/12/2024 08:11 AM    HCT 42.1 10/12/2024 08:11 AM    MCV 81.4 10/12/2024 08:11 AM    MCH 27.5 10/12/2024 08:11 AM    MCHC 33.7 10/12/2024 08:11 AM    RDW 16.6 (H) 10/12/2024 08:11 AM     NEPRELIM 5.30 10/12/2024 08:11 AM    .0 10/12/2024 08:11 AM       Lab Results   Component Value Date/Time    GLU 99 07/11/2024 08:15 AM    BUN 27 (H) 07/11/2024 08:15 AM    CREATSERUM 0.89 07/11/2024 08:15 AM    GFRNAA 101 07/23/2022 11:13 AM    CA 10.2 07/11/2024 08:15 AM    ALB 5.0 (H) 07/11/2024 08:15 AM     07/11/2024 08:15 AM    K 4.2 07/11/2024 08:15 AM     07/11/2024 08:15 AM    CO2 27.0 07/11/2024 08:15 AM    ALKPHO 72 07/11/2024 08:15 AM    AST 19 07/11/2024 08:15 AM    ALT 27 07/11/2024 08:15 AM     Component      Latest Ref Rng 5/17/2024 8/17/2024 10/12/2024   Iron, Serum      50 - 170 ug/dL 45 (L)  38 (L)  45 (L)    Transferrin      250 - 380 mg/dL 304  300  242 (L)    Iron Bind.Cap.(TIBC)      250 - 425 ug/dL 453 (H)  447 (H)  361    Iron Saturation      15 - 50 % 10 (L)  9 (L)  12 (L)    FERRITIN      50 - 306 ng/mL 17.8  8.5 (L)  94           Impression:      ICD-10-CM    1. Iron deficiency  E61.1       2. Iron deficiency anemia due to chronic blood loss  D50.0             49 year old W with PMH relevant for anxiety that causes sweating day and night presenting for follow up of iron deficiency anemia       Plan:    1.) Iron Deficiency Anemia    --prior endoscopy procedures with Dr. Lenard Harding  --Final pathology from EGD with colonoscopy negative for malignancy on 11/11/2020    --Patient has monthly blood loss with menses as her only site of bleeding, not a vegetarian. Denies blood loss from any other orifice;had a positive fecal occult blood test last visit in 2/24  --she has symptomatic anemia reported as fatigue  --no other causes of anemia noted  --rec pRBC for hgb <7  --She was  treated with IV iron injectafer in Sept 2024     --current labs improved Hgb 14.2 Ferritin 94  If needs IV iron again, planning Injectafer based on venofer shortage, discussed risk for allergic/anaphylactic reaction          Follow up 3 months       GI recommend possible CT abd - no abd , GI issue  better with Florastor     2.) Fecal Occult blood test with loose stools w/ H.Pylori infection    --suspect this pt may be bleeding from the small bowel. She saw Tyrel Thurston soon in GI in 3/24 and found to have H.Pylori infection, now s/p triple therapy  --dicussed that post tx H.Pylori infection has resolved by stool testing in 7/24    MDM: Mod Risk    Esther BERTRAND   [facility]  [place] [address] [address]

## 2024-11-27 ENCOUNTER — TELEPHONE (OUTPATIENT)
Dept: INTERNAL MEDICINE CLINIC | Facility: CLINIC | Age: 49
End: 2024-11-27

## 2024-11-27 NOTE — TELEPHONE ENCOUNTER
Patient is requesting refill for Omeprazole 40 MG Oral Capsule Delayed Release (     [facility] - [place], [place] - [address] [phone], [phone]

## 2024-11-29 ENCOUNTER — TELEPHONE (OUTPATIENT)
Facility: CLINIC | Age: 49
End: 2024-11-29

## 2024-11-29 RX ORDER — OMEPRAZOLE 40 MG/1
40 CAPSULE, DELAYED RELEASE ORAL
Qty: 30 CAPSULE | Refills: 0 | Status: SHIPPED | OUTPATIENT
Start: 2024-11-29 | End: 2025-02-27

## 2024-11-29 NOTE — TELEPHONE ENCOUNTER
PPI (Proton Pump Inhibitors): Nexium (Esomeprazole), Protonix (Pantoprazole), Dexilant (Dexlansoprazole), Prevacid (Lansoprazole), Aciphex (Rabeprazole), Omeprazole    Medication not on current med list.  Patient states she is running low.  Please advise. Thank you.      Message sent to Dr. Hawkins who is covering for Dr. Thurston.    Protocol Criteria  Review last office visit note(s), plan of care, for any change     Appointment scheduled within the past 12 months or in the next 3 months or had a procedure (if applicable) and is up to date with their recall     Date of Last Office Visit: 09/04/2024 with Elvira Rizo    Date of next scheduled appointment:     Date of last procedure (if applicable):09/27/2024 capsule endoscopy with Dr. Thurston    Date of recall (if applicable):       If used for Dmitriy's patient is up to date with appointment     Date of Last Office Visit

## 2024-11-29 NOTE — TELEPHONE ENCOUNTER
Patient called, verified Name and . Requesting refill of omeprazole. Chart reviewed. Relayed medication was prescribed by Gastroenterology. Advised to follow up with GI for refill. Call transferred per patient's request.

## 2024-11-29 NOTE — TELEPHONE ENCOUNTER
Patient is requesting for omeprazole  refill I do not see this on the chart please follow up is out of the medication

## 2024-12-01 RX ORDER — OMEPRAZOLE 40 MG/1
40 CAPSULE, DELAYED RELEASE ORAL DAILY
Qty: 90 CAPSULE | Refills: 3 | OUTPATIENT
Start: 2024-12-01

## 2024-12-12 ENCOUNTER — OFFICE VISIT (OUTPATIENT)
Dept: INTERNAL MEDICINE CLINIC | Facility: CLINIC | Age: 49
End: 2024-12-12
Payer: COMMERCIAL

## 2024-12-12 VITALS
HEART RATE: 62 BPM | HEIGHT: 61 IN | DIASTOLIC BLOOD PRESSURE: 60 MMHG | BODY MASS INDEX: 35.43 KG/M2 | SYSTOLIC BLOOD PRESSURE: 120 MMHG | WEIGHT: 187.63 LBS

## 2024-12-12 DIAGNOSIS — E66.812 CLASS 2 OBESITY DUE TO EXCESS CALORIES WITHOUT SERIOUS COMORBIDITY WITH BODY MASS INDEX (BMI) OF 35.0 TO 35.9 IN ADULT: Primary | ICD-10-CM

## 2024-12-12 DIAGNOSIS — E66.09 CLASS 2 OBESITY DUE TO EXCESS CALORIES WITHOUT SERIOUS COMORBIDITY WITH BODY MASS INDEX (BMI) OF 35.0 TO 35.9 IN ADULT: Primary | ICD-10-CM

## 2024-12-12 PROCEDURE — 3078F DIAST BP <80 MM HG: CPT | Performed by: NURSE PRACTITIONER

## 2024-12-12 PROCEDURE — 99214 OFFICE O/P EST MOD 30 MIN: CPT | Performed by: NURSE PRACTITIONER

## 2024-12-12 PROCEDURE — 3008F BODY MASS INDEX DOCD: CPT | Performed by: NURSE PRACTITIONER

## 2024-12-12 PROCEDURE — 3074F SYST BP LT 130 MM HG: CPT | Performed by: NURSE PRACTITIONER

## 2024-12-12 RX ORDER — PHENTERMINE HYDROCHLORIDE 15 MG/1
15 CAPSULE ORAL EVERY MORNING
Qty: 30 CAPSULE | Refills: 1 | Status: SHIPPED | OUTPATIENT
Start: 2024-12-12

## 2024-12-12 NOTE — ASSESSMENT & PLAN NOTE
Lost 3lbs.  She has taken Phentermine in the past on a high dose and had palpitations.  She would like to try it at a lower dose    Phentermine HCl 15 MG Oral Cap          Take 1 capsule (15 mg total) by mouth every morning., Normal, Disp-30 capsule, R-1

## 2024-12-12 NOTE — PROGRESS NOTES
HPI:    Patient ID: Vianey Cantu is a 49 year old female.    HPI Follow up on Obesity- BMI is 35.  49 year old female who I have seen in the past.  She is a patient of Dr. Eber Gonzalez and I am seeing her for weight loss.  She is following the Plant Protein Diet and has lost 3lbs in over a month.    Wt Readings from Last 6 Encounters:   12/12/24 187 lb 9.6 oz (85.1 kg)   10/31/24 190 lb 3.2 oz (86.3 kg)   10/10/24 189 lb (85.7 kg)   09/23/24 187 lb (84.8 kg)   09/04/24 188 lb 11.2 oz (85.6 kg)   08/23/24 185 lb (83.9 kg)         GI notes reviewed  --Final pathology from EGD with colonoscopy negative for malignancy on 11/11/2020     --Patient has monthly blood loss with menses as her only site of bleeding, not a vegetarian. Denies blood loss from any other orifice;had a positive fecal occult blood test last visit in 2/24  --she has symptomatic anemia reported as fatigue  --no other causes of anemia noted  --rec pRBC for hgb <7  --She was  treated with IV iron injectafer in Sept 2024          Immunization History   Administered Date(s) Administered    Covid-19 Vaccine Pfizer 30 mcg/0.3 ml 03/17/2021, 04/07/2021, 11/21/2021    FLU VAC QIV SPLIT 3 YRS AND OLDER (80609) 11/01/2017    FLULAVAL 6 months & older 0.5 ml Prefilled syringe (79427) 11/16/2017, 12/26/2019, 10/20/2020, 10/28/2021    FLUZONE 6 months and older PFS 0.5 ml (78924) 11/16/2017    Fluvirin, 3 Years & >, Im 10/15/2010    Influenza 10/03/2013    TDAP 04/17/2019       Past Medical History:    Abdominal pain in female    LLQ. Colonoscopy : NL (10-14-15) except hemorrhoids.     Abnormal Pap smear of cervix    CHAYITO 1    Amenorrhea    Anemia    Anxiety    Cyst of buttocks    cyst on left buttocks, removed    Cyst of ovary, right    Decorative tattoo    Elevated liver enzymes    Fatty liver.     Esophageal reflux    S/P Lap. Nissen fundoplication.    Hiatal hernia    High blood pressure    High cholesterol    Incontinence    Umbilical hernia      Past  Surgical History:   Procedure Laterality Date    Bravo 96hr - internal  2013    [place]ter 30 1st 48, 16 2nd 48          x 3      2006    Cholecystectomy      Colonoscopy  2020    Colonoscopy      Colonoscopy      Colonoscopy N/A 3/11/2024    Procedure: COLONOSCOPY;  Surgeon: Tyrel Thurston MD;  Location: [facility] ENDO    Egd  2013    Egd  2020    Egd      Esophageal manometry - internal  2013    normal    Excis primary ganglion wrist Left     wrist x2    Forearm/wrist surgery unlisted Left     wrist tendon surgery    Hemorrhoidectomy  2015    internal and anal skin tag removeal. Benign.     Hernia surgery      Laparoscopic fundoplasty  2013    hiatal hernia     Daniel localization wire 1 site left (cpt=19281) Left 2017    sebaceous cyst    Other surgical history      Lasik    Other surgical history Left     ankle tendon repair    Tubal ligation        Social History     Socioeconomic History    Marital status:    Occupational History    Occupation: Retired. Was dental ass't.    Tobacco Use    Smoking status: Never    Smokeless tobacco: Never   Vaping Use    Vaping status: Never Used   Substance and Sexual Activity    Alcohol use: No    Drug use: Never    Sexual activity: Yes     Partners: Male     Birth control/protection: Tubal Ligation   Other Topics Concern    Caffeine Concern No    Exercise Yes     Comment: walks for 20 minutes, 3 times per week.          Review of Systems   Constitutional:  Negative for chills, fatigue and fever.        Would like to loss more weight   HENT:  Negative for ear pain, hearing loss, sinus pain, sore throat and trouble swallowing.    Eyes:  Negative for pain and visual disturbance.   Respiratory:  Negative for cough, chest tightness and shortness of breath.    Cardiovascular:  Negative for chest pain, palpitations and leg swelling.   Gastrointestinal:  Negative for abdominal pain, constipation, diarrhea, nausea and  vomiting.   Endocrine: Negative for cold intolerance and heat intolerance.   Genitourinary:  Negative for dysuria and hematuria.   Musculoskeletal:  Negative for back pain and joint swelling.   Skin:  Negative for rash.   Allergic/Immunologic: Negative for environmental allergies.   Neurological:  Negative for weakness, numbness and headaches.   Hematological:  Does not bruise/bleed easily.   Psychiatric/Behavioral:  Negative for dysphoric mood and sleep disturbance. The patient is not nervous/anxious.               Current Outpatient Medications   Medication Sig Dispense Refill    Phentermine HCl 15 MG Oral Cap Take 1 capsule (15 mg total) by mouth every morning. 30 capsule 1    Omeprazole 40 MG Oral Capsule Delayed Release Take 1 capsule (40 mg total) by mouth every morning before breakfast. 30 capsule 0    oxybutynin ER 10 MG Oral Tablet 24 Hr Take 1 tablet (10 mg total) by mouth daily. 90 tablet 3    metoprolol succinate ER 25 MG Oral Tablet 24 Hr Take 0.5 tablets (12.5 mg total) by mouth daily. 45 tablet 3    simvastatin 20 MG Oral Tab Take 1 tablet (20 mg total) by mouth nightly. 90 tablet 3    saccharomyces boulardii (FLORASTOR) 250 MG Oral Cap Take 1 capsule (250 mg total) by mouth 2 (two) times daily. 90 capsule 2    FLUoxetine 20 MG Oral Cap Take 1 capsule (20 mg total) by mouth daily. (Patient not taking: Reported on 12/12/2024) 90 capsule 1     Allergies:Allergies[1]   PHYSICAL EXAM:   Physical Exam  Constitutional:       Appearance: Normal appearance. She is well-developed.   HENT:      Head: Normocephalic.      Right Ear: Tympanic membrane normal.      Left Ear: Tympanic membrane normal.      Nose: Nose normal.      Mouth/Throat:      Mouth: Mucous membranes are moist.      Pharynx: No oropharyngeal exudate or posterior oropharyngeal erythema.   Eyes:      General:         Right eye: No discharge.         Left eye: No discharge.      Pupils: Pupils are equal, round, and reactive to light.    Cardiovascular:      Rate and Rhythm: Normal rate and regular rhythm.      Heart sounds: Normal heart sounds. No murmur heard.     No friction rub. No gallop.   Pulmonary:      Effort: Pulmonary effort is normal. No respiratory distress.      Breath sounds: Normal breath sounds. No wheezing, rhonchi or rales.   Abdominal:      General: Bowel sounds are normal. There is no distension.      Palpations: Abdomen is soft. There is no mass.      Tenderness: There is no abdominal tenderness. There is no right CVA tenderness, left CVA tenderness or guarding.   Musculoskeletal:         General: No tenderness.      Cervical back: Normal range of motion and neck supple. No tenderness.      Right lower leg: No edema.      Left lower leg: No edema.   Lymphadenopathy:      Cervical: No cervical adenopathy.   Skin:     General: Skin is warm and dry.      Findings: No rash.   Neurological:      Mental Status: She is alert and oriented to person, place, and time.      Coordination: Coordination normal.      Gait: Gait normal.   Psychiatric:         Mood and Affect: Mood normal.         Behavior: Behavior normal.         Thought Content: Thought content normal.         Judgment: Judgment normal.       /60 (BP Location: Right arm, Patient Position: Sitting, Cuff Size: adult)   Pulse 62   Ht 5' 1\" (1.549 m)   Wt 187 lb 9.6 oz (85.1 kg)   LMP 11/19/2024 (Approximate)   BMI 35.45 kg/m²   Wt Readings from Last 2 Encounters:   12/12/24 187 lb 9.6 oz (85.1 kg)   10/31/24 190 lb 3.2 oz (86.3 kg)     Body mass index is 35.45 kg/m².(2)  Lab Results   Component Value Date    WBC 7.9 10/12/2024    RBC 5.17 10/12/2024    HGB 14.2 10/12/2024    HCT 42.1 10/12/2024    MCV 81.4 10/12/2024    MCH 27.5 10/12/2024    MCHC 33.7 10/12/2024    RDW 16.6 (H) 10/12/2024    .0 10/12/2024    MPV 7.9 09/22/2018      Lab Results   Component Value Date    GLU 99 07/11/2024    BUN 27 (H) 07/11/2024    BUNCREA 30.3 (H) 07/11/2024    CREATSERUM  0.89 07/11/2024    ANIONGAP 8 07/11/2024    GFR >60 02/17/2016    GFRNAA 101 07/23/2022    GFRAA 117 07/23/2022    CA 10.2 07/11/2024    OSMOCALC 299 (H) 07/11/2024    ALKPHO 72 07/11/2024    AST 19 07/11/2024    ALT 27 07/11/2024    ALKPHOS 65 06/16/2016    BILT 0.4 07/11/2024    TP 7.7 07/11/2024    ALB 5.0 (H) 07/11/2024    GLOBULIN 2.7 07/11/2024    AGRATIO 1.4 06/16/2016     07/11/2024    K 4.2 07/11/2024     07/11/2024    CO2 27.0 07/11/2024      Lab Results   Component Value Date     (H) 06/27/2023    A1C 6.1 (H) 06/27/2023      Lab Results   Component Value Date    CHOLEST 148 07/11/2024    TRIG 74 07/11/2024    HDL 50 07/11/2024    LDL 83 07/11/2024    VLDL 12 07/11/2024    NONHDLC 98 07/11/2024    CALCNONHDL 134 (H) 07/01/2015      Lab Results   Component Value Date    T4F 0.80 02/15/2013    TSH 0.702 07/11/2024                ASSESSMENT/PLAN:     Problem List Items Addressed This Visit       Class 2 obesity due to excess calories with body mass index (BMI) of 35.0 to 35.9 in adult - Primary     Lost 3lbs.  She has taken Phentermine in the past on a high dose and had palpitations.  She would like to try it at a lower dose    Phentermine HCl 15 MG Oral Cap          Take 1 capsule (15 mg total) by mouth every morning., Normal, Disp-30 capsule, R-1            Relevant Medications    Phentermine HCl 15 MG Oral Cap          No orders of the defined types were placed in this encounter.      Meds This Visit:  Requested Prescriptions     Signed Prescriptions Disp Refills    Phentermine HCl 15 MG Oral Cap 30 capsule 1     Sig: Take 1 capsule (15 mg total) by mouth every morning.       Imaging & Referrals:  None         JERZY Galvan          [1] No Known Allergies

## 2025-01-18 ENCOUNTER — APPOINTMENT (OUTPATIENT)
Dept: CT IMAGING | Facility: HOSPITAL | Age: 50
End: 2025-01-18
Attending: NURSE PRACTITIONER
Payer: COMMERCIAL

## 2025-01-18 ENCOUNTER — HOSPITAL ENCOUNTER (OUTPATIENT)
Age: 50
Discharge: HOME | End: 2025-01-18
Payer: COMMERCIAL

## 2025-01-18 VITALS
DIASTOLIC BLOOD PRESSURE: 73 MMHG | RESPIRATION RATE: 18 BRPM | OXYGEN SATURATION: 99 % | SYSTOLIC BLOOD PRESSURE: 131 MMHG | TEMPERATURE: 98 F | HEART RATE: 61 BPM

## 2025-01-18 DIAGNOSIS — N30.01 ACUTE CYSTITIS WITH HEMATURIA: Primary | ICD-10-CM

## 2025-01-18 DIAGNOSIS — B96.89 BV (BACTERIAL VAGINOSIS): ICD-10-CM

## 2025-01-18 DIAGNOSIS — N76.0 BV (BACTERIAL VAGINOSIS): ICD-10-CM

## 2025-01-18 LAB
B-HCG UR QL: NEGATIVE
BASOPHILS # BLD AUTO: 0.07 X10(3) UL (ref 0–0.2)
BASOPHILS NFR BLD AUTO: 0.8 %
BILIRUB UR QL STRIP: NEGATIVE
BUN BLD-MCNC: 19 MG/DL (ref 7–18)
CHLORIDE BLD-SCNC: 105 MMOL/L (ref 98–112)
CLARITY UR: CLEAR
CO2 BLD-SCNC: 26 MMOL/L (ref 21–32)
COLOR UR: YELLOW
CREAT BLD-MCNC: 0.7 MG/DL
DEPRECATED RDW RBC AUTO: 41.9 FL (ref 35.1–46.3)
EGFRCR SERPLBLD CKD-EPI 2021: 107 ML/MIN/1.73M2 (ref 60–?)
EOSINOPHIL # BLD AUTO: 0.04 X10(3) UL (ref 0–0.7)
EOSINOPHIL NFR BLD AUTO: 0.5 %
ERYTHROCYTE [DISTWIDTH] IN BLOOD BY AUTOMATED COUNT: 13.6 % (ref 11–15)
GLUCOSE BLD-MCNC: 100 MG/DL (ref 70–99)
GLUCOSE UR STRIP-MCNC: NEGATIVE MG/DL
HCT VFR BLD AUTO: 41.8 %
HCT VFR BLD AUTO: 41.8 %
HCT VFR BLD CALC: 45 %
HGB BLD-MCNC: 13.6 G/DL
HGB BLD-MCNC: 14.4 G/DL
IMM GRANULOCYTES # BLD AUTO: 0.14 X10(3) UL (ref 0–1)
IMM GRANULOCYTES NFR BLD: 1.6 %
ISTAT IONIZED CALCIUM FOR CHEM 8: 1.19 MMOL/L (ref 1.12–1.32)
KETONES UR STRIP-MCNC: NEGATIVE MG/DL
LEUKOCYTE ESTERASE UR QL STRIP: NEGATIVE
LYMPHOCYTES # BLD AUTO: 1.97 X10(3) UL (ref 1–4)
LYMPHOCYTES NFR BLD AUTO: 23 %
MCH RBC QN AUTO: 27.5 PG (ref 26–34)
MCH RBC QN AUTO: 29.6 PG (ref 26–34)
MCHC RBC AUTO-ENTMCNC: 32.5 G/DL (ref 31–37)
MCHC RBC AUTO-ENTMCNC: 34.4 G/DL (ref 31–37)
MCV RBC AUTO: 84.4 FL (ref 80–100)
MCV RBC AUTO: 85.8 FL
MONOCYTES # BLD AUTO: 0.6 X10(3) UL (ref 0.1–1)
MONOCYTES NFR BLD AUTO: 7 %
NEUTROPHILS # BLD AUTO: 5.75 X10 (3) UL (ref 1.5–7.7)
NEUTROPHILS # BLD AUTO: 5.75 X10(3) UL (ref 1.5–7.7)
NEUTROPHILS NFR BLD AUTO: 67.1 %
NITRITE UR QL STRIP: NEGATIVE
PH UR STRIP: 5.5 [PH]
PLATELET # BLD AUTO: 311 10(3)UL (ref 150–450)
PLATELET # BLD AUTO: 318 X10ˆ3/UL (ref 150–450)
POTASSIUM BLD-SCNC: 4.1 MMOL/L (ref 3.6–5.1)
PROT UR STRIP-MCNC: NEGATIVE MG/DL
RBC # BLD AUTO: 4.87 X10(6)UL
RBC # BLD AUTO: 4.95 X10ˆ6/UL
SODIUM BLD-SCNC: 141 MMOL/L (ref 136–145)
SP GR UR STRIP: 1.02
UROBILINOGEN UR STRIP-ACNC: <2 MG/DL
WBC # BLD AUTO: 8.4 X10ˆ3/UL (ref 4–11)
WBC # BLD AUTO: 8.6 X10(3) UL (ref 4–11)

## 2025-01-18 PROCEDURE — 81025 URINE PREGNANCY TEST: CPT | Performed by: NURSE PRACTITIONER

## 2025-01-18 PROCEDURE — 87591 N.GONORRHOEAE DNA AMP PROB: CPT | Performed by: NURSE PRACTITIONER

## 2025-01-18 PROCEDURE — 99214 OFFICE O/P EST MOD 30 MIN: CPT | Performed by: NURSE PRACTITIONER

## 2025-01-18 PROCEDURE — 87088 URINE BACTERIA CULTURE: CPT | Performed by: NURSE PRACTITIONER

## 2025-01-18 PROCEDURE — 87491 CHLMYD TRACH DNA AMP PROBE: CPT | Performed by: NURSE PRACTITIONER

## 2025-01-18 PROCEDURE — 80047 BASIC METABLC PNL IONIZED CA: CPT | Performed by: NURSE PRACTITIONER

## 2025-01-18 PROCEDURE — 87086 URINE CULTURE/COLONY COUNT: CPT | Performed by: NURSE PRACTITIONER

## 2025-01-18 PROCEDURE — 81002 URINALYSIS NONAUTO W/O SCOPE: CPT | Performed by: NURSE PRACTITIONER

## 2025-01-18 PROCEDURE — 87186 SC STD MICRODIL/AGAR DIL: CPT | Performed by: NURSE PRACTITIONER

## 2025-01-18 PROCEDURE — 81514 NFCT DS BV&VAGINITIS DNA ALG: CPT | Performed by: NURSE PRACTITIONER

## 2025-01-18 PROCEDURE — 85025 COMPLETE CBC W/AUTO DIFF WBC: CPT | Performed by: NURSE PRACTITIONER

## 2025-01-18 PROCEDURE — 74177 CT ABD & PELVIS W/CONTRAST: CPT | Performed by: NURSE PRACTITIONER

## 2025-01-18 NOTE — ED PROVIDER NOTES
Patient Seen in: [facility]      History     Chief Complaint   Patient presents with    Urinary Symptoms     Stated Complaint: eval-g    Subjective:   HPI      48yo female p/w lower abdominal pain, stress inconitnence (different than her baseline), lower back pain. No gross hematuria. Denies any falls/redness/warmth/deformity/numbness/tingling/weakness sensation. No saddle anesthesia, red flag symptoms. No  hematuria/dysuria, vaginal pain/bleeding/discharge.      Objective:     Past Medical History:    Abdominal pain in female    LLQ. Colonoscopy : NL (10-14-15) except hemorrhoids.     Abnormal Pap smear of cervix    CHAYITO 1    Amenorrhea    Anemia    Anxiety    Cyst of buttocks    cyst on left buttocks, removed    Cyst of ovary, right    Decorative tattoo    Elevated liver enzymes    Fatty liver.     Esophageal reflux    S/P Lap. Nissen fundoplication.    Hiatal hernia    High blood pressure    High cholesterol    Incontinence    Umbilical hernia              Past Surgical History:   Procedure Laterality Date    Bravo 96hr - internal  2013    De[place]ter 30 1st 48, 16 2nd 48          x 3      2006    Cholecystectomy      Colonoscopy  2020    Colonoscopy      Colonoscopy      Colonoscopy N/A 3/11/2024    Procedure: COLONOSCOPY;  Surgeon: Tyrel Thurston MD;  Location: [facility] ENDO    Egd  2013    Egd  2020    Egd      Esophageal manometry - internal  2013    normal    Excis primary ganglion wrist Left     wrist x2    Forearm/wrist surgery unlisted Left     wrist tendon surgery    Hemorrhoidectomy  2015    internal and anal skin tag removeal. Benign.     Hernia surgery      Laparoscopic fundoplasty  2013    hiatal hernia     Daniel localization wire 1 site left (cpt=19281) Left 2017    sebaceous cyst    Other surgical history      Lasik    Other surgical history Left     ankle tendon repair    Tubal ligation                  Social History      Socioeconomic History    Marital status:    Occupational History    Occupation: Retired. Was dental ass't.    Tobacco Use    Smoking status: Never    Smokeless tobacco: Never   Vaping Use    Vaping status: Never Used   Substance and Sexual Activity    Alcohol use: No    Drug use: Never    Sexual activity: Yes     Partners: Male     Birth control/protection: Tubal Ligation   Other Topics Concern    Caffeine Concern No    Exercise Yes     Comment: walks for 20 minutes, 3 times per week.   Social History Narrative    Noreen is  x 18yrs. She has 3 children. 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 Noreen lives with her family in [place], [place].               Review of Systems    Positive for stated complaint: eval-g  Other systems are as noted in HPI.  Constitutional and vital signs reviewed.      All other systems reviewed and negative except as noted above.    Physical Exam     ED Triage Vitals [01/18/25 1049]   /73   Pulse 61   Resp 18   Temp 98.1 °F (36.7 °C)   Temp src Oral   SpO2 99 %   O2 Device None (Room air)       Current Vitals:   Vital Signs  BP: 131/73  Pulse: 61  Resp: 18  Temp: 98.1 °F (36.7 °C)  Temp src: Oral    Oxygen Therapy  SpO2: 99 %  O2 Device: None (Room air)        Physical Exam  Vitals and nursing note reviewed.   Constitutional:       General: She is not in acute distress.     Appearance: She is not toxic-appearing.   HENT:      Head: Normocephalic.      Nose: Nose normal. No congestion or rhinorrhea.      Mouth/Throat:      Mouth: Mucous membranes are moist.   Pulmonary:      Effort: Pulmonary effort is normal. No respiratory distress.   Abdominal:      General: Abdomen is flat. Bowel sounds are normal.      Tenderness: There is abdominal tenderness in the right lower quadrant, suprapubic area and left lower quadrant.   Genitourinary:     General: Normal vulva.      Vagina: No vaginal discharge.      Rectum: Normal.   Musculoskeletal:         General: Normal range of motion.       Cervical back: Normal range of motion.   Skin:     General: Skin is warm and dry.      Capillary Refill: Capillary refill takes less than 2 seconds.   Neurological:      General: No focal deficit present.      Mental Status: She is alert.             ED Course     Labs Reviewed   [facility] POCT URINALYSIS DIPSTICK - Abnormal; Notable for the following components:       Result Value    Blood, Urine Trace-Intact (*)     All other components within normal limits   POCT ISTAT CHEM8 CARTRIDGE - Abnormal; Notable for the following components:    ISTAT BUN 19 (*)     ISTAT Glucose 100 (*)     All other components within normal limits   POCT PREGNANCY URINE - Normal   CBC W AUTO DIFF   POCT CBC   VAGINITIS VAGINOSIS PCR PANEL   CHLAMYDIA/GONOCOCCUS, АЛЕКСАНДР   URINE CULTURE, ROUTINE       ED Course as of 01/18/25 1547  ------------------------------------------------------------  Time: 01/18 1335  Value: CT ABDOMEN+PELVIS(CONTRAST ONLY)(CPT=74177)  Comment: 1. No acute intra-abdominal process is identified. The etiology of the patient's symptoms is unclear from this study.      2. Left adnexal cystic lesions may be functional/physiologic.      3. Possible uterine fibroid.      4. Status post cholecystectomy without significant hepatobiliary dilatation.      5. Right adnexal surgical clips.      6. Small hiatal hernia with postprocedural changes in the region of the gastroesophageal junction, perhaps related to prior fundoplasty.      7. Lesser incidental findings as above.                 MDM             Medical Decision Making  50yo female p/w lower abdominal pain, lower back pain, stress incontinence.      exam w/o CMT, adenexal tenderness, nor vaginal discharge, blood.   Abdominal pain is more located to her suprapubic area bilaterally than adnexal. Has b/l back pain. Will send for CT abd/pelvis. May consider pyleo, kidney stone, infected kidney stone,     CBC>No elevation in wbc, no left shift, no anemias  Clin chem>No  electrolyte imbalance    U/a>+trace intact blood otherwise normal  Urine Preg>negative    Pending vaginitis PCR and GC swab. Urine Cx    CT scan>1. No acute intra-abdominal process is identified. The etiology of the patient's symptoms is unclear from this study.      2. Left adnexal cystic lesions may be functional/physiologic.      3. Possible uterine fibroid.      4. Status post cholecystectomy without significant hepatobiliary dilatation.      5. Right adnexal surgical clips.      6. Small hiatal hernia with postprocedural changes in the region of the gastroesophageal junction, perhaps related to prior fundoplasty.      7. Lesser incidental findings as above.     Discussed with patient as these findings are not indicative of recurrent pain will need to follow-up with PCP.  No current recommendations for antibiotics at this time.  Does not appear to be a UTI, pyelonephritis, nephrolithiasis as we previously discussed.  Does has a small uterine fibroid and small left adnexal cyst of unknown clinical significance.  Patient agreeable to discharge.  Will follow-up with PCP.  PCP is treating her for stress incontinence with medications we will discuss this with her PCP.  No red flag symptoms for her urinary incontinence.  Lower back pain does not suggest symptoms for cauda equina.  Does not have midline lower back pain.    Physical exam remained stable over serial reexaminations as previously documented. External chart review completed.     I have discussed with the patient the results of tests, differential diagnosis, and warning signs and symptoms that should prompt immediate return.  The patient understands these instructions and agrees to the follow-up plan provided.  There are no barriers to learning.   Appropriate f/u given.  Patient agrees to return for any concerns/problems/complications.    Differential diagnosis reflecting the complexity of care include: See above    Comorbidities that add complexity to  management include: Stress incontinence    External chart review was done and was noted:Yes    History obtained by an independent source was from: Patient    Discussions of management was done with:Patient    My independent interpretation of studies of: N/A    Diagnostic tests and medications considered but not ordered were: N/A    Social determinants of health that affect care:NA    Shared decision making was done by Self, Patient              Disposition and Plan     Clinical Impression:  1. Sensation of pressure in bladder area    2. Lower abdominal pain         Disposition:  Discharge  1/18/2025  1:35 pm    Follow-up:  Eber Gonzalez MD  [address] [address]  [phone]                Medications Prescribed:  Discharge Medication List as of 1/18/2025  1:40 PM              Supplementary Documentation:                                                            Trilobed Flap Text: The defect edges were debeveled with a #15 scalpel blade.  Given the location of the defect and the proximity to free margins a trilobed flap was deemed most appropriate.  Using a sterile surgical marker, an appropriate trilobed flap drawn around the defect.    The area thus outlined was incised deep to adipose tissue with a #15 scalpel blade.  The skin margins were undermined to an appropriate distance in all directions utilizing iris scissors.

## 2025-01-18 NOTE — DISCHARGE INSTRUCTIONS
Please follow up with your primary care doctor for further concerns.    REFER TO HANDOUT FOR FURTHER DISCHARGE CARE.  TAKE MEDICATIONS AS PRESCRIBED.    RETURN TO EMERGENCY DEPARTMENT IF WORSENING SYMPTOMS, INCREASED  ABDOMINAL PAIN, VOMITING PERSISTS, OR ANY OTHER CONCERNS:    Severe pain in abdomen  Pain gets worse or moves to the right lower abdomen  New or worsening vomiting or diarrhea  Swelling of the abdomen  Unable to pass stool for more than three days  Fever of 100.4ºF (38ºC) or higher, or as directed by your healthcare provider.  Blood in vomit or bowel movements (dark red or black color)  Jaundice (yellow color of eyes and skin)  Weakness, dizziness or fainting  Chest, arm, back, neck or jaw pain  Unexpected vaginal bleeding or missed period

## 2025-01-19 LAB
BV BACTERIA DNA VAG QL NAA+PROBE: POSITIVE
C GLABRATA DNA VAG QL NAA+PROBE: NEGATIVE
C KRUSEI DNA VAG QL NAA+PROBE: NEGATIVE
CANDIDA DNA VAG QL NAA+PROBE: NEGATIVE
T VAGINALIS DNA VAG QL NAA+PROBE: NEGATIVE

## 2025-01-19 RX ORDER — CEPHALEXIN 500 MG/1
500 CAPSULE ORAL 3 TIMES DAILY
Qty: 21 CAPSULE | Refills: 0 | Status: SHIPPED | OUTPATIENT
Start: 2025-01-19 | End: 2025-01-26

## 2025-01-19 RX ORDER — METRONIDAZOLE 500 MG/1
500 TABLET ORAL 3 TIMES DAILY
Qty: 21 TABLET | Refills: 0 | Status: SHIPPED | OUTPATIENT
Start: 2025-01-19 | End: 2025-01-26

## 2025-01-19 NOTE — ED PROVIDER NOTES
Patient BV growth with Flagyl added to pharmacy.  Patient urinalysis with E. coli growth 50,000 cell count without suitability panel officially at this time.  Will provide both Flagyl and cephalexin with instruction through nurse for probiotic.

## 2025-01-20 LAB
C TRACH DNA SPEC QL NAA+PROBE: NEGATIVE
N GONORRHOEA DNA SPEC QL NAA+PROBE: NEGATIVE

## 2025-01-30 ENCOUNTER — TELEPHONE (OUTPATIENT)
Age: 50
End: 2025-01-30

## 2025-01-30 NOTE — TELEPHONE ENCOUNTER
Called and unable to reach the patient. Left a VM reminding her to complete her labs before her upcoming appointment tomorrow, 1/31 at 3:30 pm. Told her if she could go to the [facility] lab location either today or tomorrow at least 1 hour before her appointment, that would be ideal. Told her if she's unable to complete her labs, then instructed her to call our office at [phone] to reschedule her appointment.

## 2025-01-31 ENCOUNTER — OFFICE VISIT (OUTPATIENT)
Age: 50
End: 2025-01-31
Attending: NURSE PRACTITIONER
Payer: COMMERCIAL

## 2025-01-31 ENCOUNTER — LAB ENCOUNTER (OUTPATIENT)
Dept: LAB | Facility: HOSPITAL | Age: 50
End: 2025-01-31
Attending: INTERNAL MEDICINE
Payer: COMMERCIAL

## 2025-01-31 VITALS
HEART RATE: 68 BPM | SYSTOLIC BLOOD PRESSURE: 129 MMHG | HEIGHT: 61 IN | DIASTOLIC BLOOD PRESSURE: 75 MMHG | TEMPERATURE: 98 F | WEIGHT: 188.19 LBS | OXYGEN SATURATION: 96 % | BODY MASS INDEX: 35.53 KG/M2 | RESPIRATION RATE: 16 BRPM

## 2025-01-31 DIAGNOSIS — D50.0 IRON DEFICIENCY ANEMIA DUE TO CHRONIC BLOOD LOSS: ICD-10-CM

## 2025-01-31 DIAGNOSIS — F41.9 ANXIETY: ICD-10-CM

## 2025-01-31 DIAGNOSIS — R74.8 ELEVATED LIVER ENZYMES: ICD-10-CM

## 2025-01-31 DIAGNOSIS — E61.1 IRON DEFICIENCY: Primary | ICD-10-CM

## 2025-01-31 DIAGNOSIS — K21.9 GASTROESOPHAGEAL REFLUX DISEASE WITHOUT ESOPHAGITIS: ICD-10-CM

## 2025-01-31 DIAGNOSIS — R53.83 OTHER FATIGUE: ICD-10-CM

## 2025-01-31 DIAGNOSIS — E61.1 IRON DEFICIENCY: ICD-10-CM

## 2025-01-31 LAB
ALBUMIN SERPL-MCNC: 4.6 G/DL (ref 3.2–4.8)
ALBUMIN/GLOB SERPL: 2.3 {RATIO} (ref 1–2)
ALP LIVER SERPL-CCNC: 68 U/L
ALT SERPL-CCNC: 26 U/L
ANION GAP SERPL CALC-SCNC: 10 MMOL/L (ref 0–18)
AST SERPL-CCNC: 18 U/L (ref ?–34)
BASOPHILS # BLD AUTO: 0.06 X10(3) UL (ref 0–0.2)
BASOPHILS NFR BLD AUTO: 1.1 %
BILIRUB SERPL-MCNC: 0.4 MG/DL (ref 0.3–1.2)
BUN BLD-MCNC: 23 MG/DL (ref 9–23)
BUN/CREAT SERPL: 31.1 (ref 10–20)
CALCIUM BLD-MCNC: 8.9 MG/DL (ref 8.7–10.4)
CHLORIDE SERPL-SCNC: 106 MMOL/L (ref 98–112)
CO2 SERPL-SCNC: 26 MMOL/L (ref 21–32)
CREAT BLD-MCNC: 0.74 MG/DL
DEPRECATED HBV CORE AB SER IA-ACNC: 33 NG/ML
DEPRECATED RDW RBC AUTO: 44.5 FL (ref 35.1–46.3)
EGFRCR SERPLBLD CKD-EPI 2021: 100 ML/MIN/1.73M2 (ref 60–?)
EOSINOPHIL # BLD AUTO: 0.02 X10(3) UL (ref 0–0.7)
EOSINOPHIL NFR BLD AUTO: 0.4 %
ERYTHROCYTE [DISTWIDTH] IN BLOOD BY AUTOMATED COUNT: 14.2 % (ref 11–15)
GLOBULIN PLAS-MCNC: 2 G/DL (ref 2–3.5)
GLUCOSE BLD-MCNC: 99 MG/DL (ref 70–99)
HCT VFR BLD AUTO: 41.8 %
HGB BLD-MCNC: 14.1 G/DL
IMM GRANULOCYTES # BLD AUTO: 0.09 X10(3) UL (ref 0–1)
IMM GRANULOCYTES NFR BLD: 1.6 %
IRON SATN MFR SERPL: 17 %
IRON SERPL-MCNC: 51 UG/DL
LYMPHOCYTES # BLD AUTO: 1.55 X10(3) UL (ref 1–4)
LYMPHOCYTES NFR BLD AUTO: 27.1 %
MCH RBC QN AUTO: 29.1 PG (ref 26–34)
MCHC RBC AUTO-ENTMCNC: 33.7 G/DL (ref 31–37)
MCV RBC AUTO: 86.4 FL
MONOCYTES # BLD AUTO: 0.75 X10(3) UL (ref 0.1–1)
MONOCYTES NFR BLD AUTO: 13.1 %
NEUTROPHILS # BLD AUTO: 3.24 X10 (3) UL (ref 1.5–7.7)
NEUTROPHILS # BLD AUTO: 3.24 X10(3) UL (ref 1.5–7.7)
NEUTROPHILS NFR BLD AUTO: 56.7 %
OSMOLALITY SERPL CALC.SUM OF ELEC: 298 MOSM/KG (ref 275–295)
PLATELET # BLD AUTO: 229 10(3)UL (ref 150–450)
POTASSIUM SERPL-SCNC: 4.1 MMOL/L (ref 3.5–5.1)
PROT SERPL-MCNC: 6.6 G/DL (ref 5.7–8.2)
RBC # BLD AUTO: 4.84 X10(6)UL
SODIUM SERPL-SCNC: 142 MMOL/L (ref 136–145)
TOTAL IRON BINDING CAPACITY: 302 UG/DL (ref 250–425)
TRANSFERRIN SERPL-MCNC: 241 MG/DL (ref 250–380)
WBC # BLD AUTO: 5.7 X10(3) UL (ref 4–11)

## 2025-01-31 PROCEDURE — 82728 ASSAY OF FERRITIN: CPT

## 2025-01-31 PROCEDURE — 36415 COLL VENOUS BLD VENIPUNCTURE: CPT

## 2025-01-31 PROCEDURE — 84466 ASSAY OF TRANSFERRIN: CPT

## 2025-01-31 PROCEDURE — 85025 COMPLETE CBC W/AUTO DIFF WBC: CPT

## 2025-01-31 PROCEDURE — 80053 COMPREHEN METABOLIC PANEL: CPT

## 2025-01-31 PROCEDURE — 83540 ASSAY OF IRON: CPT

## 2025-01-31 NOTE — PROGRESS NOTES
Hematology Progress Note    Patient Name: Vianey Cantu   YOB: 1975   Medical Record Number: [id number]   CSN: [id number]   Consulting Physician: Darrion Izaguirre MD  Referring Physician(s): Eber Gonzalez MD  Date of Visit: 10/31/24    Chief complaint:  Iron deficiency anemia    History of Present Illness:     Vianey Cantu is a 49 year old female that was seen today in the hematology suite for evaluation of the above condition. Patient with PMH relevant for anxiety that causes sweating day and night presenting for evaluation of lowered iron levels but no signs of anemia.  Patient reports menstrual blood loss is her only site of bleeding.  She reports her cycles last once a month, not associated with heavy flow. Noreen denies any epistaxis, oral pharyngeal bleeding, melena, hematochezia, hematuria.  This patient does not have symptomatic anemia as she denies symptoms of fatigue, chest pain, dyspnea, lightheadedness or dizziness.  Her review of systems is notable for swelling during the day at night as well as alternating constipation with diarrhea.  Patient mentions that oral iron has caused constipation in the past and she did not like the taste of the pills. ROS is otherwise negative.    9/2024 GI workup colonoscopy and endoscopy negative plus capsule endoscopy inconclusive.      Interval History:  The patient returns for planned follow-up based on hx of iron deficiency anemia.  Noreen reports that her menstrual cycle blood loss is minimal.  Last injectafer was 9/6/24. She reports started phentermine for wt loss. Wt stable at this time. She reports anxiety related to father passed from liver cancer, history is noted in family history. She reports recent UTI, treated, now asymptomatic. Reports mild fatigue.  No signs of bleeding, periods are light. Denies pica, sob, chest pain, dizziness, diarrhea, constipation, or bleeding from any orifice. Review of systems is negative for any new concerns.  Ferritin  low will order IV iron, occult blood and patient to follow up with GI.        Past Medical History:  Past Medical History:    Abdominal pain in female    LLQ. Colonoscopy : NL (10-14-15) except hemorrhoids.     Abnormal Pap smear of cervix    CHAYITO 1    Amenorrhea    Anemia    Anxiety    Cyst of buttocks    cyst on left buttocks, removed    Cyst of ovary, right    Decorative tattoo    Elevated liver enzymes    Fatty liver.     Esophageal reflux    S/P Lap. Nissen fundoplication.    Hiatal hernia    High blood pressure    High cholesterol    Incontinence    Umbilical hernia       Past Surgical History:  Past Surgical History:   Procedure Laterality Date    Bravo 96hr - internal  2013    [place] 30 1st 48, 16 2nd 48          x 3      2006    Cholecystectomy      Colonoscopy  2020    Colonoscopy      Colonoscopy      Colonoscopy N/A 3/11/2024    Procedure: COLONOSCOPY;  Surgeon: Tyrel Thurston MD;  Location: [facility] ENDO    Egd  2013    Egd  2020    Egd      Esophageal manometry - internal  2013    normal    Excis primary ganglion wrist Left     wrist x2    Forearm/wrist surgery unlisted Left     wrist tendon surgery    Hemorrhoidectomy  2015    internal and anal skin tag removeal. Benign.     Hernia surgery      Laparoscopic fundoplasty  2013    hiatal hernia     Daniel localization wire 1 site left (cpt=19281) Left 2017    sebaceous cyst    Other surgical history      Lasik    Other surgical history Left     ankle tendon repair    Tubal ligation         Family Medical History:  Family History   Problem Relation Age of Onset    Cancer Mother 54        breast cancer     Breast Cancer Mother 54    Cancer Maternal Uncle         melanoma    Diabetes Maternal Grandmother     Stroke Maternal Grandfather     Lipids Father         hyperlipidemia    Diabetes Father     Other (NSVT) Father     Cancer Father         Liver cancer    Bleeding Disorders Neg     Clotting  Disorder Neg        Social History:  Social History     Socioeconomic History    Marital status:      Spouse name: Not on file    Number of children: Not on file    Years of education: Not on file    Highest education level: Not on file   Occupational History    Occupation: Retired. Was dental ass't.    Tobacco Use    Smoking status: Never    Smokeless tobacco: Never   Vaping Use    Vaping status: Never Used   Substance and Sexual Activity    Alcohol use: No    Drug use: Never    Sexual activity: Yes     Partners: Male     Birth control/protection: Tubal Ligation   Other Topics Concern     Service Not Asked    Blood Transfusions Not Asked    Caffeine Concern No    Occupational Exposure Not Asked    Hobby Hazards Not Asked    Sleep Concern Not Asked    Stress Concern Not Asked    Weight Concern Not Asked    Special Diet Not Asked    Back Care Not Asked    Exercise Yes     Comment: walks for 20 minutes, 3 times per week.    Bike Helmet Not Asked    Seat Belt Not Asked    Self-Exams Not Asked   Social History Narrative    Noreen is  x 18yrs. She has 3 children. 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 Noreen lives with her family in [place], [place].      Social Drivers of Health     Financial Resource Strain: Not on file   Food Insecurity: Not on file   Transportation Needs: Not on file   Physical Activity: Not on file   Stress: Not on file   Social Connections: Not on file   Housing Stability: Not on file       Allergies:   No Known Allergies    Current Medications:   Phentermine HCl 15 MG Oral Cap Take 1 capsule (15 mg total) by mouth every morning. 30 capsule 1    Omeprazole 40 MG Oral Capsule Delayed Release Take 1 capsule (40 mg total) by mouth every morning before breakfast. 30 capsule 0    oxybutynin ER 10 MG Oral Tablet 24 Hr Take 1 tablet (10 mg total) by mouth daily. 90 tablet 3    metoprolol succinate ER 25 MG Oral Tablet 24 Hr Take 0.5 tablets (12.5 mg total) by mouth daily. 45 tablet 3     simvastatin 20 MG Oral Tab Take 1 tablet (20 mg total) by mouth nightly. 90 tablet 3    saccharomyces boulardii (FLORASTOR) 250 MG Oral Cap Take 1 capsule (250 mg total) by mouth 2 (two) times daily. 90 capsule 2       Review of Systems:    Constitutional: Negative for anorexia, chills, fevers, negative intermittent night sweats, decreased appetite, +mild  fatigue  Eyes: Negative for visual disturbance, irritation and redness.  Respiratory: Negative for cough, hemoptysis, chest pain, or dyspnea on exertion.  Gastrointestinal: Negative for dysphagia, odynophagia, reflux symptoms, nausea, vomiting, change in bowel habits, or abdominal pain   Integument/breast: Negative for rash, skin lesions, and pruritus.  Hematologic/lymphatic: Negative for easy bruising, bleeding, and lymphadenopathy.  Musculoskeletal: Negative for myalgias, arthralgias, muscle weakness.  Neurological: Negative for headaches, dizziness, speech problems, gait problems and weakness.    A comprehensive 14 point review of systems was completed.  Pertinent positives and negatives noted in the the HPI.     Vital Signs:  /75 (BP Location: Left arm, Patient Position: Sitting, Cuff Size: adult)   Pulse 68   Temp 97.9 °F (36.6 °C) (Oral)   Resp 16   Ht 1.549 m (5' 1\")   Wt 85.4 kg (188 lb 3.2 oz)   LMP 01/08/2025 (Approximate)   SpO2 96%   BMI 35.56 kg/m²   Wt Readings from Last 6 Encounters:   01/31/25 85.4 kg (188 lb 3.2 oz)   12/12/24 85.1 kg (187 lb 9.6 oz)   10/31/24 86.3 kg (190 lb 3.2 oz)   10/10/24 85.7 kg (189 lb)   09/23/24 84.8 kg (187 lb)   09/04/24 85.6 kg (188 lb 11.2 oz)         Physical Examination:    General: Patient is alert and oriented x 3, not in acute distress.  Psych:  Friendly, cooperative with appropriate questions and responses  HEENT: EOMs intact. Oropharynx is clear.   Neck: No palpable lymphadenopathy. Neck is supple.  Lymphatics: There is no palpable lymphadenopathy throughout in the cervical, supraclavicular,  axillary, or inguinal regions.  Chest: Clear to auscultation. No wheezes or rales.  Heart: Regular rate and rhythm. S1S2 normal.  Abdomen: Soft, non tender with good bowel sounds.  No hepatosplenomegaly.  No palpable mass.  Extremities: No edema or calf tenderness.  Neurological: Grossly intact.      Labs:    Lab Results   Component Value Date/Time    WBC 5.7 01/31/2025 07:04 AM    RBC 4.84 01/31/2025 07:04 AM    HGB 14.1 01/31/2025 07:04 AM    HCT 41.8 01/31/2025 07:04 AM    MCV 86.4 01/31/2025 07:04 AM    MCH 29.1 01/31/2025 07:04 AM    MCHC 33.7 01/31/2025 07:04 AM    RDW 14.2 01/31/2025 07:04 AM    NEPRELIM 3.24 01/31/2025 07:04 AM    .0 01/31/2025 07:04 AM       Lab Results   Component Value Date/Time    GLU 99 01/31/2025 07:04 AM    BUN 23 01/31/2025 07:04 AM    CREATSERUM 0.74 01/31/2025 07:04 AM    GFRNAA 101 07/23/2022 11:13 AM    CA 8.9 01/31/2025 07:04 AM    ALB 4.6 01/31/2025 07:04 AM     01/31/2025 07:04 AM    K 4.1 01/31/2025 07:04 AM     01/31/2025 07:04 AM    CO2 26.0 01/31/2025 07:04 AM    ALKPHO 68 01/31/2025 07:04 AM    AST 18 01/31/2025 07:04 AM    ALT 26 01/31/2025 07:04 AM     Lab Results   Component Value Date    ISHAN 33 (L) 01/31/2025     Lab Results   Component Value Date    IRON 51 01/31/2025    TIBC 433 (H) 09/30/2015    IRONSAT 9 (L) 09/30/2015    TRANSFERRIN 241 (L) 01/31/2025    TIBCP 302 01/31/2025    SAT 17 01/31/2025        Impression:      ICD-10-CM    1. Iron deficiency  E61.1 Occult Blood, Fecal, FIT Immunoassay     Comp Metabolic Panel (14)      2. Iron deficiency anemia due to chronic blood loss  D50.0 Occult Blood, Fecal, FIT Immunoassay     Comp Metabolic Panel (14)      3. Elevated liver enzymes  R74.8 Comp Metabolic Panel (14)      4. Other fatigue  R53.83 Comp Metabolic Panel (14)      5. Gastroesophageal reflux disease without esophagitis  K21.9         Procedures/Imaging  9/27/24   Dr. Thurston: Video capsule endoscopy report: Incomplete study as the pill  camera did not reach the cecum. It reached the small bowel after 7 minutes and therefore this patient has slowed intestinal transit. However, based on this limited study there was no obvious source of anemia seen. No blood. No large AVMs.       -------------------------------------------------------------------------------------    49 year old W with PMH relevant for anxiety that causes sweating day and night presenting for follow up of iron deficiency anemia       Plan:    1.) Iron Deficiency Anemia    --prior endoscopy procedures with Dr. Lenard Harding  --Final pathology from EGD with colonoscopy negative for malignancy on 2020  --Patient has monthly blood loss with menses as her only site of bleeding, not a vegetarian. Denies blood loss from any other orifice;had a positive fecal occult blood test last visit in   --she has symptomatic anemia reported as fatigue  --no other causes of anemia noted  --She was  treated with IV iron injectafer in 2024     --It has been 4 months since last IV iron, she denies bleeding, diet changes,  or sob. She reports started phentermine to loose weight.  --Patient to follow up with GI, she reports capsule study was not completed due battery .  She does not want to repeat capsule study at this time. Dr. Thurston recommended Can consider CT enterography study in the future.   --rec pRBC for hgb <7  -Ferritin low ordered IV injectafer  -Ordered fecal occult  -Follow up 8 weeks after IV injectafer, with labs prior cbc, ferritin, iron tibc    2.) Fecal Occult blood test with loose stools w/ H.Pylori infection    --suspect this pt may be bleeding from the small bowel. She saw Tyrel Thurston soon in GI in 3/24 and found to have H.Pylori infection, now s/p triple therapy  --dicussed that post tx H.Pylori infection has resolved by stool testing in     3.) anxiety  She reports anxiety related to father passed from liver cancer, history is noted in family history.   Discussed  to continue follow up with PCP and continue cancer surveillance with mammogram and colonoscopies as recommended.    MDM: Mod Risk    JERZY Scales    [facility]  [address] [address]

## 2025-02-02 ENCOUNTER — HOSPITAL ENCOUNTER (OUTPATIENT)
Age: 50
Discharge: HOME | End: 2025-02-02
Payer: COMMERCIAL

## 2025-02-02 VITALS
SYSTOLIC BLOOD PRESSURE: 150 MMHG | TEMPERATURE: 98 F | DIASTOLIC BLOOD PRESSURE: 82 MMHG | RESPIRATION RATE: 18 BRPM | OXYGEN SATURATION: 100 % | HEART RATE: 67 BPM

## 2025-02-02 DIAGNOSIS — J10.1 INFLUENZA A: ICD-10-CM

## 2025-02-02 DIAGNOSIS — R05.1 ACUTE COUGH: ICD-10-CM

## 2025-02-02 DIAGNOSIS — B37.9 ANTIBIOTIC-INDUCED YEAST INFECTION: Primary | ICD-10-CM

## 2025-02-02 DIAGNOSIS — T36.95XA ANTIBIOTIC-INDUCED YEAST INFECTION: Primary | ICD-10-CM

## 2025-02-02 DIAGNOSIS — B37.31 VAGINAL YEAST INFECTION: ICD-10-CM

## 2025-02-02 LAB
B-HCG UR QL: NEGATIVE
BILIRUB UR QL STRIP: NEGATIVE
COLOR UR: YELLOW
GLUCOSE UR STRIP-MCNC: NEGATIVE MG/DL
KETONES UR STRIP-MCNC: NEGATIVE MG/DL
NITRITE UR QL STRIP: NEGATIVE
PH UR STRIP: 6 [PH]
POCT INFLUENZA A: POSITIVE
POCT INFLUENZA B: NEGATIVE
PROT UR STRIP-MCNC: NEGATIVE MG/DL
S PYO AG THROAT QL: NEGATIVE
SP GR UR STRIP: 1.02
UROBILINOGEN UR STRIP-ACNC: <2 MG/DL

## 2025-02-02 PROCEDURE — 81002 URINALYSIS NONAUTO W/O SCOPE: CPT | Performed by: PHYSICIAN ASSISTANT

## 2025-02-02 PROCEDURE — 87880 STREP A ASSAY W/OPTIC: CPT | Performed by: PHYSICIAN ASSISTANT

## 2025-02-02 PROCEDURE — 87502 INFLUENZA DNA AMP PROBE: CPT | Performed by: PHYSICIAN ASSISTANT

## 2025-02-02 PROCEDURE — 81025 URINE PREGNANCY TEST: CPT | Performed by: PHYSICIAN ASSISTANT

## 2025-02-02 PROCEDURE — 99214 OFFICE O/P EST MOD 30 MIN: CPT | Performed by: PHYSICIAN ASSISTANT

## 2025-02-02 RX ORDER — FLUCONAZOLE 150 MG/1
150 TABLET ORAL
Qty: 2 TABLET | Refills: 0 | Status: SHIPPED | OUTPATIENT
Start: 2025-02-02

## 2025-02-02 RX ORDER — IBUPROFEN 600 MG/1
600 TABLET, FILM COATED ORAL EVERY 8 HOURS PRN
Qty: 30 TABLET | Refills: 0 | Status: SHIPPED | OUTPATIENT
Start: 2025-02-02

## 2025-02-02 RX ORDER — BENZONATATE 100 MG/1
100 CAPSULE ORAL 3 TIMES DAILY PRN
Qty: 30 CAPSULE | Refills: 0 | Status: SHIPPED | OUTPATIENT
Start: 2025-02-02 | End: 2025-03-04

## 2025-02-02 RX ORDER — OSELTAMIVIR PHOSPHATE 75 MG/1
75 CAPSULE ORAL 2 TIMES DAILY
Qty: 10 CAPSULE | Refills: 0 | Status: SHIPPED | OUTPATIENT
Start: 2025-02-02

## 2025-02-02 NOTE — ED INITIAL ASSESSMENT (HPI)
Pt with sore throat, back pain, body aches, vaginal discharge, and vaginal itching yesterday. Pt denied fevers.

## 2025-02-02 NOTE — ED PROVIDER NOTES
Patient Seen in: [facility]      History     Chief Complaint   Patient presents with    Sore Throat    Eval-G     Stated Complaint: BACK PAIN, SORE THROAT    Subjective:   HPI    Patient is a 49-year-old female history below, presenting to immediate care for evaluation of several concerns.  She is concerned for vaginal yeast infection.  Onset of symptoms yesterday.  Symptoms feel similar to past.  She is endorsing vaginal irritation, itching, thick vaginal discharge white.  She was recently treated for urinary tract infection with oral antibiotics cephalexin.  She denies symptoms.  No abdominal, flank, pelvic pain.  No hematuria.  No vaginal bleeding.  In addition she is endorsing cold/flulike symptoms for the last several days.  Symptoms include subjective chills, body aches, nasal congestion, sore throat, nonproductive cough, generalized malaise, and low back pain.  Taking NyQuil for symptoms.  No recent travel or sick contacts.  Not immunocompromise.  No chest pain or shortness of breath.      Objective:     Past Medical History:    Abdominal pain in female    LLQ. Colonoscopy : NL (10-14-15) except hemorrhoids.     Abnormal Pap smear of cervix    CHAYITO 1    Amenorrhea    Anemia    Anxiety    Cyst of buttocks    cyst on left buttocks, removed    Cyst of ovary, right    Decorative tattoo    Elevated liver enzymes    Fatty liver.     Esophageal reflux    S/P Lap. Nissen fundoplication.    Hiatal hernia    High blood pressure    High cholesterol    Incontinence    Umbilical hernia              Past Surgical History:   Procedure Laterality Date    Bravo 96hr - internal  2013    [place] 30 1st 48, 16 2nd 48          x 3      2006    Cholecystectomy      Colonoscopy  2020    Colonoscopy      Colonoscopy      Colonoscopy N/A 3/11/2024    Procedure: COLONOSCOPY;  Surgeon: Tyrel Thurston MD;  Location: [facility] ENDO    Egd  2013    Egd  2020    Egd      Esophageal  manometry - internal  11/2013    normal    Excis primary ganglion wrist Left     wrist x2    Forearm/wrist surgery unlisted Left     wrist tendon surgery    Hemorrhoidectomy  11/2015    internal and anal skin tag removeal. Benign.     Hernia surgery      Laparoscopic fundoplasty  11/27/2013    hiatal hernia     Daniel localization wire 1 site left (cpt=19281) Left 07/2017    sebaceous cyst    Other surgical history      Lasik    Other surgical history Left     ankle tendon repair    Tubal ligation  2014                Social History     Socioeconomic History    Marital status:    Occupational History    Occupation: Retired. Was dental ass't.    Tobacco Use    Smoking status: Never    Smokeless tobacco: Never   Vaping Use    Vaping status: Never Used   Substance and Sexual Activity    Alcohol use: No    Drug use: Never    Sexual activity: Yes     Partners: Male     Birth control/protection: Tubal Ligation   Other Topics Concern    Caffeine Concern No    Exercise Yes     Comment: walks for 20 minutes, 3 times per week.   Social History Narrative    Noreen is  x 18yrs. She has 3 children. 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 Noreen lives with her family in [place], [place].               Review of Systems   Constitutional:  Negative for chills and fever.   HENT:  Positive for congestion and sore throat.    Respiratory:  Positive for cough. Negative for shortness of breath.    Cardiovascular:  Negative for chest pain.   Gastrointestinal:  Negative for abdominal pain, nausea and vomiting.   Genitourinary:  Positive for vaginal discharge. Negative for difficulty urinating, dysuria, flank pain, frequency, hematuria, pelvic pain, urgency, vaginal bleeding and vaginal pain.   Musculoskeletal:  Positive for back pain and myalgias. Negative for neck pain and neck stiffness.   Skin:  Negative for rash.   Allergic/Immunologic: Negative for immunocompromised state.   Neurological:  Positive for headaches. Negative for  dizziness, weakness and light-headedness.   Psychiatric/Behavioral:  Negative for confusion.    All other systems reviewed and are negative.      Positive for stated complaint: BACK PAIN, SORE THROAT  Other systems are as noted in HPI.  Constitutional and vital signs reviewed.      All other systems reviewed and negative except as noted above.    Physical Exam     ED Triage Vitals   BP 02/02/25 0841 150/82   Pulse 02/02/25 0841 67   Resp 02/02/25 0841 18   Temp 02/02/25 0841 98.3 °F (36.8 °C)   Temp src 02/02/25 0839 Oral   SpO2 02/02/25 0841 100 %   O2 Device 02/02/25 0839 None (Room air)       Current Vitals:   Vital Signs  BP: 150/82  Pulse: 67  Resp: 18  Temp: 98.3 °F (36.8 °C)  Temp src: Oral    Oxygen Therapy  SpO2: 100 %  O2 Device: None (Room air)        Physical Exam  Vitals and nursing note reviewed.   Constitutional:       General: She is not in acute distress.     Appearance: She is well-developed. She is not ill-appearing, toxic-appearing or diaphoretic.   HENT:      Head: Normocephalic and atraumatic.      Comments: Nasal congestion, postnasal     Mouth/Throat:      Mouth: Mucous membranes are moist.   Eyes:      General: No scleral icterus.  Cardiovascular:      Rate and Rhythm: Normal rate and regular rhythm.   Pulmonary:      Effort: Pulmonary effort is normal. No respiratory distress.      Breath sounds: Normal breath sounds.   Abdominal:      Palpations: Abdomen is soft.      Tenderness: There is no abdominal tenderness.      Comments: Soft nontender no guarding or rebound tenderness.  No flank or CVA tenderness   Musculoskeletal:         General: No tenderness or deformity. Normal range of motion.      Cervical back: Normal range of motion. No rigidity.      Comments: Tenderness to palpation paraspinal region right thoracic lumbar region.  Muscle tightness no spasm.  No midline spinal tenderness or step-offs   Skin:     Findings: No rash.   Neurological:      General: No focal deficit present.       Mental Status: She is alert and oriented to person, place, and time.      Motor: No weakness.   Psychiatric:         Mood and Affect: Mood normal.         Behavior: Behavior normal.           ED Course     Labs Reviewed   [facility] POCT URINALYSIS DIPSTICK - Abnormal; Notable for the following components:       Result Value    Urine Clarity Cloudy (*)     Blood, Urine Trace-Intact (*)     Leukocyte esterase urine Trace (*)     All other components within normal limits   POCT FLU TEST - Abnormal; Notable for the following components:    POCT INFLUENZA A Positive (*)     All other components within normal limits    Narrative:     This assay is a rapid molecular in vitro test utilizing nucleic acid amplification of influenza A and B viral RNA.   POCT PREGNANCY URINE - Normal   POCT RAPID STREP - Normal     Results for orders placed or performed during the hospital encounter of 02/02/25   POCT Flu Test    Collection Time: 02/02/25  8:52 AM    Specimen: Nares; Other   Result Value Ref Range    POCT INFLUENZA A Positive (A) Negative    POCT INFLUENZA B Negative Negative   POCT Urinalysis Dipstick    Collection Time: 02/02/25  8:55 AM   Result Value Ref Range    Urine Color Yellow Yellow    Urine Clarity Cloudy (A) Clear    Specific Gravity, Urine 1.025 1.005 - 1.030    PH, Urine 6.0 5.0 - 8.0    Protein urine Negative Negative mg/dL    Glucose, Urine Negative Negative mg/dL    Ketone, Urine Negative Negative mg/dL    Bilirubin, Urine Negative Negative    Blood, Urine Trace-Intact (A) Negative    Nitrite Urine Negative Negative    Urobilinogen urine <2.0 <2.0 mg/dL    Leukocyte esterase urine Trace (A) Negative   POCT Pregnancy, Urine    Collection Time: 02/02/25  8:59 AM   Result Value Ref Range    POCT Urine Pregnancy Negative Negative   POCT Rapid Strep    Collection Time: 02/02/25  9:00 AM   Result Value Ref Range    POCT Rapid Strep Negative Negative          MDM     Differential diagnoses considered included, but are not  exclusive of: URI, influenza, COVID, otitis, sinusitis, pharyngitis, strep throat, bronchitis, pneumonia, etc.    Patient is a 49-year-old Female, presenting to immediate care for several complaints.  She is coming e-care for concern for vaginal yeast infection.  She was recently treated for bacterial vaginosis and urinary tract infection with oral antibiotics.  Symptoms are similar to past vaginal yeast infection symptoms including vaginal irritation, itching, white cottage cheeselike discharge.  Will empirically treat for vaginal yeast infection with oral Diflucan.  In addition patient endorsing cold/flulike symptoms for the last several days.  Patient is well-appearing.  Has normal cardiac and lung exam.  Slight nasal congestion postnasal drip.  Remainder examination is unremarkable.  Influenza A+.  Strep negative.  Will treat patient supportively.  Needs Tamiflu benefit window.  OTC cough medication antitussive.  PCP follow-up.  Return precautions.  Stable for discharge    Medical Decision Making      Disposition and Plan     Clinical Impression:  1. Antibiotic-induced yeast infection    2. Influenza A    3. Vaginal yeast infection    4. Acute cough         Disposition:  Discharge  2/2/2025  9:17 am    Follow-up:  Eber Gonzalez MD  [address] [place] [id number]  [phone]                Medications Prescribed:  Current Discharge Medication List        START taking these medications    Details   fluconazole (DIFLUCAN) 150 MG Oral Tab Take 1 tablet (150 mg total) by mouth every third day as needed.  Qty: 2 tablet, Refills: 0      oseltamivir (TAMIFLU) 75 MG Oral Cap Take 1 capsule (75 mg total) by mouth 2 (two) times daily.  Qty: 10 capsule, Refills: 0      ibuprofen 600 MG Oral Tab Take 1 tablet (600 mg total) by mouth every 8 (eight) hours as needed for Pain or Fever.  Qty: 30 tablet, Refills: 0      benzonatate 100 MG Oral Cap Take 1 capsule (100 mg total) by mouth 3 (three) times daily as needed  for cough.  Qty: 30 capsule, Refills: 0                 Supplementary Documentation:

## 2025-02-05 ENCOUNTER — TELEPHONE (OUTPATIENT)
Age: 50
End: 2025-02-05

## 2025-02-10 ENCOUNTER — TELEPHONE (OUTPATIENT)
Age: 50
End: 2025-02-10

## 2025-02-13 ENCOUNTER — OFFICE VISIT (OUTPATIENT)
Dept: INTERNAL MEDICINE CLINIC | Facility: CLINIC | Age: 50
End: 2025-02-13
Payer: COMMERCIAL

## 2025-02-13 VITALS
DIASTOLIC BLOOD PRESSURE: 69 MMHG | HEIGHT: 61 IN | SYSTOLIC BLOOD PRESSURE: 130 MMHG | HEART RATE: 63 BPM | BODY MASS INDEX: 35.8 KG/M2 | WEIGHT: 189.63 LBS

## 2025-02-13 DIAGNOSIS — E66.09 CLASS 2 OBESITY DUE TO EXCESS CALORIES WITHOUT SERIOUS COMORBIDITY WITH BODY MASS INDEX (BMI) OF 35.0 TO 35.9 IN ADULT: ICD-10-CM

## 2025-02-13 DIAGNOSIS — E66.812 CLASS 2 OBESITY DUE TO EXCESS CALORIES WITHOUT SERIOUS COMORBIDITY WITH BODY MASS INDEX (BMI) OF 35.0 TO 35.9 IN ADULT: ICD-10-CM

## 2025-02-13 DIAGNOSIS — M79.605 PAIN OF LEFT LOWER EXTREMITY: Primary | ICD-10-CM

## 2025-02-13 PROCEDURE — 3008F BODY MASS INDEX DOCD: CPT | Performed by: NURSE PRACTITIONER

## 2025-02-13 PROCEDURE — 3075F SYST BP GE 130 - 139MM HG: CPT | Performed by: NURSE PRACTITIONER

## 2025-02-13 PROCEDURE — 99214 OFFICE O/P EST MOD 30 MIN: CPT | Performed by: NURSE PRACTITIONER

## 2025-02-13 PROCEDURE — 3078F DIAST BP <80 MM HG: CPT | Performed by: NURSE PRACTITIONER

## 2025-02-13 RX ORDER — CYCLOBENZAPRINE HCL 10 MG
10 TABLET ORAL NIGHTLY PRN
Qty: 30 TABLET | Refills: 0 | Status: SHIPPED | OUTPATIENT
Start: 2025-02-13 | End: 2025-03-15

## 2025-02-13 NOTE — ASSESSMENT & PLAN NOTE
IT Band Pain and left knee pain.    Plan   Ice areas of discomfort 15 to 20  minutes four times per day.   She does not want physical therapy     cyclobenzaprine 10 MG Oral Tab         Take 1 tablet (10 mg total) by mouth nightly as needed., Normal, Disp-30 tablet, R-0     Exercise Program for IT Band Syndrome   Your healthcare provider may recommend exercises to help treat your iliotibial (IT) band syndrome.   Talk to your healthcare provider or physical therapist about which exercises are best for you and your rehabilitation goals.   Start each exercise slowly. A little discomfort is normal but stop any exercise that causes pain.   Standing Iliotibial Band Stretch   Hold on to the back of a chair or table for balance. Cross one leg behind the other.  Lean to the side away from your back leg until you feel a stretch at the outside of that hip. (If your right leg is behind, lean to the left. If your left leg is behind, lean to the right.)  Hold for 15 to 30 seconds, then relax.  Repeat 3 times, then switch sides.  Tip:   Don’t bend forward or twist at the waist.    Forward Fold with Crossed Legs  Stand with your feet together.  Cross one leg over the other.  Reach down until you feel a stretch in your back leg.  Hold for 15 to 30 seconds.  Repeat 3 times. Then switch sides.    Side Lying Hip Abduction  Lie on your side on the floor with your top leg straight and bottom knee bent.  Lift your top leg about 6 to 8 inches. Keep your top leg and hip straight. Don’t roll back onto your hip.  Hold for 5 seconds, then lower your leg.  Repeat 10 times, then switch sides.    Clamshell  Lie on your side and bend both knees.  Keeping your feet together, lift your top knee up so your knees are . Keep your hips stacked on top of each other.  Slowly lower your knee back down.  Repeat 10 times. Then switch sides.    Side Stepping  Stand with your feet hip-width apart and your toes pointing forward. Keep your knees slightly  bent.  Step out to the side with one foot. Then bring your feet together by stepping with the other foot.  Take 10 steps to the same side. Then take 10 steps in the opposite direction.  Repeat 3 times.  Tip:  To make the exercise harder, add a resistance band above your knees or around your ankles.    Vonnie last reviewed this educational content on 12/1/2023 © 2000-2024 [employer]. All rights reserved. This information is not intended as a substitute for professional medical care. Always follow your healthcare professional'

## 2025-02-13 NOTE — ASSESSMENT & PLAN NOTE
Phentermine HCl 15 MG Oral Cap                Take 1 capsule (15 mg total) by mouth every morning., Normal, Disp-30 capsule, R-1    She wants to continue the Phentermine.    Discussed lifestyle modifications including reductions in dietary total and saturated fat, weight loss, aerobic exercise, and eating a diet rich in fruits and vegetables.  Reduce bread, pasta and rice in your diet.  Eliminate as much sugar from your diet as possible.

## 2025-02-13 NOTE — PROGRESS NOTES
HPI:    Patient ID: Vianey Cantu is a 49 year old female.  LMP  HPI Follow up Visit.  49 year old female I am following for weight loss.  She said she was on vacation and not eating healthy.  She wants to continue the phentermine 15mg daily.  Weight up 1 pound  Wt Readings from Last 6 Encounters:   25 189 lb 9.6 oz (86 kg)   25 188 lb 3.2 oz (85.4 kg)   24 187 lb 9.6 oz (85.1 kg)   10/31/24 190 lb 3.2 oz (86.3 kg)   10/10/24 189 lb (85.7 kg)   24 187 lb (84.8 kg)        Left Leg pain  IT BAND  She also has some knee pain.  Immunization History   Administered Date(s) Administered    Covid-19 Vaccine Pfizer 30 mcg/0.3 ml 2021, 2021, 2021    FLU VAC QIV SPLIT 3 YRS AND OLDER (34900) 2017    FLULAVAL 6 months & older 0.5 ml Prefilled syringe (20084) 2017, 2019, 10/20/2020, 10/28/2021    FLUZONE 6 months and older PFS 0.5 ml (94692) 2017    Fluvirin, 3 Years & >, Im 10/15/2010    Influenza 10/03/2013    TDAP 2019       Past Medical History:    Abdominal pain in female    LLQ. Colonoscopy : NL (10-14-15) except hemorrhoids.     Abnormal Pap smear of cervix    CHAYITO 1    Amenorrhea    Anemia    Anxiety    Cyst of buttocks    cyst on left buttocks, removed    Cyst of ovary, right    Decorative tattoo    Elevated liver enzymes    Fatty liver.     Esophageal reflux    S/P Lap. Nissen fundoplication.    Hiatal hernia    High blood pressure    High cholesterol    Incontinence    Umbilical hernia      Past Surgical History:   Procedure Laterality Date    Bravo 96hr - internal  2013    [place] 30 1st 48, 16 2nd 48          x 3      2006    Cholecystectomy      Colonoscopy  2020    Colonoscopy      Colonoscopy      Colonoscopy N/A 3/11/2024    Procedure: COLONOSCOPY;  Surgeon: Tyrel Thurston MD;  [facility]: [facility] ENDO    Egd  2013    Egd  2020    Egd      Esophageal manometry - internal  2013    normal    Excis  primary ganglion wrist Left     wrist x2    Forearm/wrist surgery unlisted Left     wrist tendon surgery    Hemorrhoidectomy  11/2015    internal and anal skin tag removeal. Benign.     Hernia surgery      Laparoscopic fundoplasty  11/27/2013    hiatal hernia     Daniel localization wire 1 site left (cpt=19281) Left 07/2017    sebaceous cyst    Other surgical history      Lasik    Other surgical history Left     ankle tendon repair    Tubal ligation  2014      Social History     Socioeconomic History    Marital status:    Occupational History    Occupation: Retired. Was dental ass't.    Tobacco Use    Smoking status: Never    Smokeless tobacco: Never   Vaping Use    Vaping status: Never Used   Substance and Sexual Activity    Alcohol use: No    Drug use: Never    Sexual activity: Yes     Partners: Male     Birth control/protection: Tubal Ligation   Other Topics Concern    Caffeine Concern No    Exercise Yes     Comment: walks for 20 minutes, 3 times per week.          Review of Systems   Constitutional:  Negative for chills, fatigue and fever.   HENT:  Negative for ear pain, hearing loss, sinus pain, sore throat and trouble swallowing.    Eyes:  Negative for pain and visual disturbance.   Respiratory:  Negative for cough, chest tightness and shortness of breath.    Cardiovascular:  Negative for chest pain, palpitations and leg swelling.   Gastrointestinal:  Negative for abdominal pain, constipation, diarrhea, nausea and vomiting.   Endocrine: Negative for cold intolerance and heat intolerance.   Genitourinary:  Negative for dysuria and hematuria.   Musculoskeletal:  Negative for back pain and joint swelling.        Left upper thigh pain   Skin:  Negative for rash.   Allergic/Immunologic: Negative for environmental allergies.   Neurological:  Negative for weakness, numbness and headaches.   Hematological:  Does not bruise/bleed easily.   Psychiatric/Behavioral:  Negative for dysphoric mood and sleep disturbance.  The patient is not nervous/anxious.               Current Outpatient Medications   Medication Sig Dispense Refill    cyclobenzaprine 10 MG Oral Tab Take 1 tablet (10 mg total) by mouth nightly as needed. 30 tablet 0    ibuprofen 600 MG Oral Tab Take 1 tablet (600 mg total) by mouth every 8 (eight) hours as needed for Pain or Fever. 30 tablet 0    Phentermine HCl 15 MG Oral Cap Take 1 capsule (15 mg total) by mouth every morning. 30 capsule 1    Omeprazole 40 MG Oral Capsule Delayed Release Take 1 capsule (40 mg total) by mouth every morning before breakfast. 30 capsule 0    oxybutynin ER 10 MG Oral Tablet 24 Hr Take 1 tablet (10 mg total) by mouth daily. 90 tablet 3    metoprolol succinate ER 25 MG Oral Tablet 24 Hr Take 0.5 tablets (12.5 mg total) by mouth daily. 45 tablet 3    simvastatin 20 MG Oral Tab Take 1 tablet (20 mg total) by mouth nightly. 90 tablet 3    fluconazole (DIFLUCAN) 150 MG Oral Tab Take 1 tablet (150 mg total) by mouth every third day as needed. (Patient not taking: Reported on 2/13/2025) 2 tablet 0    oseltamivir (TAMIFLU) 75 MG Oral Cap Take 1 capsule (75 mg total) by mouth 2 (two) times daily. (Patient not taking: Reported on 2/13/2025) 10 capsule 0    benzonatate 100 MG Oral Cap Take 1 capsule (100 mg total) by mouth 3 (three) times daily as needed for cough. (Patient not taking: Reported on 2/13/2025) 30 capsule 0    FLUoxetine 20 MG Oral Cap Take 1 capsule (20 mg total) by mouth daily. (Patient not taking: Reported on 2/13/2025) 90 capsule 1    saccharomyces boulardii (FLORASTOR) 250 MG Oral Cap Take 1 capsule (250 mg total) by mouth 2 (two) times daily. (Patient not taking: Reported on 2/13/2025) 90 capsule 2     Allergies:Allergies[1]   PHYSICAL EXAM:   Physical Exam  Constitutional:       Appearance: Normal appearance. She is well-developed.   HENT:      Head: Normocephalic.   Cardiovascular:      Rate and Rhythm: Normal rate and regular rhythm.      Heart sounds: Normal heart sounds.  No murmur heard.     No friction rub. No gallop.   Pulmonary:      Effort: Pulmonary effort is normal. No respiratory distress.      Breath sounds: Normal breath sounds. No wheezing, rhonchi or rales.   Abdominal:      General: Bowel sounds are normal. There is no distension.      Palpations: Abdomen is soft. There is no mass.      Tenderness: There is no abdominal tenderness. There is no right CVA tenderness, left CVA tenderness or guarding.   Musculoskeletal:         General: No tenderness.      Cervical back: Normal range of motion and neck supple. No tenderness.      Right lower leg: No edema.      Left lower leg: No edema.   Lymphadenopathy:      Cervical: No cervical adenopathy.   Skin:     General: Skin is warm and dry.      Findings: No rash.   Neurological:      Mental Status: She is alert and oriented to person, place, and time.      Coordination: Coordination normal.      Gait: Gait normal.   Psychiatric:         Mood and Affect: Mood normal.         Behavior: Behavior normal.         Thought Content: Thought content normal.         Judgment: Judgment normal.       /69 (BP Location: Right arm, Patient Position: Sitting, Cuff Size: adult)   Pulse 63   Ht 5' 1\" (1.549 m)   Wt 189 lb 9.6 oz (86 kg)   LMP 01/08/2025 (Approximate)   BMI 35.82 kg/m²   Wt Readings from Last 2 Encounters:   02/13/25 189 lb 9.6 oz (86 kg)   01/31/25 188 lb 3.2 oz (85.4 kg)     Body mass index is 35.82 kg/m².(2)  Lab Results   Component Value Date    WBC 5.7 01/31/2025    RBC 4.84 01/31/2025    HGB 14.1 01/31/2025    HCT 41.8 01/31/2025    MCV 86.4 01/31/2025    MCH 29.1 01/31/2025    MCHC 33.7 01/31/2025    RDW 14.2 01/31/2025    .0 01/31/2025    MPV 7.9 09/22/2018      Lab Results   Component Value Date    GLU 99 01/31/2025    BUN 23 01/31/2025    BUNCREA 31.1 (H) 01/31/2025    CREATSERUM 0.74 01/31/2025    ANIONGAP 10 01/31/2025    GFR >60 02/17/2016    GFRNAA 101 07/23/2022    GFRAA 117 07/23/2022    CA 8.9  01/31/2025    OSMOCALC 298 (H) 01/31/2025    ALKPHO 68 01/31/2025    AST 18 01/31/2025    ALT 26 01/31/2025    ALKPHOS 65 06/16/2016    BILT 0.4 01/31/2025    TP 6.6 01/31/2025    ALB 4.6 01/31/2025    GLOBULIN 2.0 01/31/2025    AGRATIO 1.4 06/16/2016     01/31/2025    K 4.1 01/31/2025     01/31/2025    CO2 26.0 01/31/2025      Lab Results   Component Value Date     (H) 06/27/2023    A1C 6.1 (H) 06/27/2023      Lab Results   Component Value Date    CHOLEST 148 07/11/2024    TRIG 74 07/11/2024    HDL 50 07/11/2024    LDL 83 07/11/2024    VLDL 12 07/11/2024    NONHDLC 98 07/11/2024    CALCNONHDL 134 (H) 07/01/2015      Lab Results   Component Value Date    T4F 0.80 02/15/2013    TSH 0.702 07/11/2024                ASSESSMENT/PLAN:     Problem List Items Addressed This Visit       Class 2 obesity due to excess calories with body mass index (BMI) of 35.0 to 35.9 in adult              Phentermine HCl 15 MG Oral Cap                Take 1 capsule (15 mg total) by mouth every morning., Normal, Disp-30 capsule, R-1    She wants to continue the Phentermine.    Discussed lifestyle modifications including reductions in dietary total and saturated fat, weight loss, aerobic exercise, and eating a diet rich in fruits and vegetables.  Reduce bread, pasta and rice in your diet.  Eliminate as much sugar from your diet as possible.          Pain of left lower extremity - Primary     IT Band Pain and left knee pain.    Plan   Ice areas of discomfort 15 to 20  minutes four times per day.   She does not want physical therapy     cyclobenzaprine 10 MG Oral Tab         Take 1 tablet (10 mg total) by mouth nightly as needed., Normal, Disp-30 tablet, R-0     Exercise Program for IT Band Syndrome   Your healthcare provider may recommend exercises to help treat your iliotibial (IT) band syndrome.   Talk to your healthcare provider or physical therapist about which exercises are best for you and your rehabilitation goals.    Start each exercise slowly. A little discomfort is normal but stop any exercise that causes pain.   Standing Iliotibial Band Stretch   Hold on to the back of a chair or table for balance. Cross one leg behind the other.  Lean to the side away from your back leg until you feel a stretch at the outside of that hip. (If your right leg is behind, lean to the left. If your left leg is behind, lean to the right.)  Hold for 15 to 30 seconds, then relax.  Repeat 3 times, then switch sides.  Tip:   Don’t bend forward or twist at the waist.    Forward Fold with Crossed Legs  Stand with your feet together.  Cross one leg over the other.  Reach down until you feel a stretch in your back leg.  Hold for 15 to 30 seconds.  Repeat 3 times. Then switch sides.    Side Lying Hip Abduction  Lie on your side on the floor with your top leg straight and bottom knee bent.  Lift your top leg about 6 to 8 inches. Keep your top leg and hip straight. Don’t roll back onto your hip.  Hold for 5 seconds, then lower your leg.  Repeat 10 times, then switch sides.    Clamshell  Lie on your side and bend both knees.  Keeping your feet together, lift your top knee up so your knees are . Keep your hips stacked on top of each other.  Slowly lower your knee back down.  Repeat 10 times. Then switch sides.    Side Stepping  Stand with your feet hip-width apart and your toes pointing forward. Keep your knees slightly bent.  Step out to the side with one foot. Then bring your feet together by stepping with the other foot.  Take 10 steps to the same side. Then take 10 steps in the opposite direction.  Repeat 3 times.  Tip:  To make the exercise harder, add a resistance band above your knees or around your ankles.    IPICO last reviewed this educational content on 12/1/2023  © 4314-7423 [employer]. All rights reserved. This information is not intended as a substitute for professional medical care. Always follow your healthcare  professional'         Relevant Medications    cyclobenzaprine 10 MG Oral Tab          No orders of the defined types were placed in this encounter.      Meds This Visit:  Requested Prescriptions     Signed Prescriptions Disp Refills    cyclobenzaprine 10 MG Oral Tab 30 tablet 0     Sig: Take 1 tablet (10 mg total) by mouth nightly as needed.       Imaging & Referrals:  None         JERZY Galvan          [1] No Known Allergies

## 2025-02-18 ENCOUNTER — OFFICE VISIT (OUTPATIENT)
Dept: OBGYN CLINIC | Facility: CLINIC | Age: 50
End: 2025-02-18
Payer: COMMERCIAL

## 2025-02-18 VITALS — WEIGHT: 187 LBS | BODY MASS INDEX: 35 KG/M2 | SYSTOLIC BLOOD PRESSURE: 141 MMHG | DIASTOLIC BLOOD PRESSURE: 74 MMHG

## 2025-02-18 DIAGNOSIS — N76.0 VAGINITIS AND VULVOVAGINITIS: Primary | ICD-10-CM

## 2025-02-18 PROCEDURE — 3078F DIAST BP <80 MM HG: CPT | Performed by: STUDENT IN AN ORGANIZED HEALTH CARE EDUCATION/TRAINING PROGRAM

## 2025-02-18 PROCEDURE — 99213 OFFICE O/P EST LOW 20 MIN: CPT | Performed by: STUDENT IN AN ORGANIZED HEALTH CARE EDUCATION/TRAINING PROGRAM

## 2025-02-18 PROCEDURE — 3077F SYST BP >= 140 MM HG: CPT | Performed by: STUDENT IN AN ORGANIZED HEALTH CARE EDUCATION/TRAINING PROGRAM

## 2025-02-18 NOTE — PROGRESS NOTES
[facility]  Obstetrics and Gynecology  Gyne Problem Visit      Vianey Cantu is a 49 year old female  is a new patient to me presenting with concerns of white discharge, odor, irritation, and itching for the last two weeks. She was treated for possible yeast infection with 2 doses of diflucan about two weeks ago. She denies any recent antibiotic use. No new sexual partners. No lesions. Reports slight improvement with symptoms today.     Patient's last menstrual period was 2025 (approximate).     Pap:   Contraception:tubal ligation    OBSTETRICS HISTORY:  OB History    Para Term  AB Living   4 3 0 0 1 3   SAB IAB Ectopic Multiple Live Births   1 0 0 0 0       GYNE HISTORY:      Period Cycle (Days): 28 (2024  9:00 AM)  Period Duration (Days): 2/3 (2024  9:00 AM)  Period Flow: moderate (2024  9:00 AM)  Use of Birth Control (if yes, specify type): Tubal Ligation (2024  9:00 AM)  Hx Prior Abnormal Pap: No (2024  9:00 AM)  Pap Date: 11/10/22 (2024  9:00 AM)  Pap Result Notes: negative (2024  9:00 AM)        Latest Ref Rng & Units 2024    10:53 AM 11/10/2022     3:38 PM 10/26/2020     3:01 PM 2019     3:36 PM 2018     1:21 PM 2017     1:46 PM   RECENT PAP RESULTS   INTERPRETATION/RESULT: Negative for intraepithelial lesion or malignancy Negative for intraepithelial lesion or malignancy  Negative for intraepithelial lesion or malignancy  Negative for intraepithelial lesion or malignancy  Atypical squamous cells of undetermined significance (ASC-US)  Negative for intraepithelial lesion or malignancy  Negative for intraepithelial lesion or malignancy-Reactive/Other changes    HPV Negative Negative  Negative  Negative  Positive  Negative  Negative          History   Sexual Activity    Sexual activity: Yes    Partners: Male    Birth control/ protection: Tubal Ligation       MEDICAL HISTORY:  Past Medical History:   Diagnosis Date    Abdominal pain in female      LLQ. Colonoscopy : NL (10-14-15) except hemorrhoids.     Abnormal Pap smear of cervix     CHAYITO 1    Amenorrhea     Anemia     Anxiety     Cyst of buttocks     cyst on left buttocks, removed    Cyst of ovary, right     Decorative tattoo     Elevated liver enzymes     Fatty liver.     Esophageal reflux     S/P Lap. Nissen fundoplication.    Hiatal hernia     High blood pressure     High cholesterol     Incontinence     Umbilical hernia      Past Surgical History:   Procedure Laterality Date    Bravo 96hr - internal  2013    De[place]ter 30 1st 48, 16 2nd 48          x 3      2006    Cholecystectomy      Colonoscopy  2020    Colonoscopy      Colonoscopy      Colonoscopy N/A 3/11/2024    Procedure: COLONOSCOPY;  Surgeon: Tyrel Thurston MD;  Location: [facility] ENDO    Egd  2013    Egd  2020    Egd      Esophageal manometry - internal  2013    normal    Excis primary ganglion wrist Left     wrist x2    Forearm/wrist surgery unlisted Left     wrist tendon surgery    Hemorrhoidectomy  2015    internal and anal skin tag removeal. Benign.     Hernia surgery      Laparoscopic fundoplasty  2013    hiatal hernia     Daniel localization wire 1 site left (cpt=19281) Left 2017    sebaceous cyst    Other surgical history      Lasik    Other surgical history Left     ankle tendon repair    Tubal ligation         SOCIAL HISTORY:  Social History     Socioeconomic History    Marital status:      Spouse name: Not on file    Number of children: Not on file    Years of education: Not on file    Highest education level: Not on file   Occupational History    Occupation: Retired. Was dental ass't.    Tobacco Use    Smoking status: Never    Smokeless tobacco: Never   Vaping Use    Vaping status: Never Used   Substance and Sexual Activity    Alcohol use: No    Drug use: Never    Sexual activity: Yes     Partners: Male     Birth control/protection: Tubal Ligation    Other Topics Concern     Service Not Asked    Blood Transfusions Not Asked    Caffeine Concern No    Occupational Exposure Not Asked    Hobby Hazards Not Asked    Sleep Concern Not Asked    Stress Concern Not Asked    Weight Concern Not Asked    Special Diet Not Asked    Back Care Not Asked    Exercise Yes     Comment: walks for 20 minutes, 3 times per week.    Bike Helmet Not Asked    Seat Belt Not Asked    Self-Exams Not Asked   Social History Narrative    Noreen is  x 20yrs. She has 3 children. 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 Noreen lives with her family in [place], [place].      Social Drivers of Health     Food Insecurity: No Food Insecurity (2/13/2025)    NCSS - Food Insecurity     Worried About Running Out of Food in the Last Year: No     Ran Out of Food in the Last Year: No   Transportation Needs: No Transportation Needs (2/13/2025)    NCSS - Transportation     Lack of Transportation: No   Stress: Not on file   Housing Stability: Not At Risk (2/13/2025)    NCSS - Housing/Utilities     Has Housing: Yes     Worried About Losing Housing: No     Unable to Get Utilities: No       MEDICATIONS:    Current Outpatient Medications:     cyclobenzaprine 10 MG Oral Tab, Take 1 tablet (10 mg total) by mouth nightly as needed., Disp: 30 tablet, Rfl: 0    fluconazole (DIFLUCAN) 150 MG Oral Tab, Take 1 tablet (150 mg total) by mouth every third day as needed. (Patient not taking: Reported on 2/13/2025), Disp: 2 tablet, Rfl: 0    oseltamivir (TAMIFLU) 75 MG Oral Cap, Take 1 capsule (75 mg total) by mouth 2 (two) times daily. (Patient not taking: Reported on 2/13/2025), Disp: 10 capsule, Rfl: 0    ibuprofen 600 MG Oral Tab, Take 1 tablet (600 mg total) by mouth every 8 (eight) hours as needed for Pain or Fever., Disp: 30 tablet, Rfl: 0    benzonatate 100 MG Oral Cap, Take 1 capsule (100 mg total) by mouth 3 (three) times daily as needed for cough. (Patient not taking: Reported on 2/13/2025), Disp: 30 capsule,  Rfl: 0    Phentermine HCl 15 MG Oral Cap, Take 1 capsule (15 mg total) by mouth every morning., Disp: 30 capsule, Rfl: 1    Omeprazole 40 MG Oral Capsule Delayed Release, Take 1 capsule (40 mg total) by mouth every morning before breakfast., Disp: 30 capsule, Rfl: 0    oxybutynin ER 10 MG Oral Tablet 24 Hr, Take 1 tablet (10 mg total) by mouth daily., Disp: 90 tablet, Rfl: 3    metoprolol succinate ER 25 MG Oral Tablet 24 Hr, Take 0.5 tablets (12.5 mg total) by mouth daily., Disp: 45 tablet, Rfl: 3    FLUoxetine 20 MG Oral Cap, Take 1 capsule (20 mg total) by mouth daily. (Patient not taking: Reported on 2/13/2025), Disp: 90 capsule, Rfl: 1    simvastatin 20 MG Oral Tab, Take 1 tablet (20 mg total) by mouth nightly., Disp: 90 tablet, Rfl: 3    saccharomyces boulardii (FLORASTOR) 250 MG Oral Cap, Take 1 capsule (250 mg total) by mouth 2 (two) times daily. (Patient not taking: Reported on 2/13/2025), Disp: 90 capsule, Rfl: 2    ALLERGIES:  Allergies[1]      REVIEW OF SYSTEMS:  Review of Systems   Constitutional:  Negative for chills, fever and unexpected weight change.   Respiratory: Negative.     Cardiovascular: Negative.    Gastrointestinal:  Negative for abdominal pain, constipation, diarrhea and nausea.   Genitourinary:  Positive for vaginal discharge. Negative for dyspareunia, dysuria, genital sores, hematuria, menstrual problem, pelvic pain, vaginal bleeding and vaginal pain.   Musculoskeletal: Negative.    Skin: Negative.    Neurological: Negative.    Hematological: Negative.    Psychiatric/Behavioral: Negative.         PHYSICAL EXAM:  /74 (BP Location: Right arm, Patient Position: Sitting, Cuff Size: adult)   Wt 187 lb (84.8 kg)   LMP 01/08/2025 (Approximate)   BMI 35.33 kg/m²     GENERAL: well developed, well nourished, in no apparent distress  ABDOMEN: Soft, non distended; non tender, no masses  GYNE/: External Genitalia: Normal appearing, no lesions. Urethral meatus appear wnl, no abnormal  discharge or lesions noted.          Bladder: well supported, urethra wnl, no lesions or fissures                     Vagina: normal pink mucosa, no lesions, normal clear discharge.                      Uterus: mobile, non tender, normal size                     Cervix: Normal                      Adnexa: non tender, no masses, normal size     ASSESSMENT:       ICD-10-CM    1. Vaginitis and vulvovaginitis  N76.0 Vaginitis Vaginosis PCR Panel          Plan:  - Cultures collected. Will treat pending results. Vaginal hygiene discussed with patient.     CAS ARREOLA PA-C  7:44 AM  2/18/2025        Spent total time 20 minutes on obtaining history / chart review, evaluating patient / performing medically appropriate exam, discussing treatment options, counseling / educating, and completing documentation, coordinating care.         [1] No Known Allergies

## 2025-02-19 LAB
BV BACTERIA DNA VAG QL NAA+PROBE: NEGATIVE
C GLABRATA DNA VAG QL NAA+PROBE: NEGATIVE
C KRUSEI DNA VAG QL NAA+PROBE: NEGATIVE
CANDIDA DNA VAG QL NAA+PROBE: NEGATIVE
T VAGINALIS DNA VAG QL NAA+PROBE: NEGATIVE

## 2025-02-24 ENCOUNTER — OFFICE VISIT (OUTPATIENT)
Age: 50
End: 2025-02-24
Attending: NURSE PRACTITIONER
Payer: COMMERCIAL

## 2025-02-24 VITALS
DIASTOLIC BLOOD PRESSURE: 75 MMHG | OXYGEN SATURATION: 96 % | HEART RATE: 55 BPM | TEMPERATURE: 97 F | SYSTOLIC BLOOD PRESSURE: 125 MMHG

## 2025-02-24 DIAGNOSIS — E61.1 IRON DEFICIENCY: Primary | ICD-10-CM

## 2025-02-24 NOTE — PROGRESS NOTES
Patient arrives to infusion for Injectafer. Patient denies any issues or concerns.      Ordering Provider: Lilia Jimenez APRN  Order Exp: 1/2 doses remaining in order     Patient appeared to tolerate infusion without difficulty or complaint. No s/s of adverse reaction noted. VSS post infusion. Reviewed next apt date/time.    Education Record  Learner:  Patient  Disease / Diagnosis: AGA  Barriers / Limitations:  None  Method:  Discussion  General Topics:  Medication, Side effects and symptom management, and Plan of care reviewed  Outcome:  Shows understanding

## 2025-03-13 ENCOUNTER — OFFICE VISIT (OUTPATIENT)
Dept: INTERNAL MEDICINE CLINIC | Facility: CLINIC | Age: 50
End: 2025-03-13
Payer: COMMERCIAL

## 2025-03-13 VITALS
WEIGHT: 187.81 LBS | RESPIRATION RATE: 16 BRPM | HEIGHT: 61 IN | TEMPERATURE: 97 F | BODY MASS INDEX: 35.46 KG/M2 | OXYGEN SATURATION: 95 % | HEART RATE: 60 BPM | DIASTOLIC BLOOD PRESSURE: 72 MMHG | SYSTOLIC BLOOD PRESSURE: 134 MMHG

## 2025-03-13 DIAGNOSIS — M54.32 LEFT SCIATIC NERVE PAIN: Primary | ICD-10-CM

## 2025-03-13 DIAGNOSIS — M79.18 BUTTOCK PAIN: ICD-10-CM

## 2025-03-13 PROCEDURE — 3078F DIAST BP <80 MM HG: CPT | Performed by: NURSE PRACTITIONER

## 2025-03-13 PROCEDURE — 99214 OFFICE O/P EST MOD 30 MIN: CPT | Performed by: NURSE PRACTITIONER

## 2025-03-13 PROCEDURE — 3075F SYST BP GE 130 - 139MM HG: CPT | Performed by: NURSE PRACTITIONER

## 2025-03-13 PROCEDURE — 3008F BODY MASS INDEX DOCD: CPT | Performed by: NURSE PRACTITIONER

## 2025-03-13 RX ORDER — METHYLPREDNISOLONE 4 MG/1
TABLET ORAL
Qty: 1 EACH | Refills: 0 | Status: SHIPPED | OUTPATIENT
Start: 2025-03-13

## 2025-03-13 RX ORDER — GABAPENTIN 300 MG/1
300 CAPSULE ORAL NIGHTLY
Qty: 30 CAPSULE | Refills: 5 | Status: SHIPPED | OUTPATIENT
Start: 2025-03-13

## 2025-03-13 NOTE — ASSESSMENT & PLAN NOTE
Left sciatic pain.- Buttock  This has not been relieved with muscle relaxers.    Plan   Ice areas of discomfort 15 to 20  minutes four times per day.   gabapentin 300 MG Oral Cap          Take 1 capsule (300 mg total) by mouth nightly., Normal, Disp-30 capsule, R-5       methylPREDNISolone (MEDROL) 4 MG Oral Tablet Therapy Pack         As directed., Normal, Disp-1 each, R-0                      Physiatry Referral - In Network         [facility] - RFL, Routine, To - DANIELA LOU Y 1 visit     Patient to follow up in two months

## 2025-03-13 NOTE — PROGRESS NOTES
HPI:    Patient ID: Vianey Cantu is a 49 year old female.    HPI Follow up- Left sciatic pain  49-year-old female who I saw for pain in her left upper thigh.  She was given muscle relaxers and exercises and has no improvement.  Pain is in her left thigh radiating from her buttocks into her thigh.    Wt Readings from Last 6 Encounters:   25 187 lb 12.8 oz (85.2 kg)   25 187 lb (84.8 kg)   25 189 lb 9.6 oz (86 kg)   25 188 lb 3.2 oz (85.4 kg)   24 187 lb 9.6 oz (85.1 kg)   10/31/24 190 lb 3.2 oz (86.3 kg)        Immunization History   Administered Date(s) Administered    Covid-19 Vaccine Pfizer 30 mcg/0.3 ml 2021, 2021, 2021    FLU VAC QIV SPLIT 3 YRS AND OLDER (10890) 2017    FLULAVAL 6 months & older 0.5 ml Prefilled syringe (95151) 2017, 2019, 10/20/2020, 10/28/2021    FLUZONE 6 months and older PFS 0.5 ml (33437) 2017    Fluvirin, 3 Years & >, Im 10/15/2010    Influenza 10/03/2013    TDAP 2019       Past Medical History:    Abdominal pain in female    LLQ. Colonoscopy : NL (10-14-15) except hemorrhoids.     Abnormal Pap smear of cervix    CHAYITO 1    Amenorrhea    Anemia    Anxiety    Cyst of buttocks    cyst on left buttocks, removed    Cyst of ovary, right    Decorative tattoo    Elevated liver enzymes    Fatty liver.     Esophageal reflux    S/P Lap. Nissen fundoplication.    Hiatal hernia    High blood pressure    High cholesterol    Incontinence    Umbilical hernia      Past Surgical History:   Procedure Laterality Date    Bravo 96hr - internal  2013    [place]ter 30 1st 48, 16 2nd 48          x 3      2006    Cholecystectomy      Colonoscopy  2020    Colonoscopy      Colonoscopy      Colonoscopy N/A 3/11/2024    Procedure: COLONOSCOPY;  Surgeon: Tyrel Thurston MD;  Location: [facility] ENDO    Egd  2013    Egd  2020    Egd      Esophageal manometry - internal  2013    normal    Excis  primary ganglion wrist Left     wrist x2    Forearm/wrist surgery unlisted Left     wrist tendon surgery    Hemorrhoidectomy  11/2015    internal and anal skin tag removeal. Benign.     Hernia surgery      Laparoscopic fundoplasty  11/27/2013    hiatal hernia     Daniel localization wire 1 site left (cpt=19281) Left 07/2017    sebaceous cyst    Other surgical history      Lasik    Other surgical history Left     ankle tendon repair    Tubal ligation  2014      Social History     Socioeconomic History    Marital status:    Occupational History    Occupation: Retired. Was dental ass't.    Tobacco Use    Smoking status: Never    Smokeless tobacco: Never   Vaping Use    Vaping status: Never Used   Substance and Sexual Activity    Alcohol use: No    Drug use: Never    Sexual activity: Yes     Partners: Male     Birth control/protection: Tubal Ligation   Other Topics Concern    Caffeine Concern No    Exercise Yes     Comment: walks for 20 minutes, 3 times per week.          Review of Systems   Constitutional:  Negative for chills, fatigue and fever.   HENT:  Negative for congestion, ear discharge, ear pain, facial swelling, hearing loss, postnasal drip, sinus pressure, sore throat and trouble swallowing.    Eyes:  Negative for pain, discharge, redness and visual disturbance.   Respiratory:  Negative for cough, chest tightness, shortness of breath and wheezing.    Cardiovascular:  Negative for chest pain, palpitations and leg swelling.   Gastrointestinal:  Negative for abdominal distention, abdominal pain, constipation, diarrhea, nausea and vomiting.   Endocrine: Negative for cold intolerance, heat intolerance, polydipsia, polyphagia and polyuria.   Genitourinary:  Negative for difficulty urinating, dysuria, pelvic pain and vaginal bleeding.   Musculoskeletal:  Negative for back pain, gait problem, neck pain and neck stiffness.        Left buttock and thigh pain.   Skin:  Negative for color change and rash.    Neurological:  Negative for dizziness, seizures, weakness and headaches.   Psychiatric/Behavioral:  Negative for agitation and sleep disturbance. The patient is not nervous/anxious.               Current Outpatient Medications   Medication Sig Dispense Refill    gabapentin 300 MG Oral Cap Take 1 capsule (300 mg total) by mouth nightly. 30 capsule 5    methylPREDNISolone (MEDROL) 4 MG Oral Tablet Therapy Pack As directed. 1 each 0    ibuprofen 600 MG Oral Tab Take 1 tablet (600 mg total) by mouth every 8 (eight) hours as needed for Pain or Fever. 30 tablet 0    oxybutynin ER 10 MG Oral Tablet 24 Hr Take 1 tablet (10 mg total) by mouth daily. 90 tablet 3    metoprolol succinate ER 25 MG Oral Tablet 24 Hr Take 0.5 tablets (12.5 mg total) by mouth daily. 45 tablet 3    simvastatin 20 MG Oral Tab Take 1 tablet (20 mg total) by mouth nightly. 90 tablet 3    cyclobenzaprine 10 MG Oral Tab Take 1 tablet (10 mg total) by mouth nightly as needed. (Patient not taking: Reported on 3/13/2025) 30 tablet 0    fluconazole (DIFLUCAN) 150 MG Oral Tab Take 1 tablet (150 mg total) by mouth every third day as needed. (Patient not taking: Reported on 3/13/2025) 2 tablet 0    oseltamivir (TAMIFLU) 75 MG Oral Cap Take 1 capsule (75 mg total) by mouth 2 (two) times daily. (Patient not taking: Reported on 3/13/2025) 10 capsule 0    FLUoxetine 20 MG Oral Cap Take 1 capsule (20 mg total) by mouth daily. (Patient not taking: Reported on 10/31/2024) 90 capsule 1    saccharomyces boulardii (FLORASTOR) 250 MG Oral Cap Take 1 capsule (250 mg total) by mouth 2 (two) times daily. (Patient not taking: Reported on 3/13/2025) 90 capsule 2     Allergies:Allergies[1]   PHYSICAL EXAM:   Physical Exam  /72 (BP Location: Right arm, Patient Position: Sitting, Cuff Size: adult)   Pulse 60   Temp 97.1 °F (36.2 °C) (Temporal)   Resp 16   Ht 5' 1\" (1.549 m)   Wt 187 lb 12.8 oz (85.2 kg)   LMP 03/03/2025 (Approximate)   SpO2 95%   BMI 35.48 kg/m²    Wt Readings from Last 2 Encounters:   03/13/25 187 lb 12.8 oz (85.2 kg)   02/18/25 187 lb (84.8 kg)     Body mass index is 35.48 kg/m².(2)  Lab Results   Component Value Date    WBC 5.7 01/31/2025    RBC 4.84 01/31/2025    HGB 14.1 01/31/2025    HCT 41.8 01/31/2025    MCV 86.4 01/31/2025    MCH 29.1 01/31/2025    MCHC 33.7 01/31/2025    RDW 14.2 01/31/2025    .0 01/31/2025    MPV 7.9 09/22/2018      Lab Results   Component Value Date    GLU 99 01/31/2025    BUN 23 01/31/2025    BUNCREA 31.1 (H) 01/31/2025    CREATSERUM 0.74 01/31/2025    ANIONGAP 10 01/31/2025    GFR >60 02/17/2016    GFRNAA 101 07/23/2022    GFRAA 117 07/23/2022    CA 8.9 01/31/2025    OSMOCALC 298 (H) 01/31/2025    ALKPHO 68 01/31/2025    AST 18 01/31/2025    ALT 26 01/31/2025    ALKPHOS 65 06/16/2016    BILT 0.4 01/31/2025    TP 6.6 01/31/2025    ALB 4.6 01/31/2025    GLOBULIN 2.0 01/31/2025    AGRATIO 1.4 06/16/2016     01/31/2025    K 4.1 01/31/2025     01/31/2025    CO2 26.0 01/31/2025      Lab Results   Component Value Date     (H) 06/27/2023    A1C 6.1 (H) 06/27/2023      Lab Results   Component Value Date    CHOLEST 148 07/11/2024    TRIG 74 07/11/2024    HDL 50 07/11/2024    LDL 83 07/11/2024    VLDL 12 07/11/2024    NONHDLC 98 07/11/2024    CALCNONHDL 134 (H) 07/01/2015      Lab Results   Component Value Date    T4F 0.80 02/15/2013    TSH 0.702 07/11/2024                ASSESSMENT/PLAN:     Problem List Items Addressed This Visit       Buttock pain     Left sciatic pain.- Buttock  This has not been relieved with muscle relaxers.    Plan   Ice areas of discomfort 15 to 20  minutes four times per day.   gabapentin 300 MG Oral Cap          Take 1 capsule (300 mg total) by mouth nightly., Normal, Disp-30 capsule, R-5       methylPREDNISolone (MEDROL) 4 MG Oral Tablet Therapy Pack         As directed., Normal, Disp-1 each, R-0                      Physiatry Referral - In Network         [facility] - RFL, Routine, To -  DANIELA LOU Y 1 visit     Patient to follow up in two months         Relevant Medications    gabapentin 300 MG Oral Cap    methylPREDNISolone (MEDROL) 4 MG Oral Tablet Therapy Pack     Other Visit Diagnoses       Left sciatic nerve pain    -  Primary    Relevant Medications    gabapentin 300 MG Oral Cap    methylPREDNISolone (MEDROL) 4 MG Oral Tablet Therapy Pack    Other Relevant Orders    Physiatry Referral - In Network               No orders of the defined types were placed in this encounter.      Meds This Visit:  Requested Prescriptions     Signed Prescriptions Disp Refills    gabapentin 300 MG Oral Cap 30 capsule 5     Sig: Take 1 capsule (300 mg total) by mouth nightly.    methylPREDNISolone (MEDROL) 4 MG Oral Tablet Therapy Pack 1 each 0     Sig: As directed.     Patient instructed not to take any ibuprofen with this medication.  Imaging & Referrals:  PHYSIATRY - INTERNAL         JERZY Galvan          [1] No Known Allergies

## 2025-03-27 ENCOUNTER — TELEPHONE (OUTPATIENT)
Dept: INTERNAL MEDICINE CLINIC | Facility: CLINIC | Age: 50
End: 2025-03-27

## 2025-03-27 RX ORDER — OMEPRAZOLE 40 MG/1
40 CAPSULE, DELAYED RELEASE ORAL
Qty: 90 CAPSULE | Refills: 0 | Status: SHIPPED | OUTPATIENT
Start: 2025-03-27 | End: 2025-06-25

## 2025-03-27 NOTE — TELEPHONE ENCOUNTER
[Last office visit was 3/13/25 with JERZY Galvan]    Patient called states she would like primary care to manage her omeprazole in the future, she will request Rx from gastroenterology this time, last prescribed in 11/2024 by gastroenterology department provider--> transferred to department to request refill. She denies any symptoms. I made her aware I will convey the above to JERZY Galvan to ask if she is willing to prescribe Rx for future refills. Patient verbalized understanding. No further questions or concerns at this time.    JERZY Galvan - please advise if you are willing to prescribe Rx in the future.

## 2025-03-27 NOTE — TELEPHONE ENCOUNTER
Elvira,    Patient requesting refill for Omeprazole 40mg. Last refilled on 11/29/24.Last seen 9/4/24.

## 2025-03-28 NOTE — TELEPHONE ENCOUNTER
I left detailed message on verbal release verified and identified voicemail # [phone] and made aware of JERZY Galvan's note below; also making aware of [facility] message being sent.

## 2025-04-15 ENCOUNTER — OFFICE VISIT (OUTPATIENT)
Dept: PHYSICAL MEDICINE AND REHAB | Facility: CLINIC | Age: 50
End: 2025-04-15
Payer: COMMERCIAL

## 2025-04-15 VITALS — BODY MASS INDEX: 35.3 KG/M2 | WEIGHT: 187 LBS | HEIGHT: 61 IN

## 2025-04-15 DIAGNOSIS — M67.959 TENDINOPATHY OF GLUTEUS MEDIUS: Primary | ICD-10-CM

## 2025-04-15 DIAGNOSIS — M70.61 GREATER TROCHANTERIC BURSITIS OF BOTH HIPS: ICD-10-CM

## 2025-04-15 DIAGNOSIS — M70.62 GREATER TROCHANTERIC BURSITIS OF BOTH HIPS: ICD-10-CM

## 2025-04-15 PROCEDURE — 99204 OFFICE O/P NEW MOD 45 MIN: CPT | Performed by: PHYSICAL MEDICINE & REHABILITATION

## 2025-04-15 PROCEDURE — 3008F BODY MASS INDEX DOCD: CPT | Performed by: PHYSICAL MEDICINE & REHABILITATION

## 2025-04-15 RX ORDER — MELOXICAM 15 MG/1
15 TABLET ORAL DAILY
Qty: 14 TABLET | Refills: 0 | Status: SHIPPED | OUTPATIENT
Start: 2025-04-15 | End: 2025-04-29

## 2025-04-15 NOTE — PROGRESS NOTES
The following individual(s) verbally consented to be recorded using ambient AI listening technology and understand that they can each withdraw their consent to this listening technology at any point by asking the clinician to turn off or pause the recording:    Patient name: Vianey Cantu  Additional names:  EMILIANO PEÑA

## 2025-04-15 NOTE — PROGRESS NOTES
[facility]  NEW PATIENT EVALUATION    Consultation as a request of Maye Myles      HISTORY OF PRESENT ILLNESS:     Chief Complaint   Patient presents with    New Patient     New patient is here with complaints bilateral  sciatic nerve pain  L>R and was referred by JERZY. Pain started 1 month ago. CT of abdomen and pelvis was completed 1/18/25. states she has difficultly bearing weight and sleeping on the left side due to the pain. Pain radiates down into the ankle. Intermittent n/t. Takes gabapentin and tylenol to ease pain. No current physical therapy.  Pain 8/10        History of Present Illness  The patient, a dental assistant, presents with severe bilateral hip pain, rating it an 8/10. The pain is localized to the lateral hip and radiates down to the ankles. The pain is so severe that it affects her sleep and makes it difficult to put weight on the left leg. The pain has been worsening over the past two weeks and is now present in both hips. The patient denies any weakness or foot drop, and there is no associated back pain. She reports occasional night sweats but denies any fevers, chills, or weight loss.  She has been taking muscle relaxer as prescribed but has not noted any significant change.       PHYSICAL EXAM:   Ht 61\"   Wt 187 lb (84.8 kg)   LMP 03/03/2025 (Approximate)   BMI 35.33 kg/m²       Gait  Able to toe walk and heel walk without any difficulty    HIP:  Inspection: no erythema, swelling, or obvious deformity  Palpation: Tenderness to palpation of the greater trochanter and gluteus medius bilaterally left worse than right and tenderness to palpation of the IT band  ROM: Restricted range of motion in internal and external rotation bilateral hips with bilateral hip tightness noted with DIAMOND testing  Strength: 5/5 in bilateral lower extremities  Sensation: Intact to light touch in all dermatomes of the lower extremities  DIAMOND: Positive  for bilateral hip tightness and lateral hip pain  FADIR: negative for intra-articular hip pain  Nadege: Positive bilaterally  Trendelenberg: Positive for glut med weakness on the lateral side      IMAGING:   None    All imaging results were reviewed and discussed with patient.      ASSESSMENT/PLAN:     1. Tendinopathy of gluteus medius    2. Greater trochanteric bursitis of both hips        Assessment & Plan  Bilateral Hip Bursitis  Bilateral hip bursitis with significant pain due to weak gluteal muscles causing bursa inflammation. Explained chronic nature and potential for recurrence without muscle strengthening. Discussed cortisone injection benefits and risks.  - Initiate physical therapy for four weeks focusing on strengthening gluteal and core muscles.  - Advise icing hips for 15 minutes twice daily.  - Prescribe meloxicam 15 mg daily for two weeks.  - Discontinue muscle relaxers.  - Schedule follow-up in four weeks to assess progress.  - Consider cortisone injection with ultrasound guidance if no improvement after four weeks.       The patient verbalized understanding with the plan and was in agreement. All questions/concerns were addressed and there were no barriers to learning.  Please note Dragon dictation software was used to dictate this note and may result in inadvertent typos.    Suki Carson DO, FAAPMR & CAQSM  Physical Medicine and Rehabilitation  Sports and Spine Medicine    PAST MEDICAL HISTORY:   Past Medical History[1]      PAST SURGICAL HISTORY:   Past Surgical History[2]      CURRENT MEDICATIONS:   Current Medications[3]      ALLERGIES:   Allergies[4]      FAMILY HISTORY:   Family History[5]       SOCIAL HISTORY:   Short Social Hx on File[6]       REVIEW OF SYSTEMS:   Patient-reported ROS  Constitutional  Sleep Disturbance: admits  Chills: denies  Fever: admits  Weight Gain: admits  Weight Loss: denies   Cardiovascular  Chest Pain: denies  Irregular Heartbeat: denies   Respiratory  Painful  Breathing: denies  Wheezing: denies   Gastrointestinal  Bowel Incontinence: denies  Heartburn: admits  Abdominal Pain: denies  Blood in Stool : denies  Rectal Pain: denies   Hematology  Easy Bruising: denies  Easy Bleeding: denies   Genitourinary  Difficulty Urinating: denies  Bladder Incontinence: admits  Pelvic Pain: denies  Painful Urination: denies   Musculoskeletal  Joint Stiffness: admits  Painful Joints: admits  Tailbone Pain: denies  Swollen Joints: denies   Peripheral Vascular  Swelling of Legs/Feet: denies  Cold Extremities: denies   Skin  Open Sores: denies  Nodules or Lumps: denies  Rash: denies   Neurological  Loss of Strength Since last Visit: denies  Tingling/Numbness: denies  Balance: denies   Psychiatric  Anxiety: denies  Depressed Mood: denies       PHYSICAL EXAM:   General: No immediate distress  Head: Normocephalic/ Atraumatic  Eyes: Extra-occular movements intact.   Ears: No auricular hematoma or deformities  Mouth: No lesions or ulcerations  Heart: peripheral pulses intact. Normal capillary refill.   Lungs: Non-labored respirations  Abdomen: No abdominal guarding  Extremities: No lower extremity edema bilaterally   Skin: No lesions noted   Cognition: alert & oriented x 3, attentive, able to follow 2 step commands, comprehention intact, spontaneous speech intact  Psychiatric: Mood and affect appropriate      LABS:     Lab Results   Component Value Date     (H) 06/27/2023    A1C 6.1 (H) 06/27/2023     Lab Results   Component Value Date    WBC 5.7 01/31/2025    RBC 4.84 01/31/2025    HGB 14.1 01/31/2025    HCT 41.8 01/31/2025    MCV 86.4 01/31/2025    MCH 29.1 01/31/2025    MCHC 33.7 01/31/2025    RDW 14.2 01/31/2025    .0 01/31/2025    MPV 7.9 09/22/2018     Lab Results   Component Value Date    GLU 99 01/31/2025    BUN 23 01/31/2025    BUNCREA 31.1 (H) 01/31/2025    CREATSERUM 0.74 01/31/2025    ANIONGAP 10 01/31/2025    GFR >60 02/17/2016    GFRNAA 101 07/23/2022    GFRAA 117  2022    CA 8.9 2025    OSMOCALC 298 (H) 2025    ALKPHO 68 2025    AST 18 2025    ALT 26 2025    ALKPHOS 65 2016    BILT 0.4 2025    TP 6.6 2025    ALB 4.6 2025    GLOBULIN 2.0 2025    AGRATIO 1.4 2016     2025    K 4.1 2025     2025    CO2 26.0 2025     Lab Results   Component Value Date    PTP 13.6 2022    INR 1.03 2022     Lab Results   Component Value Date    VITD 36.6 2024                      [1]   Past Medical History:   Abdominal pain in female    LLQ. Colonoscopy : NL (10-14-15) except hemorrhoids.     Abnormal Pap smear of cervix    CHAYITO 1    Amenorrhea    Anemia    Anxiety    Cyst of buttocks    cyst on left buttocks, removed    Cyst of ovary, right    Decorative tattoo    Elevated liver enzymes    Fatty liver.     Esophageal reflux    S/P Lap. Nissen fundoplication.    Hiatal hernia    High blood pressure    High cholesterol    Incontinence    Umbilical hernia   [2]   Past Surgical History:  Procedure Laterality Date    Bravo 96hr - internal  2013    De[place]eester 30 1st 48, 16 2nd 48          x 3      2006    Cholecystectomy      Colonoscopy  2020    Colonoscopy      Colonoscopy      Colonoscopy N/A 3/11/2024    Procedure: COLONOSCOPY;  Surgeon: Tyrel Thurston MD;  Location: [facility] ENDO    Egd  2013    Egd  2020    Egd      Esophageal manometry - internal  2013    normal    Excis primary ganglion wrist Left     wrist x2    Forearm/wrist surgery unlisted Left     wrist tendon surgery    Hemorrhoidectomy  2015    internal and anal skin tag removeal. Benign.     Hernia surgery      Laparoscopic fundoplasty  2013    hiatal hernia     Daniel localization wire 1 site left (cpt=19281) Left 2017    sebaceous cyst    Other surgical history      Lasik    Other surgical history Left     ankle tendon repair    Tubal ligation     [3]    Current Outpatient Medications   Medication Sig Dispense Refill    Meloxicam (MOBIC) 15 MG Oral Tab Take 1 tablet (15 mg total) by mouth daily for 14 days. 14 tablet 0    Omeprazole 40 MG Oral Capsule Delayed Release Take 1 capsule (40 mg total) by mouth before breakfast. 90 capsule 0    gabapentin 300 MG Oral Cap Take 1 capsule (300 mg total) by mouth nightly. 30 capsule 5    methylPREDNISolone (MEDROL) 4 MG Oral Tablet Therapy Pack As directed. 1 each 0    fluconazole (DIFLUCAN) 150 MG Oral Tab Take 1 tablet (150 mg total) by mouth every third day as needed. (Patient not taking: Reported on 3/13/2025) 2 tablet 0    oseltamivir (TAMIFLU) 75 MG Oral Cap Take 1 capsule (75 mg total) by mouth 2 (two) times daily. (Patient not taking: Reported on 3/13/2025) 10 capsule 0    ibuprofen 600 MG Oral Tab Take 1 tablet (600 mg total) by mouth every 8 (eight) hours as needed for Pain or Fever. 30 tablet 0    oxybutynin ER 10 MG Oral Tablet 24 Hr Take 1 tablet (10 mg total) by mouth daily. 90 tablet 3    metoprolol succinate ER 25 MG Oral Tablet 24 Hr Take 0.5 tablets (12.5 mg total) by mouth daily. 45 tablet 3    FLUoxetine 20 MG Oral Cap Take 1 capsule (20 mg total) by mouth daily. (Patient not taking: Reported on 10/31/2024) 90 capsule 1    simvastatin 20 MG Oral Tab Take 1 tablet (20 mg total) by mouth nightly. 90 tablet 3    saccharomyces boulardii (FLORASTOR) 250 MG Oral Cap Take 1 capsule (250 mg total) by mouth 2 (two) times daily. (Patient not taking: Reported on 3/13/2025) 90 capsule 2   [4] No Known Allergies  [5]   Family History  Problem Relation Age of Onset    Cancer Mother 54        breast cancer     Breast Cancer Mother 54    Cancer Maternal Uncle         melanoma    Diabetes Maternal Grandmother     Stroke Maternal Grandfather     Lipids Father         hyperlipidemia    Diabetes Father         Dad  of liver cancer    Other (NSVT) Father     Cancer Father         Liver cancer    Bleeding Disorders Neg      Clotting Disorder Neg    [6]   Social History  Socioeconomic History    Marital status:    Occupational History    Occupation: Retired. Was dental ass't.    Tobacco Use    Smoking status: Never    Smokeless tobacco: Never   Vaping Use    Vaping status: Never Used   Substance and Sexual Activity    Alcohol use: No    Drug use: Never    Sexual activity: Yes     Partners: Male     Birth control/protection: Tubal Ligation   Other Topics Concern    Caffeine Concern No    Exercise Yes     Comment: walks for 20 minutes, 3 times per week.   Social History Narrative    Noreen is  x 20yrs. She has 3 children. 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 Noreen lives with her family in [place], [place].      Social Drivers of Health     Food Insecurity: No Food Insecurity (2/13/2025)    NCSS - Food Insecurity     Worried About Running Out of Food in the Last Year: No     Ran Out of Food in the Last Year: No   Transportation Needs: No Transportation Needs (2/13/2025)    NCSS - Transportation     Lack of Transportation: No   Housing Stability: Not At Risk (2/13/2025)    NCSS - Housing/Utilities     Has Housing: Yes     Worried About Losing Housing: No     Unable to Get Utilities: No

## 2025-04-15 NOTE — PATIENT INSTRUCTIONS
-Start physical therapy and home exercises  -Mobic daily for the next 7-10 days and then as needed  -Ice twice daily for 10-15 minutes   -Please stop the medication if you have any side effects and call the office if you have any questions or concerns  -If no better will consider injection   -Follow up in 4 weeks

## 2025-04-21 ENCOUNTER — TELEPHONE (OUTPATIENT)
Age: 50
End: 2025-04-21

## 2025-04-21 DIAGNOSIS — D50.0 IRON DEFICIENCY ANEMIA DUE TO CHRONIC BLOOD LOSS: Primary | ICD-10-CM

## 2025-04-21 NOTE — TELEPHONE ENCOUNTER
Called and spoke with Noreen. Told her yes, Dr. Izaguirre does want her to repeat lab work before her follow up appointment on 4/25. Told her I placed those lab orders in and instructed her she does not have to fast. She verbalized understanding and thanked me for the call.

## 2025-04-21 NOTE — TELEPHONE ENCOUNTER
Patient is calling to see if she is suppose to have labs for her appointment on 4/25/25 with Dr. Izaguirre. She would like a call back to discuss this matter.

## 2025-04-22 ENCOUNTER — LAB ENCOUNTER (OUTPATIENT)
Dept: LAB | Facility: HOSPITAL | Age: 50
End: 2025-04-22
Attending: INTERNAL MEDICINE
Payer: COMMERCIAL

## 2025-04-22 DIAGNOSIS — D50.0 IRON DEFICIENCY ANEMIA DUE TO CHRONIC BLOOD LOSS: ICD-10-CM

## 2025-04-22 LAB
BASOPHILS # BLD AUTO: 0.06 X10(3) UL (ref 0–0.2)
BASOPHILS NFR BLD AUTO: 0.8 %
DEPRECATED HBV CORE AB SER IA-ACNC: 169 NG/ML (ref 50–306)
DEPRECATED RDW RBC AUTO: 40.9 FL (ref 35.1–46.3)
EOSINOPHIL # BLD AUTO: 0.03 X10(3) UL (ref 0–0.7)
EOSINOPHIL NFR BLD AUTO: 0.4 %
ERYTHROCYTE [DISTWIDTH] IN BLOOD BY AUTOMATED COUNT: 13.5 % (ref 11–15)
HCT VFR BLD AUTO: 42.5 % (ref 35–48)
HGB BLD-MCNC: 14.3 G/DL (ref 12–16)
IMM GRANULOCYTES # BLD AUTO: 0.11 X10(3) UL (ref 0–1)
IMM GRANULOCYTES NFR BLD: 1.5 %
IRON SATN MFR SERPL: 22 % (ref 15–50)
IRON SERPL-MCNC: 67 UG/DL (ref 50–170)
LYMPHOCYTES # BLD AUTO: 1.45 X10(3) UL (ref 1–4)
LYMPHOCYTES NFR BLD AUTO: 19.9 %
MCH RBC QN AUTO: 27.9 PG (ref 26–34)
MCHC RBC AUTO-ENTMCNC: 33.6 G/DL (ref 31–37)
MCV RBC AUTO: 83 FL (ref 80–100)
MONOCYTES # BLD AUTO: 0.54 X10(3) UL (ref 0.1–1)
MONOCYTES NFR BLD AUTO: 7.4 %
NEUTROPHILS # BLD AUTO: 5.1 X10 (3) UL (ref 1.5–7.7)
NEUTROPHILS # BLD AUTO: 5.1 X10(3) UL (ref 1.5–7.7)
NEUTROPHILS NFR BLD AUTO: 70 %
PLATELET # BLD AUTO: 229 10(3)UL (ref 150–450)
RBC # BLD AUTO: 5.12 X10(6)UL (ref 3.8–5.3)
TOTAL IRON BINDING CAPACITY: 300 UG/DL (ref 250–425)
TRANSFERRIN SERPL-MCNC: 232 MG/DL (ref 250–380)
WBC # BLD AUTO: 7.3 X10(3) UL (ref 4–11)

## 2025-04-22 PROCEDURE — 85025 COMPLETE CBC W/AUTO DIFF WBC: CPT

## 2025-04-22 PROCEDURE — 82728 ASSAY OF FERRITIN: CPT

## 2025-04-22 PROCEDURE — 84466 ASSAY OF TRANSFERRIN: CPT

## 2025-04-22 PROCEDURE — 36415 COLL VENOUS BLD VENIPUNCTURE: CPT

## 2025-04-22 PROCEDURE — 83540 ASSAY OF IRON: CPT

## 2025-04-25 ENCOUNTER — OFFICE VISIT (OUTPATIENT)
Age: 50
End: 2025-04-25
Attending: NURSE PRACTITIONER
Payer: COMMERCIAL

## 2025-04-25 VITALS
HEART RATE: 67 BPM | BODY MASS INDEX: 35.3 KG/M2 | DIASTOLIC BLOOD PRESSURE: 82 MMHG | SYSTOLIC BLOOD PRESSURE: 132 MMHG | WEIGHT: 187 LBS | RESPIRATION RATE: 16 BRPM | OXYGEN SATURATION: 96 % | TEMPERATURE: 98 F | HEIGHT: 61 IN

## 2025-04-25 DIAGNOSIS — Z86.39 HISTORY OF IRON DEFICIENCY: Primary | ICD-10-CM

## 2025-04-25 NOTE — PROGRESS NOTES
Hematology Progress Note    Patient Name: Vianey Cantu   YOB: 1975   Medical Record Number: [id number]   CSN: [id number]   Consulting Physician: Darrion Izaguirre MD  Referring Physician(s): Eber Gonzalez MD  Date of Visit: 4/25/25    Chief complaint:  Iron deficiency anemia    History of Present Illness:     Vianey Cantu is a 49 year old female that was seen today in the hematology suite for evaluation of the above condition. Patient with PMH relevant for anxiety that causes sweating day and night presenting for evaluation of lowered iron levels but no signs of anemia.  Patient reports menstrual blood loss is her only site of bleeding.  She reports her cycles last once a month, not associated with heavy flow. Noreen denies any epistaxis, oral pharyngeal bleeding, melena, hematochezia, hematuria.  This patient does not have symptomatic anemia as she denies symptoms of fatigue, chest pain, dyspnea, lightheadedness or dizziness.  Her review of systems is notable for swelling during the day at night as well as alternating constipation with diarrhea.  Patient mentions that oral iron has caused constipation in the past and she did not like the taste of the pills. ROS is otherwise negative.    9/2024 GI workup colonoscopy and endoscopy negative plus capsule endoscopy inconclusive.      Interval History:  The patient returns for f/u based on hx of iron deficiency anemia. Noreen reports that her menstrual cycle blood loss is minimal and only bleeding source. Denies pica, sob, chest pain, dizziness. No systemic signs of illness.         Past Medical History:  Past Medical History:    Abdominal pain in female    LLQ. Colonoscopy : NL (10-14-15) except hemorrhoids.     Abnormal Pap smear of cervix    CHAYITO 1    Amenorrhea    Anemia    Anxiety    Cyst of buttocks    cyst on left buttocks, removed    Cyst of ovary, right    Decorative tattoo    Elevated liver enzymes    Fatty liver.     Esophageal reflux    S/P Lap.  Nissen fundoplication.    Hiatal hernia    High blood pressure    High cholesterol    Incontinence    Umbilical hernia       Past Surgical History:  Past Surgical History:   Procedure Laterality Date    Bravo 96hr - internal  2013    [place] 30 1st 48, 16 2nd 48          x 3      2006    Cholecystectomy      Colonoscopy  2020    Colonoscopy      Colonoscopy      Colonoscopy N/A 3/11/2024    Procedure: COLONOSCOPY;  Surgeon: Tyrel Thurston MD;  Location: [facility] ENDO    Egd  2013    Egd  2020    Egd      Esophageal manometry - internal  2013    normal    Excis primary ganglion wrist Left     wrist x2    Forearm/wrist surgery unlisted Left     wrist tendon surgery    Hemorrhoidectomy  2015    internal and anal skin tag removeal. Benign.     Hernia surgery      Laparoscopic fundoplasty  2013    hiatal hernia     Daniel localization wire 1 site left (cpt=19281) Left 2017    sebaceous cyst    Other surgical history      Lasik    Other surgical history Left     ankle tendon repair    Tubal ligation         Family Medical History:  Family History   Problem Relation Age of Onset    Cancer Mother 54        breast cancer     Breast Cancer Mother 54    Cancer Maternal Uncle         melanoma    Diabetes Maternal Grandmother     Stroke Maternal Grandfather     Lipids Father         hyperlipidemia    Diabetes Father         Dad  of liver cancer    Other (NSVT) Father     Cancer Father         Liver cancer    Bleeding Disorders Neg     Clotting Disorder Neg        Social History:  Social History     Socioeconomic History    Marital status:      Spouse name: Not on file    Number of children: Not on file    Years of education: Not on file    Highest education level: Not on file   Occupational History    Occupation: Retired. Was dental ass't.    Tobacco Use    Smoking status: Never    Smokeless tobacco: Never   Vaping Use    Vaping status: Never Used    Substance and Sexual Activity    Alcohol use: No    Drug use: Never    Sexual activity: Yes     Partners: Male     Birth control/protection: Tubal Ligation   Other Topics Concern     Service Not Asked    Blood Transfusions Not Asked    Caffeine Concern No    Occupational Exposure Not Asked    Hobby Hazards Not Asked    Sleep Concern Not Asked    Stress Concern Not Asked    Weight Concern Not Asked    Special Diet Not Asked    Back Care Not Asked    Exercise Yes     Comment: walks for 20 minutes, 3 times per week.    Bike Helmet Not Asked    Seat Belt Not Asked    Self-Exams Not Asked   Social History Narrative    Noreen is  x 18yrs. She has 3 children. 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 Noreen lives with her family in [place], [place].      Social Drivers of Health     Food Insecurity: No Food Insecurity (2/13/2025)    NCSS - Food Insecurity     Worried About Running Out of Food in the Last Year: No     Ran Out of Food in the Last Year: No   Transportation Needs: No Transportation Needs (2/13/2025)    NCSS - Transportation     Lack of Transportation: No   Housing Stability: Not At Risk (2/13/2025)    NCSS - Housing/Utilities     Has Housing: Yes     Worried About Losing Housing: No     Unable to Get Utilities: No       Allergies:   No Known Allergies    Current Medications:   Meloxicam (MOBIC) 15 MG Oral Tab Take 1 tablet (15 mg total) by mouth daily for 14 days. 14 tablet 0    Omeprazole 40 MG Oral Capsule Delayed Release Take 1 capsule (40 mg total) by mouth before breakfast. 90 capsule 0    gabapentin 300 MG Oral Cap Take 1 capsule (300 mg total) by mouth nightly. 30 capsule 5    ibuprofen 600 MG Oral Tab Take 1 tablet (600 mg total) by mouth every 8 (eight) hours as needed for Pain or Fever. 30 tablet 0    oxybutynin ER 10 MG Oral Tablet 24 Hr Take 1 tablet (10 mg total) by mouth daily. 90 tablet 3    metoprolol succinate ER 25 MG Oral Tablet 24 Hr Take 0.5 tablets (12.5 mg total) by mouth daily. 45  tablet 3    simvastatin 20 MG Oral Tab Take 1 tablet (20 mg total) by mouth nightly. 90 tablet 3    saccharomyces boulardii (FLORASTOR) 250 MG Oral Cap Take 1 capsule (250 mg total) by mouth 2 (two) times daily. 90 capsule 2       Review of Systems:    Constitutional: Negative for anorexia, chills, fevers, negative intermittent night sweats, decreased appetite, +mild  fatigue  Eyes: Negative for visual disturbance, irritation and redness.  Respiratory: Negative for cough, hemoptysis, chest pain, or dyspnea on exertion.  Gastrointestinal: Negative for dysphagia, odynophagia, reflux symptoms, nausea, vomiting, change in bowel habits, or abdominal pain   Integument/breast: Negative for rash, skin lesions, and pruritus.  Hematologic/lymphatic: Negative for easy bruising, bleeding, and lymphadenopathy.  Musculoskeletal: Negative for myalgias, arthralgias, muscle weakness.  Neurological: Negative for headaches, dizziness, speech problems, gait problems and weakness.    A comprehensive 14 point review of systems was completed.  Pertinent positives and negatives noted in the the HPI.     Vital Signs:  /82 (BP Location: Left arm, Patient Position: Sitting, Cuff Size: adult)   Pulse 67   Temp 98.2 °F (36.8 °C) (Oral)   Resp 16   Ht 1.549 m (5' 1\")   Wt 84.8 kg (187 lb)   LMP 03/03/2025 (Approximate)   SpO2 96%   BMI 35.33 kg/m²   Wt Readings from Last 6 Encounters:   04/25/25 84.8 kg (187 lb)   04/15/25 84.8 kg (187 lb)   03/13/25 85.2 kg (187 lb 12.8 oz)   02/18/25 84.8 kg (187 lb)   02/13/25 86 kg (189 lb 9.6 oz)   01/31/25 85.4 kg (188 lb 3.2 oz)         Physical Examination:    General: Patient is alert and oriented x 3, not in acute distress.  Psych:  Friendly, cooperative with appropriate questions and responses  HEENT: EOMs intact. Oropharynx is clear.   Neck: No palpable lymphadenopathy. Neck is supple.  Lymphatics: There is no palpable lymphadenopathy throughout in the cervical, supraclavicular,  axillary, regions.  Chest: Clear to auscultation. No wheezes or rales.  Heart: Regular rate and rhythm. S1S2 normal.  Abdomen: Soft, non tender with good bowel sounds.  No hepatosplenomegaly.  No palpable mass.  Extremities: No edema or calf tenderness.  Neurological: Grossly intact.      Labs:    Lab Results   Component Value Date/Time    WBC 7.3 04/22/2025 07:59 AM    RBC 5.12 04/22/2025 07:59 AM    HGB 14.3 04/22/2025 07:59 AM    HCT 42.5 04/22/2025 07:59 AM    MCV 83.0 04/22/2025 07:59 AM    MCH 27.9 04/22/2025 07:59 AM    MCHC 33.6 04/22/2025 07:59 AM    RDW 13.5 04/22/2025 07:59 AM    NEPRELIM 5.10 04/22/2025 07:59 AM    .0 04/22/2025 07:59 AM       Lab Results   Component Value Date/Time    GLU 99 01/31/2025 07:04 AM    BUN 23 01/31/2025 07:04 AM    CREATSERUM 0.74 01/31/2025 07:04 AM    GFRNAA 101 07/23/2022 11:13 AM    CA 8.9 01/31/2025 07:04 AM    ALB 4.6 01/31/2025 07:04 AM     01/31/2025 07:04 AM    K 4.1 01/31/2025 07:04 AM     01/31/2025 07:04 AM    CO2 26.0 01/31/2025 07:04 AM    ALKPHO 68 01/31/2025 07:04 AM    AST 18 01/31/2025 07:04 AM    ALT 26 01/31/2025 07:04 AM     Lab Results   Component Value Date    ISHAN 169 04/22/2025     Lab Results   Component Value Date    IRON 67 04/22/2025    TIBC 433 (H) 09/30/2015    IRONSAT 9 (L) 09/30/2015    TRANSFERRIN 232 (L) 04/22/2025    TIBCP 300 04/22/2025    SAT 22 04/22/2025        Impression:      ICD-10-CM    1. Iron deficiency anemia due to chronic blood loss  D50.0 CBC W/DIFF [E]     FERRITIN [E]     IRON AND TIBC [E]          49 year old W with PMH relevant for anxiety that causes sweating day and night presenting for follow up of iron deficiency anemia       Plan:    1.) Iron Deficiency Anemia    --prior endoscopy procedures with Dr. Lenard Harding  --Final pathology from EGD with colonoscopy negative for malignancy on 11/11/2020  --Patient has monthly blood loss with menses as her only site of bleeding, not a vegetarian. Denies blood  loss from any other orifice;had a positive fecal occult blood test last visit in   --she has symptomatic anemia reported as fatigue  --no other causes of anemia noted  --She was  treated with IV iron injectafer in 2024       --Patient to follow up with GI, she reports capsule study was not completed due battery .  She does not want to repeat capsule study at this time. Dr. Thurston recommended Can consider CT enterography study in the future    --rec pRBC for hgb <7  --labs reviewed and improved s/p IV injectafer  --Follow up 8 weeks w/ APN    2.) Fecal Occult blood test with loose stools w/ H.Pylori infection    --suspect this pt may be bleeding from the small bowel. She saw Tyrel Thurston soon in GI in 3/24 and found to have H.Pylori infection, now s/p triple therapy  --dicussed that post tx H.Pylori infection has resolved by stool testing in     3.) anxiety    --She reports anxiety related to father passed from liver cancer, history is noted in family history.   --Discussed to continue follow up with PCP and continue cancer surveillance with mammogram and colonoscopies as recommended.    MDM: Low Risk    Darrion Izaguirre MD  [facility]  [facility] [place]  [address]  [phone]

## 2025-04-27 ENCOUNTER — HOSPITAL ENCOUNTER (OUTPATIENT)
Age: 50
Discharge: HOME | End: 2025-04-27
Payer: COMMERCIAL

## 2025-04-27 VITALS
OXYGEN SATURATION: 98 % | DIASTOLIC BLOOD PRESSURE: 74 MMHG | SYSTOLIC BLOOD PRESSURE: 144 MMHG | RESPIRATION RATE: 18 BRPM | TEMPERATURE: 97 F | HEART RATE: 66 BPM

## 2025-04-27 DIAGNOSIS — R52 BODY ACHES: Primary | ICD-10-CM

## 2025-04-27 DIAGNOSIS — B34.9 VIRAL SYNDROME: ICD-10-CM

## 2025-04-27 LAB
POCT INFLUENZA A: NEGATIVE
POCT INFLUENZA B: NEGATIVE
SARS-COV-2 RNA RESP QL NAA+PROBE: NOT DETECTED

## 2025-04-27 RX ORDER — IBUPROFEN 600 MG/1
600 TABLET, FILM COATED ORAL ONCE
Status: COMPLETED | OUTPATIENT
Start: 2025-04-27 | End: 2025-04-27

## 2025-04-27 RX ORDER — ACETAMINOPHEN 325 MG/1
650 TABLET ORAL ONCE
Status: COMPLETED | OUTPATIENT
Start: 2025-04-27 | End: 2025-04-27

## 2025-05-01 ENCOUNTER — MED REC SCAN ONLY (OUTPATIENT)
Dept: INTERNAL MEDICINE CLINIC | Facility: CLINIC | Age: 50
End: 2025-05-01

## 2025-05-13 ENCOUNTER — OFFICE VISIT (OUTPATIENT)
Dept: PHYSICAL MEDICINE AND REHAB | Facility: CLINIC | Age: 50
End: 2025-05-13
Payer: COMMERCIAL

## 2025-05-13 ENCOUNTER — TELEPHONE (OUTPATIENT)
Dept: PHYSICAL MEDICINE AND REHAB | Facility: CLINIC | Age: 50
End: 2025-05-13

## 2025-05-13 VITALS — HEIGHT: 61 IN | BODY MASS INDEX: 35.3 KG/M2 | WEIGHT: 187 LBS

## 2025-05-13 DIAGNOSIS — M70.62 GREATER TROCHANTERIC BURSITIS OF BOTH HIPS: ICD-10-CM

## 2025-05-13 DIAGNOSIS — M67.959 TENDINOPATHY OF GLUTEUS MEDIUS: Primary | ICD-10-CM

## 2025-05-13 DIAGNOSIS — M70.61 GREATER TROCHANTERIC BURSITIS OF BOTH HIPS: ICD-10-CM

## 2025-05-13 PROCEDURE — 3008F BODY MASS INDEX DOCD: CPT | Performed by: PHYSICAL MEDICINE & REHABILITATION

## 2025-05-13 PROCEDURE — 99214 OFFICE O/P EST MOD 30 MIN: CPT | Performed by: PHYSICAL MEDICINE & REHABILITATION

## 2025-05-13 NOTE — TELEPHONE ENCOUNTER
Initiated authorization for Ultrasound guided bilateral greater trochanteric bursa injection with corticosteroid. CPT/HCPCS 57128-33,  x's 2 dx:m70.61/62 with sunil CUMMINGS with the [employer].    Status: No auth required  Reference/Authorization # [id number]  Valid: FOR THE MONTH OF MAY ONLY    Authorization is not required based on medical necessity, however, is not a guarantee of payment and may be subject to review once claim is submitted.

## 2025-05-13 NOTE — PROGRESS NOTES
[facility]  OFFICE FOLLOW UP EVALUATION      HISTORY OF PRESENT ILLNESS:     Chief Complaint   Patient presents with    Follow - Up     LOV 4/15/25 pt is here for a follow up . No n/t. No current physical therapy. Reports pain to be throbbing and has difficulty sleeping. Pain 8/10       Patient is following up for bilateral hip pain.  She states that she has started noticing some numbness in the legs bilaterally however the pain in the hips is very severe and affects her quality of sleep at night.  Pain is located along the lateral hips.  She rates it to be 8 out of 10.  She denies any changes in bowel bladder or strength.  She has not been able to start physical therapy as there was difficulty scheduling appointment.    PHYSICAL EXAM:   Ht 61\"   Wt 187 lb (84.8 kg)   LMP 04/04/2025 (Approximate)   BMI 35.33 kg/m²     Gait  Able to toe walk and heel walk without any difficulty     HIP:  Inspection: no erythema, swelling, or obvious deformity  Palpation: Tenderness to palpation of the greater trochanter and gluteus medius bilaterally left worse than right and tenderness to palpation of the IT band  ROM: Restricted range of motion in internal and external rotation bilateral hips with bilateral hip tightness noted with DIAMOND testing  Strength: 5/5 in bilateral lower extremities  Sensation: Intact to light touch in all dermatomes of the lower extremities  DIAMOND: Positive for bilateral hip tightness and lateral hip pain  FADIR: negative for intra-articular hip pain  Nadege: Positive bilaterally  Trendelenberg: Positive for glut med weakness on the lateral side    IMAGING:     None    All imaging results were reviewed and discussed with patient.      ASSESSMENT/PLAN:     1. Tendinopathy of gluteus medius    2. Greater trochanteric bursitis of both hips        Vianey Cantu is a 49 year old female following up for bilateral leg pain in the lateral hips as well as low lateral  thighs.  She may have a component of lumbar radiculopathy or spinal stenosis however pain is reproduced with palpation of the greater trochanter and gluteus medius and is severe in nature.  I recommended bilateral ultrasound-guided greater trochanteric bursa and gluteus medius tendon injection with corticosteroid.  Recommend she start PT program with home exercises as well and apply topical treatment with ice and heat as tolerated.  I advised patient my office reach out to her once the injections approved      The patient verbalized understanding with the plan and was in agreement. All questions/concerns were addressed and there were no barriers to learning.  Please note Dragon dictation software was used to dictate this note and may result in inadvertent typos.    Suki Carson DO, FAAPMR & CAQSM  Physical Medicine and Rehabilitation  Sports and Spine Medicine    PAST MEDICAL HISTORY:   Past Medical History[1]      PAST SURGICAL HISTORY:   Past Surgical History[2]      CURRENT MEDICATIONS:   Current Medications[3]      ALLERGIES:   Allergies[4]      FAMILY HISTORY:   Family History[5]       SOCIAL HISTORY:   Short Social Hx on File[6]       REVIEW OF SYSTEMS:   A comprehensive 10 point review of systems was completed.  Pertinent positives and negatives noted in the the HPI.      LABS:     Lab Results   Component Value Date     (H) 06/27/2023    A1C 6.1 (H) 06/27/2023     Lab Results   Component Value Date    WBC 7.3 04/22/2025    RBC 5.12 04/22/2025    HGB 14.3 04/22/2025    HCT 42.5 04/22/2025    MCV 83.0 04/22/2025    MCH 27.9 04/22/2025    MCHC 33.6 04/22/2025    RDW 13.5 04/22/2025    .0 04/22/2025    MPV 7.9 09/22/2018     Lab Results   Component Value Date    GLU 99 01/31/2025    BUN 23 01/31/2025    BUNCREA 31.1 (H) 01/31/2025    CREATSERUM 0.74 01/31/2025    ANIONGAP 10 01/31/2025    GFR >60 02/17/2016    GFRNAA 101 07/23/2022    GFRAA 117 07/23/2022    CA 8.9 01/31/2025    OSMOCALC 298 (H)  2025    ALKPHO 68 2025    AST 18 2025    ALT 26 2025    ALKPHOS 65 2016    BILT 0.4 2025    TP 6.6 2025    ALB 4.6 2025    GLOBULIN 2.0 2025    AGRATIO 1.4 2016     2025    K 4.1 2025     2025    CO2 26.0 2025     Lab Results   Component Value Date    PTP 13.6 2022    INR 1.03 2022     Lab Results   Component Value Date    VITD 36.6 2024              [1]   Past Medical History:   Abdominal pain in female    LLQ. Colonoscopy : NL (10-14-15) except hemorrhoids.     Abnormal Pap smear of cervix    CHAYITO 1    Amenorrhea    Anemia    Anxiety    Cyst of buttocks    cyst on left buttocks, removed    Cyst of ovary, right    Decorative tattoo    Elevated liver enzymes    Fatty liver.     Esophageal reflux    S/P Lap. Nissen fundoplication.    Hiatal hernia    High blood pressure    High cholesterol    Incontinence    Umbilical hernia   [2]   Past Surgical History:  Procedure Laterality Date    Bravo 96hr - internal  2013    [place]ter 30 1st 48, 16 2nd 48          x 3      2006    Cholecystectomy      Colonoscopy  2020    Colonoscopy      Colonoscopy      Colonoscopy N/A 3/11/2024    Procedure: COLONOSCOPY;  Surgeon: Tyrel Thurston MD;  Location: [facility] ENDO    Egd  2013    Egd  2020    Egd      Esophageal manometry - internal  2013    normal    Excis primary ganglion wrist Left     wrist x2    Forearm/wrist surgery unlisted Left     wrist tendon surgery    Hemorrhoidectomy  2015    internal and anal skin tag removeal. Benign.     Hernia surgery      Laparoscopic fundoplasty  2013    hiatal hernia     Daniel localization wire 1 site left (cpt=19281) Left 2017    sebaceous cyst    Other surgical history      Lasik    Other surgical history Left     ankle tendon repair    Tubal ligation     [3]   Current Outpatient Medications   Medication Sig Dispense  Refill    Omeprazole 40 MG Oral Capsule Delayed Release Take 1 capsule (40 mg total) by mouth before breakfast. 90 capsule 0    gabapentin 300 MG Oral Cap Take 1 capsule (300 mg total) by mouth nightly. 30 capsule 5    ibuprofen 600 MG Oral Tab Take 1 tablet (600 mg total) by mouth every 8 (eight) hours as needed for Pain or Fever. 30 tablet 0    oxybutynin ER 10 MG Oral Tablet 24 Hr Take 1 tablet (10 mg total) by mouth daily. 90 tablet 3    metoprolol succinate ER 25 MG Oral Tablet 24 Hr Take 0.5 tablets (12.5 mg total) by mouth daily. 45 tablet 3    simvastatin 20 MG Oral Tab Take 1 tablet (20 mg total) by mouth nightly. 90 tablet 3    saccharomyces boulardii (FLORASTOR) 250 MG Oral Cap Take 1 capsule (250 mg total) by mouth 2 (two) times daily. 90 capsule 2   [4] No Known Allergies  [5]   Family History  Problem Relation Age of Onset    Cancer Mother 54        breast cancer     Breast Cancer Mother 54    Cancer Maternal Uncle         melanoma    Diabetes Maternal Grandmother     Stroke Maternal Grandfather     Lipids Father         hyperlipidemia    Diabetes Father         Dad  of liver cancer    Other (NSVT) Father     Cancer Father         Liver cancer    Bleeding Disorders Neg     Clotting Disorder Neg    [6]   Social History  Socioeconomic History    Marital status:    Occupational History    Occupation: Retired. Was dental ass't.    Tobacco Use    Smoking status: Never    Smokeless tobacco: Never   Vaping Use    Vaping status: Never Used   Substance and Sexual Activity    Alcohol use: No    Drug use: Never    Sexual activity: Yes     Partners: Male     Birth control/protection: Tubal Ligation   Other Topics Concern    Caffeine Concern No    Exercise Yes     Comment: walks for 20 minutes, 3 times per week.   Social History Narrative    Noreen is  x 18yrs. She has 3 children. 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 Noreen lives with her family in [place], [place].      [employer]     Food  Insecurity: No Food Insecurity (2/13/2025)    NCSS - Food Insecurity     Worried About Running Out of Food in the Last Year: No     Ran Out of Food in the Last Year: No   Transportation Needs: No Transportation Needs (2/13/2025)    NCSS - Transportation     Lack of Transportation: No   Housing Stability: Not At Risk (2/13/2025)    NCSS - Housing/Utilities     Has Housing: Yes     Worried About Losing Housing: No     Unable to Get Utilities: No

## 2025-05-15 ENCOUNTER — OFFICE VISIT (OUTPATIENT)
Dept: INTERNAL MEDICINE CLINIC | Facility: CLINIC | Age: 50
End: 2025-05-15
Payer: COMMERCIAL

## 2025-05-15 VITALS
HEART RATE: 56 BPM | HEIGHT: 61 IN | BODY MASS INDEX: 36.13 KG/M2 | OXYGEN SATURATION: 93 % | WEIGHT: 191.38 LBS | SYSTOLIC BLOOD PRESSURE: 135 MMHG | DIASTOLIC BLOOD PRESSURE: 70 MMHG | TEMPERATURE: 97 F | RESPIRATION RATE: 15 BRPM

## 2025-05-15 DIAGNOSIS — Z12.31 ENCOUNTER FOR SCREENING MAMMOGRAM FOR MALIGNANT NEOPLASM OF BREAST: ICD-10-CM

## 2025-05-15 DIAGNOSIS — Z13.6 ENCOUNTER FOR SCREENING FOR CORONARY ARTERY DISEASE: Primary | ICD-10-CM

## 2025-05-15 DIAGNOSIS — M54.32 LEFT SCIATIC NERVE PAIN: ICD-10-CM

## 2025-05-15 PROCEDURE — 3078F DIAST BP <80 MM HG: CPT | Performed by: NURSE PRACTITIONER

## 2025-05-15 PROCEDURE — 3008F BODY MASS INDEX DOCD: CPT | Performed by: NURSE PRACTITIONER

## 2025-05-15 PROCEDURE — 3075F SYST BP GE 130 - 139MM HG: CPT | Performed by: NURSE PRACTITIONER

## 2025-05-15 PROCEDURE — 99214 OFFICE O/P EST MOD 30 MIN: CPT | Performed by: NURSE PRACTITIONER

## 2025-05-15 NOTE — ASSESSMENT & PLAN NOTE
Bilateral sciatic pain- Constant    Plan  On Mobic  She states the Gabapentin helps at night so she is taking this.  Will start P.T.  Will follow up with Dr. Suki Carson

## 2025-05-15 NOTE — PROGRESS NOTES
HPI:      Patient ID: Vianey Cantu is a 49 year old female.    HPI Follow up Bilateral sciatic nerve pain  49 year old female I last saw for left sciatic nerve pain.  Patient is having constant pain.bilateral legs  She has seen Dr. Suki Carson and is scheduled for P.T.  She is taking Mobic daily.  Feels she needs to follow up with Physiatry sooner.    Requesting order for mammogram    Requesting order for  CT calcium score  She had a score of 44 in  and is on a statin.          Blood pressure 135/70, pulse 56, temperature 97 °F (36.1 °C), temperature source Temporal, resp. rate 15, height 5' 1\" (1.549 m), weight 191 lb 6.4 oz (86.8 kg), last menstrual period 2025, SpO2 93%, not currently breastfeeding.   Immunization History   Administered Date(s) Administered    Covid-19 Vaccine Pfizer 30 mcg/0.3 ml 2021, 2021, 2021    FLU VAC QIV SPLIT 3 YRS AND OLDER (69891) 2017    FLULAVAL 6 months & older 0.5 ml Prefilled syringe (39319) 2017, 2019, 10/20/2020, 10/28/2021    FLUZONE 6 months and older PFS 0.5 ml (11084) 2017    Fluvirin, 3 Years & >, Im 10/15/2010    Influenza 10/03/2013    TDAP 2019       Past Medical History:    Abdominal pain in female    LLQ. Colonoscopy : NL (10-14-15) except hemorrhoids.     Abnormal Pap smear of cervix    CHAYITO 1    Amenorrhea    Anemia    Anxiety    Cyst of buttocks    cyst on left buttocks, removed    Cyst of ovary, right    Decorative tattoo    Elevated liver enzymes    Fatty liver.     Esophageal reflux    S/P Lap. Nissen fundoplication.    Hiatal hernia    High blood pressure    High cholesterol    Incontinence    Umbilical hernia      Past Surgical History:   Procedure Laterality Date    Bravo 96hr - internal  2013    DeMeester 30 1st 48, 16 2nd 48          x 3      2006    Cholecystectomy      Colonoscopy  2020    Colonoscopy      Colonoscopy      Colonoscopy N/A 3/11/2024    Procedure:  COLONOSCOPY;  Surgeon: Tyrel Thurston MD;  Location: [facility] ENDO    Egd  11/2013    Egd  11/2020    Egd      Esophageal manometry - internal  11/2013    normal    Excis primary ganglion wrist Left     wrist x2    Forearm/wrist surgery unlisted Left     wrist tendon surgery    Hemorrhoidectomy  11/2015    internal and anal skin tag removeal. Benign.     Hernia surgery      Laparoscopic fundoplasty  11/27/2013    hiatal hernia     Daniel localization wire 1 site left (cpt=19281) Left 07/2017    sebaceous cyst    Other surgical history      Lasik    Other surgical history Left     ankle tendon repair    Tubal ligation  2014      Social History     Socioeconomic History    Marital status:    Occupational History    Occupation: Retired. Was dental ass't.    Tobacco Use    Smoking status: Never    Smokeless tobacco: Never   Vaping Use    Vaping status: Never Used   Substance and Sexual Activity    Alcohol use: No    Drug use: Never    Sexual activity: Yes     Partners: Male     Birth control/protection: Tubal Ligation   Other Topics Concern    Caffeine Concern No    Exercise Yes     Comment: walks for 20 minutes, 3 times per week.          Review of Systems   Constitutional:  Negative for chills, fatigue and fever.   HENT:  Negative for congestion, ear discharge, ear pain, facial swelling, hearing loss, postnasal drip, sinus pressure, sore throat and trouble swallowing.    Eyes:  Negative for pain, discharge, redness and visual disturbance.   Respiratory:  Negative for cough, chest tightness, shortness of breath and wheezing.    Cardiovascular:  Negative for chest pain, palpitations and leg swelling.   Gastrointestinal:  Negative for abdominal distention, abdominal pain, constipation, diarrhea, nausea and vomiting.   Endocrine: Negative for cold intolerance, heat intolerance, polydipsia, polyphagia and polyuria.   Genitourinary:  Negative for difficulty urinating, dysuria, pelvic pain and vaginal bleeding.    Musculoskeletal:  Negative for back pain, gait problem, neck pain and neck stiffness.   Skin:  Negative for color change and rash.   Neurological:  Negative for dizziness, seizures, weakness and headaches.   Psychiatric/Behavioral:  Negative for agitation and sleep disturbance. The patient is not nervous/anxious.               Current Outpatient Medications   Medication Sig Dispense Refill    Omeprazole 40 MG Oral Capsule Delayed Release Take 1 capsule (40 mg total) by mouth before breakfast. 90 capsule 0    gabapentin 300 MG Oral Cap Take 1 capsule (300 mg total) by mouth nightly. 30 capsule 5    ibuprofen 600 MG Oral Tab Take 1 tablet (600 mg total) by mouth every 8 (eight) hours as needed for Pain or Fever. 30 tablet 0    oxybutynin ER 10 MG Oral Tablet 24 Hr Take 1 tablet (10 mg total) by mouth daily. 90 tablet 3    metoprolol succinate ER 25 MG Oral Tablet 24 Hr Take 0.5 tablets (12.5 mg total) by mouth daily. 45 tablet 3    simvastatin 20 MG Oral Tab Take 1 tablet (20 mg total) by mouth nightly. 90 tablet 3    saccharomyces boulardii (FLORASTOR) 250 MG Oral Cap Take 1 capsule (250 mg total) by mouth 2 (two) times daily. 90 capsule 2     Allergies:No Known Allergies   PHYSICAL EXAM:   Physical Exam  Constitutional:       Appearance: Normal appearance. She is well-developed.   HENT:      Head: Normocephalic.   Cardiovascular:      Rate and Rhythm: Normal rate and regular rhythm.      Heart sounds: Normal heart sounds. No murmur heard.     No friction rub. No gallop.   Pulmonary:      Effort: Pulmonary effort is normal. No respiratory distress.      Breath sounds: Normal breath sounds. No wheezing, rhonchi or rales.   Abdominal:      General: Bowel sounds are normal. There is no distension.      Palpations: Abdomen is soft. There is no mass.      Tenderness: There is no abdominal tenderness. There is no right CVA tenderness, left CVA tenderness or guarding.   Musculoskeletal:         General: No tenderness.       Cervical back: Normal range of motion and neck supple. No tenderness.      Right lower leg: No edema.      Left lower leg: No edema.   Lymphadenopathy:      Cervical: No cervical adenopathy.   Skin:     General: Skin is warm and dry.      Findings: No rash.   Neurological:      Mental Status: She is alert and oriented to person, place, and time.      Coordination: Coordination normal.      Gait: Gait normal.   Psychiatric:         Mood and Affect: Mood normal.         Behavior: Behavior normal.         Thought Content: Thought content normal.         Judgment: Judgment normal.       /70 (BP Location: Right arm, Patient Position: Sitting, Cuff Size: adult)   Pulse 56   Temp 97 °F (36.1 °C) (Temporal)   Resp 15   Ht 5' 1\" (1.549 m)   Wt 191 lb 6.4 oz (86.8 kg)   LMP 04/04/2025 (Approximate)   SpO2 93%   BMI 36.16 kg/m²   Wt Readings from Last 2 Encounters:   05/15/25 191 lb 6.4 oz (86.8 kg)   05/13/25 187 lb (84.8 kg)     Body mass index is 36.16 kg/m².(2)  Lab Results   Component Value Date    WBC 7.3 04/22/2025    RBC 5.12 04/22/2025    HGB 14.3 04/22/2025    HCT 42.5 04/22/2025    MCV 83.0 04/22/2025    MCH 27.9 04/22/2025    MCHC 33.6 04/22/2025    RDW 13.5 04/22/2025    .0 04/22/2025    MPV 7.9 09/22/2018      Lab Results   Component Value Date    GLU 99 01/31/2025    BUN 23 01/31/2025    BUNCREA 31.1 (H) 01/31/2025    CREATSERUM 0.74 01/31/2025    ANIONGAP 10 01/31/2025    GFR >60 02/17/2016    GFRNAA 101 07/23/2022    GFRAA 117 07/23/2022    CA 8.9 01/31/2025    OSMOCALC 298 (H) 01/31/2025    ALKPHO 68 01/31/2025    AST 18 01/31/2025    ALT 26 01/31/2025    ALKPHOS 65 06/16/2016    BILT 0.4 01/31/2025    TP 6.6 01/31/2025    ALB 4.6 01/31/2025    GLOBULIN 2.0 01/31/2025    AGRATIO 1.4 06/16/2016     01/31/2025    K 4.1 01/31/2025     01/31/2025    CO2 26.0 01/31/2025      Lab Results   Component Value Date     (H) 06/27/2023    A1C 6.1 (H) 06/27/2023      Lab Results    Component Value Date    CHOLEST 148 07/11/2024    TRIG 74 07/11/2024    HDL 50 07/11/2024    LDL 83 07/11/2024    VLDL 12 07/11/2024    NONHDLC 98 07/11/2024    CALCNONHDL 134 (H) 07/01/2015      Lab Results   Component Value Date    T4F 0.80 02/15/2013    TSH 0.702 07/11/2024                ASSESSMENT/PLAN:     Assessment & Plan  Encounter for screening for coronary artery disease    Orders:    CT CALCIUM SCORING; Future    Encounter for screening mammogram for malignant neoplasm of breast    Orders:    [facility] RUBEN 2D+3D SCREENING BILAT (CPT=77067/66355); Future    Left sciatic nerve pain  Bilateral sciatic pain- Constant    Plan  On Mobic  She states the Gabapentin helps at night so she is taking this.  Will start P.T.  Will follow up with Dr. Suki Carson            No orders of the defined types were placed in this encounter.      Meds This Visit:  Requested Prescriptions      No prescriptions requested or ordered in this encounter       Imaging & Referrals:  CT CALCIUM SCORING  [facility] RUBEN 2D+3D SCREENING BILAT (CPT=77067/22504)         JERZY Galvan

## 2025-06-03 ENCOUNTER — OFFICE VISIT (OUTPATIENT)
Dept: PHYSICAL MEDICINE AND REHAB | Facility: CLINIC | Age: 50
End: 2025-06-03
Payer: COMMERCIAL

## 2025-06-03 DIAGNOSIS — M54.32 LEFT SCIATIC NERVE PAIN: ICD-10-CM

## 2025-06-03 DIAGNOSIS — M79.18 BUTTOCK PAIN: Primary | ICD-10-CM

## 2025-06-03 DIAGNOSIS — M70.62 GREATER TROCHANTERIC BURSITIS OF BOTH HIPS: ICD-10-CM

## 2025-06-03 DIAGNOSIS — M70.61 GREATER TROCHANTERIC BURSITIS OF BOTH HIPS: ICD-10-CM

## 2025-06-03 DIAGNOSIS — M67.959 TENDINOPATHY OF GLUTEUS MEDIUS: ICD-10-CM

## 2025-06-03 PROCEDURE — 76942 ECHO GUIDE FOR BIOPSY: CPT | Performed by: PHYSICAL MEDICINE & REHABILITATION

## 2025-06-03 PROCEDURE — 20551 NJX 1 TENDON ORIGIN/INSJ: CPT | Performed by: PHYSICAL MEDICINE & REHABILITATION

## 2025-06-03 RX ORDER — TRIAMCINOLONE ACETONIDE 40 MG/ML
80 INJECTION, SUSPENSION INTRA-ARTICULAR; INTRAMUSCULAR ONCE
Status: COMPLETED | OUTPATIENT
Start: 2025-06-03 | End: 2025-06-03

## 2025-06-03 RX ORDER — LIDOCAINE HYDROCHLORIDE 10 MG/ML
14 INJECTION, SOLUTION INFILTRATION; PERINEURAL ONCE
Status: COMPLETED | OUTPATIENT
Start: 2025-06-03 | End: 2025-06-03

## 2025-06-03 NOTE — PROCEDURES
Procedure:  Ultrasound guided BILATERAL greater trochanteric bursa injection  The risks, benefits and anticipated outcomes of the procedure, the risks and benefits of the alternatives to the procedure, and the roles and tasks of the personnel to be involved, were discussed with the patient, and the patient consents to the procedure and agrees to proceed.  UNIVERSAL PROTOCOL / SAFETY CHECKLIST  Sign in Communication: Completed  Time Out:  Team Confirms the Correct Patient, Correct Procedure, Correct Site and Site Marking, Correct Position (if applicable), Prep and Dry Time (if applicable).    Affirmation of Time Out: YES    Sign Out Discussion: Completed if applicable  The procedure was carried out under sterile prep  with sterile gel.  A 27 ga 1&1/4in needle was introduced and advanced with ultrasound guidance for skin anesthesia with 3 cc of 1% lidocaine.  Then, again with real time ultrasound guidance, a 22 gauge 3.5 in needle was advanced from lateral to medial and posterior to anterior with the transducer in transverse orientation into the greater trochanteric bursa.  Following negative aspiration, a mixture of 4 cc of 1% lidocaine and 1 cc of Kenalog (40mg/ml) was injected.  Permanent images were taken and stored in the PACS system.     Ultrasound interpretation was performed prior to the procedure to identify the target and any adjacent neurovascular structures.  Subsequently, interpretation was performed during real-time needle guidance confirming placement.  Post-intervention interpretation was also performed confirming appropriate injectate flow and hemostasis. The patient tolerated the procedure without complication and was instructed in post-procedure precautions.  See patient instructions.     Suki Carson DO, FAAPMR & CAQSM  Physical Medicine and Rehabilitation/Sports Medicine  [facility]

## 2025-06-11 ENCOUNTER — MED REC SCAN ONLY (OUTPATIENT)
Dept: PHYSICAL MEDICINE AND REHAB | Facility: CLINIC | Age: 50
End: 2025-06-11

## 2025-06-17 RX ORDER — OMEPRAZOLE 40 MG/1
40 CAPSULE, DELAYED RELEASE ORAL
Qty: 30 CAPSULE | Refills: 0 | Status: SHIPPED | OUTPATIENT
Start: 2025-06-17

## 2025-06-17 NOTE — TELEPHONE ENCOUNTER
Requested Prescriptions     Pending Prescriptions Disp Refills    OMEPRAZOLE 40 MG Oral Capsule Delayed Release [Pharmacy Med Name: OMEPRAZOLE 40 MG ORAL CAPSULE DELAYED RELEASE] 30 capsule 0     Sig: TAKE 1 CAPSULE (40 MG TOTAL) BY MOUTH EVERY MORNING BEFORE BREAKFAST.         LOV: 09/04/2024  LR: 03/27/2025    gb

## 2025-06-19 RX ORDER — MELOXICAM 15 MG/1
15 TABLET ORAL DAILY
Qty: 14 TABLET | Refills: 0 | Status: SHIPPED | OUTPATIENT
Start: 2025-06-19 | End: 2025-07-03

## 2025-06-20 ENCOUNTER — HOSPITAL ENCOUNTER (OUTPATIENT)
Dept: CT IMAGING | Age: 50
Discharge: HOME | End: 2025-06-20
Attending: NURSE PRACTITIONER

## 2025-06-20 VITALS — HEIGHT: 61 IN | BODY MASS INDEX: 36.06 KG/M2 | WEIGHT: 191 LBS

## 2025-06-20 DIAGNOSIS — Z13.6 ENCOUNTER FOR SCREENING FOR CORONARY ARTERY DISEASE: ICD-10-CM

## 2025-06-20 DIAGNOSIS — Z13.6 SCREENING FOR HEART DISEASE: ICD-10-CM

## 2025-06-20 LAB
POCT GLUCOSE CHOLESTECH: 125 (ref 70–99)
POCT HDL: 44 (ref 40–60)
POCT LDL: 89 (ref 0–99)
POCT TOTAL CHOLESTEROL: 160 (ref 110–200)
POCT TRIGLYCERIDES: 133 (ref 1–149)

## 2025-06-20 NOTE — PROGRESS NOTES
Date of Service 6/20/2025    VIANEY CANTU  Date of Birth 6/28/1975    Patient Age: 49 year old    PCP: Eber Gonzalez MD  [address] [place] [id number]    Heart Scan Consult  Preliminary Heart Scan Score: 74.7    Previous Screening  Heart Scan Completed Previously: Yes  Year of last heart scan: 2022  Score of last heart scan: 44  Peripheral Vascular Scan Completed Previously: No          Risk Factors  Personal Risk Factors  Non-alterable Risk Factors: Personal History, Family History (Father had high cholesterol, patient has high cholesterol and is on statin)  Alterable Risk Factors: Lack of exercise, Abnormal Cholesterol, Unhealthy eating, Obesity      Body Mass Index  Body mass index is 36.09 kg/m².    Blood Pressure  Blood Pressure measurement declined during this encounter.  (Normal =< 120/80,  Elevated = 120-129/ >80,  High Stage1 130-139/80-89 , Stage2 >140/>90)    Lipid Profile  Patient was in fasting state: No    Cholesterol: 160, done on 6/20/2025.  HDL Cholesterol: 44, done on 6/20/2025.  LDL Cholesterol: 89, done on 6/20/2025.  TriGlycerides 133, done on 6/20/2025.    Cholesterol Goals  Value   Total  =< 200   HDL  = > 45 Men = > 55 Women   LDL   =< 100   Triglycerides  =< 150       Glucose and Hemoglobin A1C  Lab Results   Component Value Date    PGLU 125 (A) 06/20/2025    GLU 99 01/31/2025    A1C 6.1 (H) 06/27/2023     (Normal Fasting Glucose < 100mg/dl )    Nurse Review  Risk factor information and results reviewed with Nurse: Yes    Recommended Follow Up:  Consult your physician regarding:: Final Heart Scan Report, Discuss potential for Incidental Finding, Discuss Potential for Score Variance      Recommendations for Change:  Nutrition Changes: Low Saturated Fat (Patients reports eating beef daily, some pork and chicken. Encouraged patient to limit beef and increase chicken and fish. Encouraged nuts for snacks and limit fast food.)    Cholesterol Modification (goal of therapy depends upon  your risk): Increase HDL (Healthy/Good) Normal >45 Men >55 Women    Exercise: Initiate Program (Discussed setting attainable goals for exercise and build to 150 minutes per week.)    Smoking Cessation: No Change Needed    Weight Management: Decrease Current Weight    Stress Management: Adopt Stress Management Techniques    Repeat Heart Scan: 3 Years if Calcium Score is > 0.0, Discuss with your Physician              [place] Recommended Resources:  Recommended Resources: Upcoming Classes, Medical Services and Health Library [email or web address], "[employer] Weight Management [phone] ([facility]), [facility] Your Health [phone]            Lianne HAYWOOD RN        Please Contact the Nurse Heart Line with any Questions or Concerns [phone].

## 2025-06-20 NOTE — ADDENDUM NOTE
Encounter addended by: Lianne Mccoy RN on: 6/20/2025 3:50 PM   Actions taken: Order list changed, Result filed, Clinical Note Signed

## 2025-06-20 NOTE — PROGRESS NOTES
Date of Service 6/20/2025    VIANEY CANTU  Date of Birth 6/28/1975    Patient Age: 49 year old    PCP: Eber Gonzalez MD  [address] [place] [id number]    Heart Scan Consult  Preliminary Heart Scan Score: 74.7    Previous Screening  Heart Scan Completed Previously: Yes  Year of last heart scan: 2022  Score of last heart scan: 44  Peripheral Vascular Scan Completed Previously: No          Risk Factors  Personal Risk Factors  Non-alterable Risk Factors: Personal History, Family History (Father had high cholesterol, patient has high cholesterol and is on statin)  Alterable Risk Factors: Lack of exercise, Abnormal Cholesterol, Unhealthy eating, Obesity      Body Mass Index  Body mass index is 36.09 kg/m².    Blood Pressure  Blood Pressure measurement declined during this encounter.  (Normal =< 120/80,  Elevated = 120-129/ >80,  High Stage1 130-139/80-89 , Stage2 >140/>90)    Lipid Profile  Cholesterol: 148, done on 7/11/2024.  HDL Cholesterol: 50, done on 7/11/2024.  LDL Cholesterol: 83, done on 7/11/2024.  TriGlycerides 74, done on 7/11/2024.    Cholesterol Goals  Value   Total  =< 200   HDL  = > 45 Men = > 55 Women   LDL   =< 100   Triglycerides  =< 150       Glucose and Hemoglobin A1C  Lab Results   Component Value Date    GLU 99 01/31/2025    A1C 6.1 (H) 06/27/2023     (Normal Fasting Glucose < 100mg/dl )    Nurse Review  Risk factor information and results reviewed with Nurse: Yes    Recommended Follow Up:  Consult your physician regarding:: Final Heart Scan Report, Discuss potential for Incidental Finding, Discuss Potential for Score Variance      Recommendations for Change:  Nutrition Changes: Low Saturated Fat (Patients reports eating beef daily, some pork and chicken. Encouraged patient to limit beef and increase chicken and fish. Encouraged nuts for snacks and limit fast food.)    Cholesterol Modification (goal of therapy depends upon your risk): Increase HDL (Healthy/Good) Normal >45 Men >55  Women    Exercise: Initiate Program (Discussed setting attainable goals for exercise and build to 150 minutes per week.)    Smoking Cessation: No Change Needed    Weight Management: Decrease Current Weight    Stress Management: Adopt Stress Management Techniques    Repeat Heart Scan: 3 Years if Calcium Score is > 0.0, Discuss with your Physician              [place] Recommended Resources:  Recommended Resources: Upcoming Classes, Medical Services and Health Library [email or web address], [employer] Weight Management [phone] ([facility]), [facility] Your Health [phone]            Lianne HAYWOOD RN        Please Contact the Nurse Heart Line with any Questions or Concerns [phone].

## 2025-06-22 ENCOUNTER — HOSPITAL ENCOUNTER (OUTPATIENT)
Age: 50
Discharge: HOME | End: 2025-06-22
Payer: COMMERCIAL

## 2025-06-22 VITALS
HEART RATE: 76 BPM | SYSTOLIC BLOOD PRESSURE: 151 MMHG | DIASTOLIC BLOOD PRESSURE: 80 MMHG | RESPIRATION RATE: 18 BRPM | TEMPERATURE: 98 F | OXYGEN SATURATION: 97 %

## 2025-06-22 DIAGNOSIS — R20.0 ANESTHESIA OF SKIN: Primary | ICD-10-CM

## 2025-06-22 PROCEDURE — 99213 OFFICE O/P EST LOW 20 MIN: CPT | Performed by: PHYSICIAN ASSISTANT

## 2025-06-22 NOTE — ED PROVIDER NOTES
Patient Seen in: [facility]        History  Chief Complaint   Patient presents with    Numbness     Stated Complaint: toe numbness    Subjective:   HPI            Patient is a 49-year-old female who presents to immediate care due to dental anesthesia x 1 week.  Patient describes right great toe numbness over the past week.  Denies known injury or fall.  Denies skin color changes,.  Patient currently in PT for bilateral neuropathy of lower extremities.      Objective:     Past Medical History:    Abdominal pain in female    LLQ. Colonoscopy : NL (10-14-15) except hemorrhoids.     Abnormal Pap smear of cervix    CHAYITO 1    Amenorrhea    Anemia    Anxiety    Cyst of buttocks    cyst on left buttocks, removed    Cyst of ovary, right    Decorative tattoo    Elevated liver enzymes    Fatty liver.     Esophageal reflux    S/P Lap. Nissen fundoplication.    Hiatal hernia    High blood pressure    High cholesterol    Incontinence    Umbilical hernia              Past Surgical History:   Procedure Laterality Date    Bravo 96hr - internal  2013    De[place]ter 30 1st 48, 16 2nd 48          x 3      2006    Cholecystectomy      Colonoscopy  2020    Colonoscopy      Colonoscopy      Colonoscopy N/A 3/11/2024    Procedure: COLONOSCOPY;  Surgeon: Tyrel Thurston MD;  Location: [facility] ENDO    Egd  2013    Egd  2020    Egd      Esophageal manometry - internal  2013    normal    Excis primary ganglion wrist Left     wrist x2    Forearm/wrist surgery unlisted Left     wrist tendon surgery    Hemorrhoidectomy  2015    internal and anal skin tag removeal. Benign.     Hernia surgery      Laparoscopic fundoplasty  2013    hiatal hernia     Daniel localization wire 1 site left (cpt=19281) Left 2017    sebaceous cyst    Other surgical history      Lasik    Other surgical history Left     ankle tendon repair    Tubal ligation                  Social History      Socioeconomic History    Marital status:    Occupational History    Occupation: Retired. Was dental ass't.    Tobacco Use    Smoking status: Never    Smokeless tobacco: Never   Vaping Use    Vaping status: Never Used   Substance and Sexual Activity    Alcohol use: No    Drug use: Never    Sexual activity: Yes     Partners: Male     Birth control/protection: Tubal Ligation   Other Topics Concern    Caffeine Concern No    Exercise Yes     Comment: walks for 20 minutes, 3 times per week.   Social History Narrative    Noreen is  x 18yrs. She has 3 children. 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 Noreen lives with her family in [place], [place].      Social Drivers of Health     Food Insecurity: No Food Insecurity (2/13/2025)    NCSS - Food Insecurity     Worried About Running Out of Food in the Last Year: No     Ran Out of Food in the Last Year: No   Transportation Needs: No Transportation Needs (2/13/2025)    NCSS - Transportation     Lack of Transportation: No   Housing Stability: Not At Risk (2/13/2025)    NCSS - Housing/Utilities     Has Housing: Yes     Worried About Losing Housing: No     Unable to Get Utilities: No              Review of Systems    Positive for stated complaint: toe numbness  Other systems are as noted in HPI.  Constitutional and vital signs reviewed.      All other systems reviewed and negative except as noted above.                  Physical Exam    ED Triage Vitals [06/22/25 1431]   /80   Pulse 76   Resp 18   Temp 98.1 °F (36.7 °C)   Temp src Oral   SpO2 97 %   O2 Device None (Room air)       Current Vitals:   Vital Signs  BP: 151/80  Pulse: 76  Resp: 18  Temp: 98.1 °F (36.7 °C)  Temp src: Oral    Oxygen Therapy  SpO2: 97 %  O2 Device: None (Room air)            Physical Exam  Vital signs reviewed. Nursing note reviewed.  Constitutional: Well-developed. Well-nourished. In no acute distress  HENT: Mucous membranes moist.   EYES: No scleral icterus or conjunctival  injection.  NECK: Full ROM. Supple.   CARDIAC: Normal rate.   PULM/CHEST:  No wheezes  Extremities: Full ROM of the right foot and toes.  Full flexion extension of right toe.  Cap refill 2+ bilaterally.  2+ dorsalis pedis pulse bilaterally.  No overlying edema ecchymosis erythema warmth or palpable tenderness.  NEURO: Awake, alert, following commands, moving extremities, answering questions.   SKIN: Warm and dry. No rash or lesions.  PSYCH: Normal judgment. Normal affect.                     MDM     Patient is a 49-year-old female who presents to immediate care due to right great toe anesthesia x 1 week.  Patient arrives with stable vitals.  Physical exam showing no obvious deformity, strong cap refill, full range of motion of the right great toe.  Consider x-rays however unlikely acute injury including fracture or dislocation without acute injury or fall.  Unlikely vascular occlusion with strong distal pulses.  Most likely neuropathy, encourage patient to follow-up with podiatry.  Return protocols including color changes, pain or any new or worsening symptoms.  History given by patient        Medical Decision Making      Disposition and Plan     Clinical Impression:  1. Anesthesia of skin         Disposition:  Discharge  6/22/2025  2:46 pm    Follow-up:  No follow-up provider specified.        Medications Prescribed:  Discharge Medication List as of 6/22/2025  2:49 PM                Supplementary Documentation:

## 2025-06-22 NOTE — ED INITIAL ASSESSMENT (HPI)
Numbness in R big toe x1 week. Being treated for bursitis in bilateral hips, reports tingling in both legs, currently attending PT for this. Denies pain, denies injury/trauma.

## 2025-07-01 ENCOUNTER — HOSPITAL ENCOUNTER (OUTPATIENT)
Dept: GENERAL RADIOLOGY | Facility: HOSPITAL | Age: 50
Discharge: HOME | End: 2025-07-01
Attending: PHYSICAL MEDICINE & REHABILITATION
Payer: COMMERCIAL

## 2025-07-01 ENCOUNTER — OFFICE VISIT (OUTPATIENT)
Dept: PHYSICAL MEDICINE AND REHAB | Facility: CLINIC | Age: 50
End: 2025-07-01
Payer: COMMERCIAL

## 2025-07-01 VITALS — BODY MASS INDEX: 36.06 KG/M2 | WEIGHT: 191 LBS | HEIGHT: 61 IN

## 2025-07-01 DIAGNOSIS — M67.959 TENDINOPATHY OF GLUTEUS MEDIUS: ICD-10-CM

## 2025-07-01 DIAGNOSIS — M54.16 BILATERAL LUMBAR RADICULOPATHY: Primary | ICD-10-CM

## 2025-07-01 DIAGNOSIS — M54.16 BILATERAL LUMBAR RADICULOPATHY: ICD-10-CM

## 2025-07-01 PROCEDURE — 72110 X-RAY EXAM L-2 SPINE 4/>VWS: CPT | Performed by: RADIOLOGY

## 2025-07-01 PROCEDURE — 99214 OFFICE O/P EST MOD 30 MIN: CPT | Performed by: PHYSICAL MEDICINE & REHABILITATION

## 2025-07-01 PROCEDURE — 3008F BODY MASS INDEX DOCD: CPT | Performed by: PHYSICAL MEDICINE & REHABILITATION

## 2025-07-01 NOTE — PROGRESS NOTES
[facility]  OFFICE FOLLOW UP EVALUATION      HISTORY OF PRESENT ILLNESS:     Chief Complaint   Patient presents with    Follow - Up     LOV: 6/3/2025 Patient follow up Ultrasound guided bilateral greater trochanteric bursa injection 90% for first week and currently 40% improvement presently.  C/o bilateral hip pain that radiates into legs with N/T in right 1st digit. Takes Gabapentin and Meloxicam.  Rates pain 6/10.       History of Present Illness  Vianey Cantu is a 50 year old female who presents with persistent lower extremity tingling and numbness.    She experiences tingling radiating down her legs and complete numbness in her right big toe, which developed after her second visit. The tingling occurs randomly, regardless of her position.    She is in the second week of physical therapy focused on her legs. Bursa injections provided temporary relief, but the pain persists.    She takes meloxicam daily, which reduces the pain but does not eliminate it. Gabapentin is taken at night for nerve pain, along with a muscle relaxer.    No weakness in her legs.        PHYSICAL EXAM:     Ht 61\"   Wt 191 lb (86.6 kg)   LMP 05/06/2025 (Approximate)   BMI 36.09 kg/m²     Gait  Able to toe walk and heel walk without any difficulty     HIP:  Inspection: no erythema, swelling, or obvious deformity  Palpation: Tenderness to palpation of the greater trochanter and gluteus medius bilaterally left worse than right and tenderness to palpation of the IT band  ROM: Restricted range of motion in internal and external rotation bilateral hips with bilateral hip tightness noted with DIAMOND testing  Strength: 5/5 in bilateral lower extremities  Sensation: Intact to light touch in all dermatomes of the lower extremities  DIAMOND: Positive for bilateral hip tightness and lateral hip pain  FADIR: negative for intra-articular hip pain  Nadege: Positive bilaterally  Trendelenberg: Positive for glut  med weakness on the lateral side    IMAGING:     None    All imaging results were reviewed and discussed with patient.      ASSESSMENT/PLAN:     1. Bilateral lumbar radiculopathy    2. Tendinopathy of gluteus medius        Assessment & Plan  Bilateral lumbar radiculopathy  Bursa injections provided temporary relief, but pain persists, less severe than initially.  New onset of numbness and tingling in the right leg and big toe. Persistent numbness in the right big toe. Differential diagnosis includes possible disc bulge affecting the spine. Previous MRI from 2021 was normal, but new MRI warranted to investigate current symptoms.  - Order MRI and x-ray of the lower back.  - Increase gabapentin to twice daily for nerve pain management.  - Continue physical therapy and home exercises  - Will discuss possible lumbar epidural steroid injection after imaging is completed      The patient verbalized understanding with the plan and was in agreement. All questions/concerns were addressed and there were no barriers to learning.  Please note Dragon dictation software was used to dictate this note and may result in inadvertent typos.    Suki Carson DO, FAAPMR & CAQSM  Physical Medicine and Rehabilitation  Sports and Spine Medicine    PAST MEDICAL HISTORY:   Past Medical History[1]      PAST SURGICAL HISTORY:   Past Surgical History[2]      CURRENT MEDICATIONS:   Current Medications[3]      ALLERGIES:   Allergies[4]      FAMILY HISTORY:   Family History[5]       SOCIAL HISTORY:   Short Social Hx on File[6]       REVIEW OF SYSTEMS:   A comprehensive 10 point review of systems was completed.  Pertinent positives and negatives noted in the the HPI.      LABS:     Lab Results   Component Value Date     (H) 06/27/2023    A1C 6.1 (H) 06/27/2023     Lab Results   Component Value Date    WBC 7.3 04/22/2025    RBC 5.12 04/22/2025    HGB 14.3 04/22/2025    HCT 42.5 04/22/2025    MCV 83.0 04/22/2025    MCH 27.9 04/22/2025    MCHC  33.6 2025    RDW 13.5 2025    .0 2025    MPV 7.9 2018     Lab Results   Component Value Date    GLU 99 2025    BUN 23 2025    BUNCREA 31.1 (H) 2025    CREATSERUM 0.74 2025    ANIONGAP 10 2025    GFR >60 2016    GFRNAA 101 2022    GFRAA 117 2022    CA 8.9 2025    OSMOCALC 298 (H) 2025    ALKPHO 68 2025    AST 18 2025    ALT 26 2025    ALKPHOS 65 2016    BILT 0.4 2025    TP 6.6 2025    ALB 4.6 2025    GLOBULIN 2.0 2025    AGRATIO 1.4 2016     2025    K 4.1 2025     2025    CO2 26.0 2025     Lab Results   Component Value Date    PTP 13.6 2022    INR 1.03 2022     Lab Results   Component Value Date    VITD 36.6 2024              [1]   Past Medical History:   Abdominal pain in female    LLQ. Colonoscopy : NL (10-14-15) except hemorrhoids.     Abnormal Pap smear of cervix    CHAYITO 1    Amenorrhea    Anemia    Anxiety    Cyst of buttocks    cyst on left buttocks, removed    Cyst of ovary, right    Decorative tattoo    Elevated liver enzymes    Fatty liver.     Esophageal reflux    S/P Lap. Nissen fundoplication.    Hiatal hernia    High blood pressure    High cholesterol    Incontinence    Umbilical hernia   [2]   Past Surgical History:  Procedure Laterality Date    Bravo 96hr - internal  2013    De[place]ter 30 1st 48, 16 2nd 48          x 3      2006    Cholecystectomy      Colonoscopy  2020    Colonoscopy      Colonoscopy      Colonoscopy N/A 3/11/2024    Procedure: COLONOSCOPY;  Surgeon: Tyrel Thurston MD;  Location: [facility] END[facility]    Egd  2013    Egd  2020    Egd      Esophageal manometry - internal  2013    normal    Excis primary ganglion wrist Left     wrist x2    Forearm/wrist surgery unlisted Left     wrist tendon surgery    Hemorrhoidectomy  2015    internal and anal skin  tag removeal. Benign.     Hernia surgery      Laparoscopic fundoplasty  2013    hiatal hernia     Daniel localization wire 1 site left (cpt=19281) Left 2017    sebaceous cyst    Other surgical history      Lasik    Other surgical history Left     ankle tendon repair    Tubal ligation     [3]   Current Outpatient Medications   Medication Sig Dispense Refill    MELOXICAM 15 MG Oral Tab TAKE 1 TABLET (15 MG TOTAL) BY MOUTH DAILY FOR 14 DAYS. 14 tablet 0    OMEPRAZOLE 40 MG Oral Capsule Delayed Release TAKE 1 CAPSULE (40 MG TOTAL) BY MOUTH EVERY MORNING BEFORE BREAKFAST. 30 capsule 0    gabapentin 300 MG Oral Cap Take 1 capsule (300 mg total) by mouth nightly. 30 capsule 5    ibuprofen 600 MG Oral Tab Take 1 tablet (600 mg total) by mouth every 8 (eight) hours as needed for Pain or Fever. 30 tablet 0    oxybutynin ER 10 MG Oral Tablet 24 Hr Take 1 tablet (10 mg total) by mouth daily. 90 tablet 3    metoprolol succinate ER 25 MG Oral Tablet 24 Hr Take 0.5 tablets (12.5 mg total) by mouth daily. 45 tablet 3    simvastatin 20 MG Oral Tab Take 1 tablet (20 mg total) by mouth nightly. 90 tablet 3    saccharomyces boulardii (FLORASTOR) 250 MG Oral Cap Take 1 capsule (250 mg total) by mouth 2 (two) times daily. (Patient not taking: Reported on 2025) 90 capsule 2   [4] No Known Allergies  [5]   Family History  Problem Relation Age of Onset    Cancer Mother 54        breast cancer     Breast Cancer Mother 54    Cancer Maternal Uncle         melanoma    Diabetes Maternal Grandmother     Stroke Maternal Grandfather     Lipids Father         hyperlipidemia    Diabetes Father         Dad  of liver cancer    Other (NSVT) Father     Cancer Father         Liver cancer    Bleeding Disorders Neg     Clotting Disorder Neg    [6]   Social History  Socioeconomic History    Marital status:    Occupational History    Occupation: Retired. Was dental ass't.    Tobacco Use    Smoking status: Never    Smokeless tobacco:  Never   Vaping Use    Vaping status: Never Used   Substance and Sexual Activity    Alcohol use: No    Drug use: Never    Sexual activity: Yes     Partners: Male     Birth control/protection: Tubal Ligation   Other Topics Concern    Caffeine Concern No    Exercise Yes     Comment: walks for 20 minutes, 3 times per week.   Social History Narrative    Noreen is  x 20yrs. She has 3 children. 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 Noreen lives with her family in [place], [place].      Social Drivers of Health     Food Insecurity: No Food Insecurity (2/13/2025)    NCSS - Food Insecurity     Worried About Running Out of Food in the Last Year: No     Ran Out of Food in the Last Year: No   Transportation Needs: No Transportation Needs (2/13/2025)    NCSS - Transportation     Lack of Transportation: No   Housing Stability: Not At Risk (2/13/2025)    NCSS - Housing/Utilities     Has Housing: Yes     Worried About Losing Housing: No     Unable to Get Utilities: No

## 2025-07-01 NOTE — PATIENT INSTRUCTIONS
-Increase Gabapentin 300mg twice daily  -Mobic daily for now  -Xray and MRI of the lumbar spine and follow up after

## 2025-07-03 ENCOUNTER — TELEPHONE (OUTPATIENT)
Dept: PHYSICAL MEDICINE AND REHAB | Facility: CLINIC | Age: 50
End: 2025-07-03

## 2025-07-15 ENCOUNTER — OFFICE VISIT (OUTPATIENT)
Dept: INTERNAL MEDICINE CLINIC | Facility: CLINIC | Age: 50
End: 2025-07-15
Payer: COMMERCIAL

## 2025-07-15 ENCOUNTER — LAB ENCOUNTER (OUTPATIENT)
Dept: LAB | Age: 50
End: 2025-07-15
Attending: NURSE PRACTITIONER
Payer: COMMERCIAL

## 2025-07-15 VITALS
WEIGHT: 190.63 LBS | HEART RATE: 53 BPM | DIASTOLIC BLOOD PRESSURE: 69 MMHG | SYSTOLIC BLOOD PRESSURE: 124 MMHG | BODY MASS INDEX: 35.99 KG/M2 | RESPIRATION RATE: 16 BRPM | OXYGEN SATURATION: 92 % | HEIGHT: 61 IN

## 2025-07-15 DIAGNOSIS — Z00.00 ANNUAL PHYSICAL EXAM: ICD-10-CM

## 2025-07-15 DIAGNOSIS — E55.9 VITAMIN D DEFICIENCY: ICD-10-CM

## 2025-07-15 DIAGNOSIS — E53.9 VITAMIN B DEFICIENCY: ICD-10-CM

## 2025-07-15 DIAGNOSIS — Z86.39 HISTORY OF IRON DEFICIENCY: ICD-10-CM

## 2025-07-15 DIAGNOSIS — Z13.1 SCREENING FOR DIABETES MELLITUS: ICD-10-CM

## 2025-07-15 DIAGNOSIS — Z00.00 ANNUAL PHYSICAL EXAM: Primary | ICD-10-CM

## 2025-07-15 PROBLEM — K81.9 CHOLECYSTITIS: Status: RESOLVED | Noted: 2020-12-18 | Resolved: 2025-07-15

## 2025-07-15 PROBLEM — M79.18 BUTTOCK PAIN: Status: RESOLVED | Noted: 2025-03-13 | Resolved: 2025-07-15

## 2025-07-15 PROBLEM — S83.242A ACUTE MEDIAL MENISCUS TEAR OF LEFT KNEE: Status: RESOLVED | Noted: 2020-05-29 | Resolved: 2025-07-15

## 2025-07-15 PROBLEM — R19.7 DIARRHEA: Status: RESOLVED | Noted: 2024-03-11 | Resolved: 2025-07-15

## 2025-07-15 PROBLEM — R93.7 BONE MARROW EDEMA: Status: RESOLVED | Noted: 2020-05-29 | Resolved: 2025-07-15

## 2025-07-15 PROBLEM — R10.9 ABDOMINAL PAIN: Status: RESOLVED | Noted: 2024-02-23 | Resolved: 2025-07-15

## 2025-07-15 PROBLEM — F41.9 ANXIETY: Status: RESOLVED | Noted: 2023-05-18 | Resolved: 2025-07-15

## 2025-07-15 LAB
ALBUMIN SERPL-MCNC: 4.8 G/DL (ref 3.2–4.8)
ALBUMIN/GLOB SERPL: 2.3 {RATIO} (ref 1–2)
ALP LIVER SERPL-CCNC: 65 U/L (ref 39–100)
ALT SERPL-CCNC: 25 U/L (ref 10–49)
ANION GAP SERPL CALC-SCNC: 7 MMOL/L (ref 0–18)
AST SERPL-CCNC: 18 U/L (ref ?–34)
BASOPHILS # BLD AUTO: 0.06 X10(3) UL (ref 0–0.2)
BASOPHILS NFR BLD AUTO: 1 %
BILIRUB SERPL-MCNC: 0.6 MG/DL (ref 0.3–1.2)
BUN BLD-MCNC: 18 MG/DL (ref 9–23)
BUN/CREAT SERPL: 23.1 (ref 10–20)
CALCIUM BLD-MCNC: 9.6 MG/DL (ref 8.7–10.4)
CHLORIDE SERPL-SCNC: 104 MMOL/L (ref 98–112)
CHOLEST SERPL-MCNC: 143 MG/DL (ref ?–200)
CO2 SERPL-SCNC: 27 MMOL/L (ref 21–32)
CREAT BLD-MCNC: 0.78 MG/DL (ref 0.55–1.02)
DEPRECATED HBV CORE AB SER IA-ACNC: 134 NG/ML (ref 50–306)
DEPRECATED RDW RBC AUTO: 43.3 FL (ref 35.1–46.3)
DEPRECATED RDW RBC AUTO: 43.3 FL (ref 35.1–46.3)
EGFRCR SERPLBLD CKD-EPI 2021: 94 ML/MIN/1.73M2 (ref 60–?)
EOSINOPHIL # BLD AUTO: 0.02 X10(3) UL (ref 0–0.7)
EOSINOPHIL NFR BLD AUTO: 0.3 %
ERYTHROCYTE [DISTWIDTH] IN BLOOD BY AUTOMATED COUNT: 13.6 % (ref 11–15)
ERYTHROCYTE [DISTWIDTH] IN BLOOD BY AUTOMATED COUNT: 13.6 % (ref 11–15)
EST. AVERAGE GLUCOSE BLD GHB EST-MCNC: 120 MG/DL (ref 68–126)
FASTING PATIENT LIPID ANSWER: YES
FASTING STATUS PATIENT QL REPORTED: YES
GLOBULIN PLAS-MCNC: 2.1 G/DL (ref 2–3.5)
GLUCOSE BLD-MCNC: 98 MG/DL (ref 70–99)
HBA1C MFR BLD: 5.8 % (ref ?–5.7)
HCT VFR BLD AUTO: 43.6 % (ref 35–48)
HCT VFR BLD AUTO: 43.6 % (ref 35–48)
HDLC SERPL-MCNC: 42 MG/DL (ref 40–59)
HGB BLD-MCNC: 14.1 G/DL (ref 12–16)
HGB BLD-MCNC: 14.1 G/DL (ref 12–16)
IMM GRANULOCYTES # BLD AUTO: 0.09 X10(3) UL (ref 0–1)
IMM GRANULOCYTES NFR BLD: 1.4 %
IRON SATN MFR SERPL: 26 % (ref 15–50)
IRON SERPL-MCNC: 80 UG/DL (ref 50–170)
LDLC SERPL CALC-MCNC: 76 MG/DL (ref ?–100)
LYMPHOCYTES # BLD AUTO: 1.41 X10(3) UL (ref 1–4)
LYMPHOCYTES NFR BLD AUTO: 22.3 %
MCH RBC QN AUTO: 28.3 PG (ref 26–34)
MCH RBC QN AUTO: 28.3 PG (ref 26–34)
MCHC RBC AUTO-ENTMCNC: 32.3 G/DL (ref 31–37)
MCHC RBC AUTO-ENTMCNC: 32.3 G/DL (ref 31–37)
MCV RBC AUTO: 87.4 FL (ref 80–100)
MCV RBC AUTO: 87.4 FL (ref 80–100)
MONOCYTES # BLD AUTO: 0.46 X10(3) UL (ref 0.1–1)
MONOCYTES NFR BLD AUTO: 7.3 %
NEUTROPHILS # BLD AUTO: 4.27 X10 (3) UL (ref 1.5–7.7)
NEUTROPHILS # BLD AUTO: 4.27 X10(3) UL (ref 1.5–7.7)
NEUTROPHILS NFR BLD AUTO: 67.7 %
NONHDLC SERPL-MCNC: 101 MG/DL (ref ?–130)
OSMOLALITY SERPL CALC.SUM OF ELEC: 288 MOSM/KG (ref 275–295)
PLATELET # BLD AUTO: 274 10(3)UL (ref 150–450)
PLATELET # BLD AUTO: 274 10(3)UL (ref 150–450)
POTASSIUM SERPL-SCNC: 4.5 MMOL/L (ref 3.5–5.1)
PROT SERPL-MCNC: 6.9 G/DL (ref 5.7–8.2)
RBC # BLD AUTO: 4.99 X10(6)UL (ref 3.8–5.3)
RBC # BLD AUTO: 4.99 X10(6)UL (ref 3.8–5.3)
SODIUM SERPL-SCNC: 138 MMOL/L (ref 136–145)
TOTAL IRON BINDING CAPACITY: 304 UG/DL (ref 250–425)
TRANSFERRIN SERPL-MCNC: 234 MG/DL (ref 250–380)
TRIGL SERPL-MCNC: 144 MG/DL (ref 30–149)
TSI SER-ACNC: 1.63 UIU/ML (ref 0.55–4.78)
VIT B12 SERPL-MCNC: 482 PG/ML (ref 211–911)
VIT D+METAB SERPL-MCNC: 38.9 NG/ML (ref 30–100)
VLDLC SERPL CALC-MCNC: 22 MG/DL (ref 0–30)
WBC # BLD AUTO: 6.3 X10(3) UL (ref 4–11)
WBC # BLD AUTO: 6.3 X10(3) UL (ref 4–11)

## 2025-07-15 PROCEDURE — 3078F DIAST BP <80 MM HG: CPT | Performed by: NURSE PRACTITIONER

## 2025-07-15 PROCEDURE — 83540 ASSAY OF IRON: CPT

## 2025-07-15 PROCEDURE — 85027 COMPLETE CBC AUTOMATED: CPT

## 2025-07-15 PROCEDURE — 36415 COLL VENOUS BLD VENIPUNCTURE: CPT

## 2025-07-15 PROCEDURE — 3008F BODY MASS INDEX DOCD: CPT | Performed by: NURSE PRACTITIONER

## 2025-07-15 PROCEDURE — 80061 LIPID PANEL: CPT

## 2025-07-15 PROCEDURE — 85025 COMPLETE CBC W/AUTO DIFF WBC: CPT

## 2025-07-15 PROCEDURE — 82728 ASSAY OF FERRITIN: CPT

## 2025-07-15 PROCEDURE — 82306 VITAMIN D 25 HYDROXY: CPT

## 2025-07-15 PROCEDURE — 3074F SYST BP LT 130 MM HG: CPT | Performed by: NURSE PRACTITIONER

## 2025-07-15 PROCEDURE — 84466 ASSAY OF TRANSFERRIN: CPT

## 2025-07-15 PROCEDURE — 84443 ASSAY THYROID STIM HORMONE: CPT

## 2025-07-15 PROCEDURE — 80053 COMPREHEN METABOLIC PANEL: CPT

## 2025-07-15 PROCEDURE — 83036 HEMOGLOBIN GLYCOSYLATED A1C: CPT

## 2025-07-15 PROCEDURE — 99396 PREV VISIT EST AGE 40-64: CPT | Performed by: NURSE PRACTITIONER

## 2025-07-15 PROCEDURE — 82607 VITAMIN B-12: CPT

## 2025-07-15 RX ORDER — OMEPRAZOLE 40 MG/1
40 CAPSULE, DELAYED RELEASE ORAL
Qty: 30 CAPSULE | Refills: 0 | Status: SHIPPED | OUTPATIENT
Start: 2025-07-15

## 2025-07-15 RX ORDER — SIMVASTATIN 40 MG
40 TABLET ORAL NIGHTLY
Qty: 90 TABLET | Refills: 0 | Status: SHIPPED | OUTPATIENT
Start: 2025-07-15

## 2025-07-15 NOTE — PROGRESS NOTES
HPI:        The following individual(s) verbally consented to be recorded using ambient AI listening technology and understand that they can each withdraw their consent to this listening technology at any point by asking the clinician to turn off or pause the recording:    Patient name: Vianey Cantu          Patient ID: Vianey Cantu is a 50 year old female.    HPI    History of Present Illness  Vianey Cantu is a 50 year old female who presents for a full physical exam and follow-up on leg pain and numbness.    She experiences ongoing pain in her legs, accompanied by numbness and tingling. The pain starts from the upper legs and extends downwards, affecting both legs. No back pain is reported. She confirms numbness and tingling in her legs, with no associated leg swelling or joint pain.    She has a history of a hiatal hernia and is currently experiencing heartburn. She takes omeprazole daily, but it does not last all day, leading to recurring symptoms. She has previously seen a specialist for this condition.    She is on simvastatin 20 mg nightly for cholesterol management. A recent calcium test indicated an increase in percentage. She reports compliance with dietary changes, including reducing red meat intake to once a week and increasing vegetable consumption.    She has a history of iron deficiency anemia and received iron infusions last year. She continues to follow up with a specialist for this condition.    No current abdominal pain, rectal itching, or buttocks pain, which were issues in the past. She denies any symptoms of anxiety, depression, or significant fatigue. In the review of symptoms, she denies chills, fever, ear pain, hearing loss, sinus pain, sore throat, trouble swallowing, voice change, eye pain, cough, shortness of breath, chest pain, palpitations, abdominal pain, constipation, diarrhea, nausea, vomiting, heat or cold intolerance, pain on urination, unusual vaginal discharge, back pain, unusual  rash, environmental allergies, headaches, easy bruising or bleeding, and nervousness or anxiety.    She worksas a dental assistant, and reports no use of tobacco, alcohol, or recreational drugs. She has three children, one of whom is a patient at the same practice.       Immunization History   Administered Date(s) Administered    Covid-19 Vaccine Pfizer 30 mcg/0.3 ml 2021, 2021, 2021    FLU VAC QIV SPLIT 3 YRS AND OLDER (95257) 2017    FLULAVAL 6 months & older 0.5 ml Prefilled syringe (93631) 2017, 2019, 10/20/2020, 10/28/2021    FLUZONE 6 months and older PFS 0.5 ml (64118) 2017    Fluvirin, 3 Years & >, Im 10/15/2010    Influenza 10/03/2013    TDAP 2019       Past Medical History:    Abdominal pain in female    LLQ. Colonoscopy : NL (10-14-15) except hemorrhoids.     Abnormal Pap smear of cervix    CHAYITO 1    Amenorrhea    Anemia    Anxiety    Cyst of buttocks    cyst on left buttocks, removed    Cyst of ovary, right    Decorative tattoo    Elevated liver enzymes    Fatty liver.     Esophageal reflux    S/P Lap. Nissen fundoplication.    Hiatal hernia    High blood pressure    High cholesterol    Incontinence    Umbilical hernia      Past Surgical History:   Procedure Laterality Date    Bravo 96hr - internal  2013    De[place]eester 30 1st 48, 16 2nd 48          x 3      2006    Cholecystectomy      Colonoscopy  2020    Colonoscopy      Colonoscopy      Colonoscopy N/A 3/11/2024    Procedure: COLONOSCOPY;  Surgeon: Tyrel Thurston MD;  Location: [facility] END[facility]    Egd  2013    Egd  2020    Egd      Esophageal manometry - internal  2013    normal    Excis primary ganglion wrist Left     wrist x2    Forearm/wrist surgery unlisted Left     wrist tendon surgery    Hemorrhoidectomy  2015    internal and anal skin tag removeal. Benign.     Hernia surgery      Laparoscopic fundoplasty  2013    hiatal hernia     Daniel localization  wire 1 site left (cpt=19281) Left 07/2017    sebaceous cyst    Other surgical history      Lasik    Other surgical history Left     ankle tendon repair    Tubal ligation  2014      Social History     Socioeconomic History    Marital status:    Occupational History    Occupation: Retired. Was dental ass't.    Tobacco Use    Smoking status: Never    Smokeless tobacco: Never   Vaping Use    Vaping status: Never Used   Substance and Sexual Activity    Alcohol use: No    Drug use: Never    Sexual activity: Yes     Partners: Male     Birth control/protection: Tubal Ligation   Other Topics Concern    Caffeine Concern No    Exercise Yes     Comment: walks for 20 minutes, 3 times per week.          Review of Systems   Constitutional:  Negative for chills, fatigue and fever.   HENT:  Negative for ear pain, hearing loss, sinus pressure, sinus pain, sore throat, trouble swallowing and voice change.    Eyes:  Negative for pain and visual disturbance.   Respiratory:  Negative for cough, chest tightness and shortness of breath.    Cardiovascular:  Negative for chest pain, palpitations and leg swelling.   Gastrointestinal:  Negative for abdominal pain, constipation, diarrhea, nausea and vomiting.   Endocrine: Negative for cold intolerance and heat intolerance.   Genitourinary:  Negative for dysuria, hematuria and vaginal discharge.   Musculoskeletal:  Positive for back pain. Negative for joint swelling.   Skin:  Negative for rash.   Allergic/Immunologic: Negative for environmental allergies.   Neurological:  Positive for numbness. Negative for weakness and headaches.   Hematological:  Does not bruise/bleed easily.   Psychiatric/Behavioral:  Negative for dysphoric mood and sleep disturbance. The patient is not nervous/anxious.               Current Outpatient Medications   Medication Sig Dispense Refill    OMEPRAZOLE 40 MG Oral Capsule Delayed Release TAKE 1 CAPSULE (40 MG TOTAL) BY MOUTH EVERY MORNING BEFORE BREAKFAST. 30  capsule 0    gabapentin 300 MG Oral Cap Take 1 capsule (300 mg total) by mouth nightly. 30 capsule 5    ibuprofen 600 MG Oral Tab Take 1 tablet (600 mg total) by mouth every 8 (eight) hours as needed for Pain or Fever. 30 tablet 0    oxybutynin ER 10 MG Oral Tablet 24 Hr Take 1 tablet (10 mg total) by mouth daily. 90 tablet 3    metoprolol succinate ER 25 MG Oral Tablet 24 Hr Take 0.5 tablets (12.5 mg total) by mouth daily. 45 tablet 3    simvastatin 20 MG Oral Tab Take 1 tablet (20 mg total) by mouth nightly. 90 tablet 3    saccharomyces boulardii (FLORASTOR) 250 MG Oral Cap Take 1 capsule (250 mg total) by mouth 2 (two) times daily. (Patient not taking: Reported on 7/1/2025) 90 capsule 2     Allergies:No Known Allergies   PHYSICAL EXAM:   Physical Exam  Constitutional:       Appearance: Normal appearance. She is well-developed.   HENT:      Head: Normocephalic.      Right Ear: Tympanic membrane normal.      Left Ear: Tympanic membrane normal.      Nose: Nose normal.      Mouth/Throat:      Mouth: Mucous membranes are moist.      Pharynx: No oropharyngeal exudate or posterior oropharyngeal erythema.   Eyes:      General:         Right eye: No discharge.         Left eye: No discharge.      Pupils: Pupils are equal, round, and reactive to light.   Cardiovascular:      Rate and Rhythm: Normal rate and regular rhythm.      Heart sounds: Normal heart sounds. No murmur heard.     No friction rub. No gallop.   Pulmonary:      Effort: Pulmonary effort is normal. No respiratory distress.      Breath sounds: Normal breath sounds. No wheezing, rhonchi or rales.   Abdominal:      General: Bowel sounds are normal. There is no distension.      Palpations: Abdomen is soft. There is no mass.      Tenderness: There is no abdominal tenderness. There is no right CVA tenderness, left CVA tenderness or guarding.   Musculoskeletal:         General: No tenderness.      Cervical back: Normal range of motion and neck supple. No  tenderness.      Right lower leg: No edema.      Left lower leg: No edema.   Lymphadenopathy:      Cervical: No cervical adenopathy.   Skin:     General: Skin is warm and dry.      Findings: No rash.   Neurological:      Mental Status: She is alert and oriented to person, place, and time.      Coordination: Coordination normal.      Gait: Gait normal.   Psychiatric:         Mood and Affect: Mood normal.         Behavior: Behavior normal.         Thought Content: Thought content normal.         Judgment: Judgment normal.       LMP 05/06/2025 (Approximate)   Wt Readings from Last 2 Encounters:   07/01/25 191 lb (86.6 kg)   06/20/25 191 lb (86.6 kg)     There is no height or weight on file to calculate BMI.(2)  Lab Results   Component Value Date    WBC 7.3 04/22/2025    RBC 5.12 04/22/2025    HGB 14.3 04/22/2025    HCT 42.5 04/22/2025    MCV 83.0 04/22/2025    MCH 27.9 04/22/2025    MCHC 33.6 04/22/2025    RDW 13.5 04/22/2025    .0 04/22/2025    MPV 7.9 09/22/2018      Lab Results   Component Value Date    GLU 99 01/31/2025    BUN 23 01/31/2025    BUNCREA 31.1 (H) 01/31/2025    CREATSERUM 0.74 01/31/2025    ANIONGAP 10 01/31/2025    GFR >60 02/17/2016    GFRNAA 101 07/23/2022    GFRAA 117 07/23/2022    CA 8.9 01/31/2025    OSMOCALC 298 (H) 01/31/2025    ALKPHO 68 01/31/2025    AST 18 01/31/2025    ALT 26 01/31/2025    ALKPHOS 65 06/16/2016    BILT 0.4 01/31/2025    TP 6.6 01/31/2025    ALB 4.6 01/31/2025    GLOBULIN 2.0 01/31/2025    AGRATIO 1.4 06/16/2016     01/31/2025    K 4.1 01/31/2025     01/31/2025    CO2 26.0 01/31/2025      Lab Results   Component Value Date     (H) 06/27/2023    A1C 6.1 (H) 06/27/2023      Lab Results   Component Value Date    CHOLEST 160 06/20/2025    TRIG 133 06/20/2025    HDL 44 06/20/2025    LDL 89 06/20/2025    VLDL 12 07/11/2024    NONHDLC 98 07/11/2024    CALCNONHDL 134 (H) 07/01/2015      Lab Results   Component Value Date    T4F 0.80 02/15/2013    TSH 0.702  07/11/2024                ASSESSMENT/PLAN:     Assessment & Plan  Annual physical exam  Normal exam.  Labs as ordered.  Skin check normal.  No significant abnormal nevi.  Breast exam completed-no palpable abnormalities, discharge from the nipples or axillary adenopathy.  No cervical or inguinal lymphadenopathy.  Hernial orifices intact.    Immunizations- Up to date  Orders:    Comp Metabolic Panel (14); Future    Lipid Panel; Future    Vitamin D, 25-Hydroxy; Future    Vitamin B12; Future    TSH W Reflex To Free T4; Future    CBC, NO DIFFERENTIAL/PLATELET; Future    Hemoglobin A1C [E]; Future    Vitamin D deficiency    Orders:    Vitamin D, 25-Hydroxy; Future    Vitamin B deficiency    Orders:    Vitamin B12; Future    Screening for diabetes mellitus    Orders:    Hemoglobin A1C [E]; Future      No orders of the defined types were placed in this encounter.      Assessment & Plan  Bilateral sciatic nerve pain  Ongoing pain, numbness, and tingling in both legs, consistent with sciatic nerve pain.  - Order MRI of the spine to evaluate sciatic nerve pain.    Hiatal hernia with gastroesophageal reflux disease (GERD)  Experiences heartburn and omeprazole does not provide full-day relief. Advised follow-up with Dr. Thurston for further management.  - Renew omeprazole prescription.  - Advise follow-up with Dr. Thurston for hiatal hernia and GERD management.    Hyperlipidemia  Calcium score increased, indicating mild coronary artery disease risk. Currently on simvastatin 20 mg nightly, tolerates well. Decision to increase dosage to 40 mg to prevent further plaque buildup.  - Increase simvastatin to 40 mg nightly.  - Order lipid panel to assess cholesterol levels.  - Advise dietary modifications to reduce red meat intake and increase vegetable consumption.    Anemia  Receiving iron infusions and following up with Dr. Izaguirre. No new symptoms reported.  - Continue follow-up with Dr. Izaguirre for anemia management.    General Health  Maintenance  Due for mammogram and following up with gynecologist for Pap smear. Discussed importance of self-breast exams and helmet use while biking.  - Schedule and complete mammogram.  - Encourage regular self-breast exams.  - Order annual labs including thyroid, vitamin B12, vitamin D, and hemoglobin A1c.  - Advise wearing a helmet while biking to prevent head injuries.       Meds This Visit:  Requested Prescriptions      No prescriptions requested or ordered in this encounter       Imaging & Referrals:  None         JERZY Galvan

## 2025-07-15 NOTE — ASSESSMENT & PLAN NOTE
Normal exam.  Labs as ordered.  Skin check normal.  No significant abnormal nevi.  Breast exam completed-no palpable abnormalities, discharge from the nipples or axillary adenopathy.  No cervical or inguinal lymphadenopathy.  Hernial orifices intact.    Immunizations- Up to date  Orders:    Comp Metabolic Panel (14); Future    Lipid Panel; Future    Vitamin D, 25-Hydroxy; Future    Vitamin B12; Future    TSH W Reflex To Free T4; Future    CBC, NO DIFFERENTIAL/PLATELET; Future    Hemoglobin A1C [E]; Future

## 2025-07-18 ENCOUNTER — MED REC SCAN ONLY (OUTPATIENT)
Dept: PHYSICAL MEDICINE AND REHAB | Facility: CLINIC | Age: 50
End: 2025-07-18

## 2025-07-22 ENCOUNTER — HOSPITAL ENCOUNTER (OUTPATIENT)
Dept: MRI IMAGING | Facility: HOSPITAL | Age: 50
Discharge: HOME | End: 2025-07-22
Attending: PHYSICAL MEDICINE & REHABILITATION
Payer: COMMERCIAL

## 2025-07-22 DIAGNOSIS — M54.16 BILATERAL LUMBAR RADICULOPATHY: ICD-10-CM

## 2025-07-22 PROCEDURE — 72148 MRI LUMBAR SPINE W/O DYE: CPT | Performed by: PHYSICAL MEDICINE & REHABILITATION

## 2025-07-25 ENCOUNTER — TELEMEDICINE (OUTPATIENT)
Dept: INTERNAL MEDICINE CLINIC | Facility: CLINIC | Age: 50
End: 2025-07-25
Payer: COMMERCIAL

## 2025-07-25 ENCOUNTER — TELEPHONE (OUTPATIENT)
Dept: INTERNAL MEDICINE CLINIC | Facility: CLINIC | Age: 50
End: 2025-07-25

## 2025-07-25 ENCOUNTER — NURSE TRIAGE (OUTPATIENT)
Dept: INTERNAL MEDICINE CLINIC | Facility: CLINIC | Age: 50
End: 2025-07-25

## 2025-07-25 DIAGNOSIS — M79.604 LEG PAIN, BILATERAL: Primary | ICD-10-CM

## 2025-07-25 DIAGNOSIS — M79.605 LEG PAIN, BILATERAL: Primary | ICD-10-CM

## 2025-07-25 PROCEDURE — 98005 SYNCH AUDIO-VIDEO EST LOW 20: CPT | Performed by: NURSE PRACTITIONER

## 2025-07-25 NOTE — PROGRESS NOTES
Virtual Visit Check-In    VIANEY CANTU or legal guardian   verbally consents to a Virtual Visit Check-In service on 7/25/2025    Patient understands and accepts financial responsibility for any deductible, co-insurance and/or co-pays associated with this service.    Duration of the service:   Call duration 12 minutes   Video visit     Chief Complaint   Patient presents with    Leg Pain       HPI:    Patient ID: Vianey Cantu is a 50 year old female. She presents with bilateral leg pain. She has numbness and tingling that is worsening. She is currently taking meloxicam daily with some relief. She is taking gabapentin at night. She is only 300mg daily with tylenol. She was told to increase her dose of gabapentin to 600mg but she is not able to tolerate the increase due to having drowsiness. She feels the gabapentin is not helping her symptoms.     She has been prescribed a medrol dose pack in the past with little relief. She also had a steroid injection on 6/3 with little relief.     She is following with physiatry. 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   2.  Mild localized degenerative changes at L4 S1. No significant resultant neural compromise at this level or elsewhere throughout the lumbar spine.   She is scheduled to follow up with Dr Carson on 7/29/2025. She wondering what she can take for the pain until her appointment.       ROS    Review of Systems   Constitutional:  Negative for fever.   HENT: Negative.     Respiratory:  Negative for cough, shortness of breath and wheezing.    Cardiovascular:  Negative for chest pain.   Gastrointestinal: Negative.    Genitourinary: Negative.    Musculoskeletal:  Positive for arthralgias (bilateral legs).   Skin: Negative.    Neurological:  Positive for numbness (bilateral legs). Negative for dizziness, weakness and headaches.    Psychiatric/Behavioral: Negative.             Current Medications[1]    Allergies:Allergies[2]     Medications - Current[3]    Past Medical History[4]      PHYSICAL EXAM:     Vitals signs taken at home if available:    Limited examination for this telephone visit due to the coronavirus emergency    Patient was speaking in complete sentences, no increased work of breathing and very coherent and alert on the phone.  Alert and oriented x 3  Patient was responding to questions appropriately.  Patient did not appear short of breath.    ASSESSMENT/PLAN:     Encounter Diagnosis   Name Primary?    Leg pain, bilateral Yes       1. Leg pain, bilateral  - Discussed with patient to continue meloxicam 15 mg daily.  Offered her a Medrol Dosepak.  She states she was prescribed this previously and it did not help her symptoms.  - Patient is not able to tolerate an increase in gabapentin due to drowsiness.  She will continue 300 mg nightly.  - Discussed with patient to add a topical treatments such as Biofreeze or lidocaine to help with her symptoms.  She can also take Tylenol along with meloxicam as needed throughout the day.  - I do not feel that adding a narcotic at this time would be appropriate.  Discussed with patient that the side effect of the narcotic would be drowsiness.  - Follow-up with Dr. Carson as scheduled for further recommendations      Patient reminded to practice good health and safety measures including washing hands, social distancing, covering mouth when coughing/ sneezing, avoid social meetings/ gatherings in face of this Covid 19 pandemic.    Patient verbalized understanding of plan and all questions answered to the best of my ability.    Patient to call back if any change/ worsening of symptoms.    No orders of the defined types were placed in this encounter.      Meds This Visit:  Requested Prescriptions      No prescriptions requested or ordered in this encounter       Imaging &  Referrals:  None               Cindy Schmid, APRN  7/25/2025  1:09 PM      Please note that the following visit was completed using two-way, real-time interactive audio and/or video communication.  This has been done in good van to provide continuity of care in the best interest of the provider-patient relationship, due to the ongoing public health crisis/national emergency and because of restrictions of visitation.  There are limitations of this visit as no physical exam could be performed.  Every conscious effort was taken to allow for sufficient and adequate time.  This billing was spent on reviewing labs, medications, radiology tests and decision making.  Appropriate medical decision-making and tests are ordered as detailed in the plan of care above  ID#[id number]       [1]   Current Outpatient Medications   Medication Sig Dispense Refill    Omeprazole 40 MG Oral Capsule Delayed Release Take 1 capsule (40 mg total) by mouth before breakfast. 30 capsule 0    simvastatin 40 MG Oral Tab Take 1 tablet (40 mg total) by mouth nightly. 90 tablet 0    gabapentin 300 MG Oral Cap Take 1 capsule (300 mg total) by mouth nightly. 30 capsule 5    ibuprofen 600 MG Oral Tab Take 1 tablet (600 mg total) by mouth every 8 (eight) hours as needed for Pain or Fever. 30 tablet 0    oxybutynin ER 10 MG Oral Tablet 24 Hr Take 1 tablet (10 mg total) by mouth daily. 90 tablet 3    metoprolol succinate ER 25 MG Oral Tablet 24 Hr Take 0.5 tablets (12.5 mg total) by mouth daily. 45 tablet 3    saccharomyces boulardii (FLORASTOR) 250 MG Oral Cap Take 1 capsule (250 mg total) by mouth 2 (two) times daily. (Patient not taking: Reported on 7/1/2025) 90 capsule 2   [2] No Known Allergies  [3]   Current Outpatient Medications:     Omeprazole 40 MG Oral Capsule Delayed Release, Take 1 capsule (40 mg total) by mouth before breakfast., Disp: 30 capsule, Rfl: 0    simvastatin 40 MG Oral Tab, Take 1 tablet (40 mg total) by mouth nightly., Disp: 90  tablet, Rfl: 0    gabapentin 300 MG Oral Cap, Take 1 capsule (300 mg total) by mouth nightly., Disp: 30 capsule, Rfl: 5    ibuprofen 600 MG Oral Tab, Take 1 tablet (600 mg total) by mouth every 8 (eight) hours as needed for Pain or Fever., Disp: 30 tablet, Rfl: 0    oxybutynin ER 10 MG Oral Tablet 24 Hr, Take 1 tablet (10 mg total) by mouth daily., Disp: 90 tablet, Rfl: 3    metoprolol succinate ER 25 MG Oral Tablet 24 Hr, Take 0.5 tablets (12.5 mg total) by mouth daily., Disp: 45 tablet, Rfl: 3    saccharomyces boulardii (FLORASTOR) 250 MG Oral Cap, Take 1 capsule (250 mg total) by mouth 2 (two) times daily. (Patient not taking: Reported on 7/1/2025), Disp: 90 capsule, Rfl: 2  [4]   Past Medical History:   Abdominal pain    Abdominal pain in female    LLQ. Colonoscopy : NL (10-14-15) except hemorrhoids.     Abnormal Pap smear of cervix    CHAYITO 1    Acute medial meniscus tear of left knee    5-29-20: PROCEDURE:  Left knee arthroscopy with:  1.       Partial medial meniscectomy.  2.       Medial tibial subchondroplasty.  3.       Fluoroscopy.  4.       Medial synovectomy.      Amenorrhea    Anal pruritus    Anemia    Anxiety    Bleeding internal hemorrhoids    Bone marrow edema    Left medial tibial plateau      Buttock pain    Cholecystitis    S/P lap. Laura. 12-18-20 DR. Vargas.       Cyst of buttocks    cyst on left buttocks, removed    Cyst of ovary, right    Decorative tattoo    Diarrhea    Elevated liver enzymes    Fatty liver.     Esophageal reflux    S/P Lap. Nissen fundoplication.    Hiatal hernia    High blood pressure    High cholesterol    Incontinence    Umbilical hernia

## 2025-07-25 NOTE — TELEPHONE ENCOUNTER
Patient contacts clinic reporting pain in her legs with numbness and tingling.  Follow with physiatry for lumbar radiculopathy.  Taking gabapentin and ibuprofen without relief.  Nurse advised notifying specialist for further recommendations.  Call transferred to [facility].

## 2025-07-25 NOTE — PATIENT INSTRUCTIONS
Okay to continue meloxicam 15 mg daily.   Okay to continue gabapentin 300 mg nightly.    You can add a topical treatment for pain such as lidocaine or Biofreeze.    You can also take Tylenol throughout the day.  You can take 1000 mg every 8 hours as needed for pain.  If your pain becomes severe go to the emergency room.

## 2025-07-25 NOTE — TELEPHONE ENCOUNTER
Action Requested: Summary for Provider     []  Critical Lab, Recommendations Needed  [] Need Additional Advice  []   FYI    []   Need Orders  [] Need Medications Sent to Pharmacy  [x]  Other     SUMMARY: Patient calling, reports pain level of 8 for the last 3 weeks or so. Tingling and numbness in thighs along with pain noted. Seeing physiatry. She called their office today and schedule for visit next Tuesday to review MRI. Asked if other pain meds or options for pain control until next visit and was told would need to wait for visit or go to ER. Patient taking Gabapentin as prescribed by Maye Shell and tylenol. Not helping at all and would like to have someone provide some meds to get her to visit next week.     No bowel or bladder concerns. Able to go and no loss of control. No back pain noted \"all in the thighs\" and one great toe on one foot is numb. Able to walk and get up and around ok with some discomfort. Does not want to go to ER, feels that is not needed.     Scheduled for visit today with provider for video, only visit available and she is agreeable     Future Appointments   Date Time Provider Department Center   7/25/2025  2:40 PM Cindy Schmid APRN [facility]   7/29/2025  7:10 AM Suki Carson, DO PM&R [facility]   8/8/2025  3:00 PM Lilia Jimenez APRN [facility]   8/30/2025  7:20 AM [facility] RM[facility] EM [facility]         Reason for call: Leg Pain  Onset: ongoing       Reason for Disposition   SEVERE pain (e.g., excruciating, unable to do any normal activities)    Protocols used: Leg Pain-A-[place]

## 2025-07-29 ENCOUNTER — TELEMEDICINE (OUTPATIENT)
Dept: PHYSICAL MEDICINE AND REHAB | Facility: CLINIC | Age: 50
End: 2025-07-29
Payer: COMMERCIAL

## 2025-07-29 DIAGNOSIS — M54.16 BILATERAL LUMBAR RADICULOPATHY: Primary | ICD-10-CM

## 2025-07-29 PROCEDURE — 98006 SYNCH AUDIO-VIDEO EST MOD 30: CPT | Performed by: PHYSICAL MEDICINE & REHABILITATION

## 2025-07-29 RX ORDER — PREGABALIN 75 MG/1
75 CAPSULE ORAL 2 TIMES DAILY
Qty: 60 CAPSULE | Refills: 0 | Status: SHIPPED | OUTPATIENT
Start: 2025-07-29

## 2025-08-08 ENCOUNTER — OFFICE VISIT (OUTPATIENT)
Facility: LOCATION | Age: 50
End: 2025-08-08
Attending: NURSE PRACTITIONER

## 2025-08-08 VITALS
DIASTOLIC BLOOD PRESSURE: 75 MMHG | WEIGHT: 190.38 LBS | OXYGEN SATURATION: 96 % | SYSTOLIC BLOOD PRESSURE: 136 MMHG | HEIGHT: 61 IN | RESPIRATION RATE: 18 BRPM | BODY MASS INDEX: 35.94 KG/M2 | HEART RATE: 66 BPM | TEMPERATURE: 99 F

## 2025-08-08 DIAGNOSIS — D50.0 IRON DEFICIENCY ANEMIA DUE TO CHRONIC BLOOD LOSS: Primary | ICD-10-CM

## 2025-08-25 DIAGNOSIS — R35.1 NOCTURIA: ICD-10-CM

## 2025-08-25 DIAGNOSIS — N39.41 URGE INCONTINENCE: ICD-10-CM

## 2025-08-25 DIAGNOSIS — N32.81 DETRUSOR INSTABILITY: ICD-10-CM

## 2025-08-26 ENCOUNTER — PATIENT MESSAGE (OUTPATIENT)
Facility: CLINIC | Age: 50
End: 2025-08-26

## 2025-08-26 RX ORDER — OMEPRAZOLE 40 MG/1
40 CAPSULE, DELAYED RELEASE ORAL
Qty: 30 CAPSULE | Refills: 0 | Status: SHIPPED | OUTPATIENT
Start: 2025-08-26

## 2025-08-28 ENCOUNTER — HOSPITAL ENCOUNTER (OUTPATIENT)
Dept: GENERAL RADIOLOGY | Facility: HOSPITAL | Age: 50
Discharge: HOME | End: 2025-08-28

## 2025-08-28 ENCOUNTER — OFFICE VISIT (OUTPATIENT)
Facility: CLINIC | Age: 50
End: 2025-08-28

## 2025-08-28 VITALS
DIASTOLIC BLOOD PRESSURE: 73 MMHG | BODY MASS INDEX: 35.3 KG/M2 | SYSTOLIC BLOOD PRESSURE: 110 MMHG | HEART RATE: 71 BPM | WEIGHT: 187 LBS | HEIGHT: 61 IN

## 2025-08-28 DIAGNOSIS — R68.81 EARLY SATIETY: ICD-10-CM

## 2025-08-28 DIAGNOSIS — R19.4 CHANGE IN BOWEL HABIT: ICD-10-CM

## 2025-08-28 DIAGNOSIS — R19.4 CHANGE IN BOWEL HABIT: Primary | ICD-10-CM

## 2025-08-28 DIAGNOSIS — K21.9 GASTROESOPHAGEAL REFLUX DISEASE WITHOUT ESOPHAGITIS: ICD-10-CM

## 2025-08-28 PROCEDURE — 99214 OFFICE O/P EST MOD 30 MIN: CPT

## 2025-08-28 PROCEDURE — 74021 RADEX ABDOMEN 3+ VIEWS: CPT

## 2025-08-28 PROCEDURE — 3074F SYST BP LT 130 MM HG: CPT

## 2025-08-28 PROCEDURE — 3008F BODY MASS INDEX DOCD: CPT

## 2025-08-28 PROCEDURE — 3078F DIAST BP <80 MM HG: CPT

## 2025-08-28 RX ORDER — OMEPRAZOLE 40 MG/1
40 CAPSULE, DELAYED RELEASE ORAL
Qty: 60 CAPSULE | Refills: 2 | Status: SHIPPED | OUTPATIENT
Start: 2025-08-28 | End: 2025-11-26

## 2025-08-28 RX ORDER — POLYETHYLENE GLYCOL 3350 17 G/17G
17 POWDER, FOR SOLUTION ORAL DAILY
Qty: 765 G | Refills: 1 | Status: SHIPPED | OUTPATIENT
Start: 2025-08-28

## 2025-08-29 RX ORDER — SIMVASTATIN 20 MG
20 TABLET ORAL NIGHTLY
Qty: 90 TABLET | Refills: 3 | OUTPATIENT
Start: 2025-08-29

## 2025-08-29 RX ORDER — OXYBUTYNIN CHLORIDE 10 MG/1
10 TABLET, EXTENDED RELEASE ORAL DAILY
Qty: 90 TABLET | Refills: 3 | OUTPATIENT
Start: 2025-08-29

## 2025-08-29 RX ORDER — METOPROLOL SUCCINATE 25 MG/1
12.5 TABLET, EXTENDED RELEASE ORAL DAILY
Qty: 45 TABLET | Refills: 3 | OUTPATIENT
Start: 2025-08-29

## 2025-08-30 ENCOUNTER — HOSPITAL ENCOUNTER (OUTPATIENT)
Dept: MAMMOGRAPHY | Facility: HOSPITAL | Age: 50
Discharge: HOME | End: 2025-08-30
Attending: NURSE PRACTITIONER

## 2025-08-30 DIAGNOSIS — Z12.31 ENCOUNTER FOR SCREENING MAMMOGRAM FOR MALIGNANT NEOPLASM OF BREAST: ICD-10-CM

## 2025-08-30 PROCEDURE — 77067 SCR MAMMO BI INCL CAD: CPT | Performed by: NURSE PRACTITIONER

## 2025-08-30 PROCEDURE — 77063 BREAST TOMOSYNTHESIS BI: CPT | Performed by: NURSE PRACTITIONER

## (undated) DIAGNOSIS — R35.1 NOCTURIA: ICD-10-CM

## (undated) DIAGNOSIS — N32.81 DETRUSOR INSTABILITY: ICD-10-CM

## (undated) DIAGNOSIS — N39.41 URGE INCONTINENCE: ICD-10-CM

## (undated) DEVICE — TUBING IRR 16FT CNT WV 3 ASCP

## (undated) DEVICE — BLADE SHVR COOLCUT 13CM 4MM

## (undated) DEVICE — SINGLE-USE SNARE: Brand: CAPTIVATOR™ COLD

## (undated) DEVICE — AMBIENT SUPER MULTIVAC 50 WITH                                    INTEGRATED FINGER SWITCHES IFS: Brand: COBLATION

## (undated) DEVICE — 60 ML SYRINGE REGULAR TIP: Brand: MONOJECT

## (undated) DEVICE — ENCORE® LATEX ACCLAIM SIZE 7.5, STERILE LATEX POWDER-FREE SURGICAL GLOVE: Brand: ENCORE

## (undated) DEVICE — DRAPE PACK CHEST & U BAR

## (undated) DEVICE — ARTHROSCOPY: Brand: MEDLINE INDUSTRIES, INC.

## (undated) DEVICE — CLIP SM INTNL HMCLP TNTLM ESCP

## (undated) DEVICE — KIT ENDO ORCAPOD 160/180/190

## (undated) DEVICE — FLEXIBLE YANKAUER,MEDIUM TIP, NO VACUUM CONTROL: Brand: ARGYLE

## (undated) DEVICE — GAMMEX® NON-LATEX PI ORTHO SIZE 8, STERILE POLYISOPRENE POWDER-FREE SURGICAL GLOVE: Brand: GAMMEX

## (undated) DEVICE — SUTURE SILK 2-0 685H

## (undated) DEVICE — GAMMEX® NON-LATEX PI ORTHO SIZE 7.5, STERILE POLYISOPRENE POWDER-FREE SURGICAL GLOVE: Brand: GAMMEX

## (undated) DEVICE — DRAPE TAPE: Brand: CONVERTORS

## (undated) DEVICE — E-Z CLEAN, NON-STICK, PTFE COATED, ELECTROSURGICAL BLADE ELECTRODE, MODIFIED EXTENDED INSULATION, 2.5 INCH (6.35 CM): Brand: MEGADYNE

## (undated) DEVICE — SUTURE ETHILON 4-0 662G

## (undated) DEVICE — UNDYED BRAIDED (POLYGLACTIN 910), SYNTHETIC ABSORBABLE SUTURE: Brand: COATED VICRYL

## (undated) DEVICE — MEDI-VAC NON-CONDUCTIVE SUCTION TUBING 6MM X 1.8M (6FT.) L: Brand: CARDINAL HEALTH

## (undated) DEVICE — SUCTION CANISTER, 3000CC,SAFELINER: Brand: DEROYAL

## (undated) DEVICE — CONMED SCOPE SAVER BITE BLOCK, 20X27 MM: Brand: SCOPE SAVER

## (undated) DEVICE — ZIMMER® STERILE DISPOSABLE TOURNIQUET CUFF WITH PLC, DUAL PORT, SINGLE BLADDER, 30 IN. (76 CM)

## (undated) DEVICE — SUTURE VICRYL 3-0 SH

## (undated) DEVICE — Device: Brand: DUAL NARE NASAL CANNULAE FEMALE LUER CON 7FT O2 TUBE

## (undated) DEVICE — KIT CLEAN ENDOKIT 1.1OZ GOWNX2

## (undated) DEVICE — YANKAUER,BULB TIP,W/O VENT,RIGID,STERILE: Brand: MEDLINE

## (undated) DEVICE — GIJAW SINGLE-USE BIOPSY FORCEPS WITH NEEDLE: Brand: GIJAW

## (undated) DEVICE — GOWN SURG AERO BLUE PERF XLG

## (undated) DEVICE — MINOR GENERAL: Brand: MEDLINE INDUSTRIES, INC.

## (undated) DEVICE — MEDI-VAC NON-CONDUCTIVE SUCTION TUBING: Brand: CARDINAL HEALTH

## (undated) DEVICE — DRAPE SRG 70X60IN SPLT U IMPRV

## (undated) DEVICE — SOL  .9 3000ML

## (undated) DEVICE — DRAPE C-ARM UNIVERSAL

## (undated) DEVICE — CAPSULE ENDOSCP 11.4X26.2MM BIOCOMPATIBLE

## (undated) DEVICE — STERILE LATEX POWDER-FREE SURGICAL GLOVESWITH NITRILE COATING: Brand: PROTEXIS

## (undated) DEVICE — CHLORAPREP 26ML APPLICATOR

## (undated) NOTE — LETTER
[facility]  Administration of Anesthesia  I, Vianey Cantu agree to be cared for by a physician anesthesiologist alone and/or with a nurse anesthetist, who is specially trained to monitor me and give me medicine to put me to sleep or keep me comfortable during my procedure    I understand that my anesthesiologist and/or anesthetist is not an employee or agent of [facility] or [employer]. He or she works for [facility]    As the patient asking for anesthesia services, I agree to:  Allow the anesthesiologist (anesthesia doctor) to give me medicine and do additional procedures as necessary. Some examples are: Starting or using an “IV” to give me medicine, fluids or blood during my procedure, and having a breathing tube placed to help me breathe when I’m asleep (intubation). In the event that my heart stops working properly, I understand that my anesthesiologist will make every effort to sustain my life, unless otherwise directed by [facility] Do Not Resuscitate documents.  Tell my anesthesia doctor before my procedure:  If I am pregnant.  The last time that I ate or drank.  iii. All of the medicines I take (including prescriptions, herbal supplements, and pills I can buy without a prescription (including street drugs/illegal medications). Failure to inform my anesthesiologist about these medicines may increase my risk of anesthetic complications.  iv.If I am allergic to anything or have had a reaction to anesthesia before.  I understand how the anesthesia medicine will help me (benefits).  I understand that with any type of anesthesia medicine there are risks:  The most common risks are: nausea, vomiting, sore throat, muscle soreness, damage to my eyes, mouth, or teeth (from breathing tube placement).  Rare risks include: remembering what happened during my procedure, allergic reactions to medications, injury to my airway, heart, lungs, vision, nerves, or muscles  and in extremely rare instances death.  My doctor has explained to me other choices available to me for my care (alternatives).  Pregnant Patients (“epidural”):  I understand that the risks of having an epidural (medicine given into my back to help control pain during labor), include itching, low blood pressure, difficulty urinating, headache or slowing of the baby’s heart. Very rare risks include infection, bleeding, seizure, irregular heart rhythms and nerve injury.  Regional Anesthesia (“spinal”, “epidural”, & “nerve blocks”):  I understand that rare but potential complications include headache, bleeding, infection, seizure, irregular heart rhythms, and nerve injury.    _____________________________________________________________________________  Patient (or Representative) Signature/Relationship to Patient  Date   Time    _____________________________________________________________________________   Name (if used)    Language/Organization   Time    _____________________________________________________________________________  Nurse Anesthetist Signature     Date   Time  _____________________________________________________________________________  Anesthesiologist Signature     Date   Time  I have discussed the procedure and information above with the patient (or patient’s representative) and answered their questions. The patient or their representative has agreed to have anesthesia services.    _____________________________________________________________________________  Witness        Date   Time  I have verified that the signature is that of the patient or patient’s representative, and that it was signed before the procedure  Patient Name: Vianey Cantu     : 1975                 Printed: 3/8/2024 at 6:55 AM    Medical Record #: [id number]                                            Page 1 of 1  ----------ANESTHESIA CONSENT----------

## (undated) NOTE — ED AVS SNAPSHOT
Karyn Espinal   MRN: [id number]    Department:  [facility] Emergency Department   Date of Visit:  11/18/2017           Disclosure     Insurance plans vary and the physician(s) referred by the ER may not be covered by your plan.  Please contact yo CARE PHYSICIAN AT ONCE OR RETURN IMMEDIATELY TO THE EMERGENCY DEPARTMENT. If you have been prescribed any medication(s), please fill your prescription right away and begin taking the medication(s) as directed.   If you believe that any of the medications

## (undated) NOTE — MR AVS SNAPSHOT
[address] [address]  [phone]               Thank you for choosing us for your health care visit with Khang Alonso MD.  We are glad to serve you and happy to provide you with this summary of your v your appointment immediately. However, if you are unsure about the requirements for authorization, please wait 5-7 days and then contact your physician's office.  At that time, you will be provided with any authorization numbers or be assured that none are recent med list.                Citalopram Hydrobromide 40 MG Tabs   TAKE 1 TABLET (40 MG TOTAL) BY MOUTH NIGHTLY. Commonly known as:  CeleXA           Clindamycin HCl 300 MG Caps   Take 1 capsule (300 mg total) by mouth 4 (four) times daily.    Commonly

## (undated) NOTE — LETTER
AUTHORIZATION FOR SURGICAL OPERATION OR OTHER PROCEDURE    1. I hereby authorize Dr. Suki Carson and the [facility] staff assigned to my case to perform the following operation and/or procedure at the [facility] Office:    Ultrasound guided bilateral greater trochanteric bursa injection with corticosteroid     2.  My physician has explained the nature and purpose of the operation or other procedure, possible alternative methods of treatment, the risks involved, and the possibility of complication to me.  I acknowledge that no guarantee has been made as to the result that may be obtained.  3.  I recognize that, during the course of this operation, or other procedure, unforseen conditions may necessitate additional or different procedure than those listed above.  I, therefore, further authorize and request that the above named physician, his/her physician assistants or designees perform such procedures as are, in his/her professional opinion, necessary and desirable.  4.  Any tissue or organs removed in the operation or other procedure may be disposed of by and at the discretion of the [facility] staff and [facility].  5.  I understand that in the event of a medical emergency, I will be transported by local paramedics to [facility] or other hospital emergency department.  6.  I certify that I have read and fully understand the above consent to operation and/or other procedure.    7.  I acknowledge that my physician has explained sedation/analgesia administration to me including the risks and benefits.  I consent to the administration of sedation/analgesia as may be necessary or desirable in the judgement of my physician.    Witness signature: ___________________________________________________ Date:  ______/______/_____                    Time:  ________ A.M.  P.M.       Patient Name:    Vianey Cantu  6/28/1975  [id number]       Patient signature:   ___________________________________________________               Statement of Physician  My signature below affirms that prior to the time of the procedure, I have explained to the patient and/or his/her guardian, the risks and benefits involved in the proposed treatment and any reasonable alternative to the proposed treatment.  I have also explained the risks and benefits involved in the refusal of the proposed treatment and have answered the patient's questions.                        Date:  ______/______/_______  Provider                      Signature:  __________________________________________________________       Time:  ___________ AYUE TROTTER

## (undated) NOTE — LETTER
AUTHORIZATION FOR SURGICAL OPERATION OR OTHER PROCEDURE    1.  I hereby authorize Dr. Rodney Smith, and [facility] staff assigned to my case to perform the following operation and/or procedure at the [facility]:    COLPOSCOPY    2.  My ph Relationship to Patient:           []  Parent    Responsible person                          []  Spouse  In case of minor or                    [] Other  _____________   Incompetent name:  __________________________________________________

## (undated) NOTE — LETTER
Date & Time: 4/27/2025, 8:37 AM  Patient: Vianey Cantu  Encounter Provider(s):    Sheridan Bautista APRN       To Whom It May Concern:    Vianey Cantu was seen and treated in our department on 4/27/2025. She can return to work 04/30/2025.    If you have any questions or concerns, please do not hesitate to call.        _____________________________  Physician/APC Signature

## (undated) NOTE — MR AVS SNAPSHOT
[address] [facility] [address]  [phone]               Thank you for choosing us for your health care visit with Yojana Pinzon.  Chen Mcgregor MD.  We are glad to serve you and happy to provide you with this summary of your v Assoc Dx: Iron deficiency anemia due to chronic blood loss [D50.0]                 Follow-up Instructions     Return in about 2 months (around 5/2/2017), or if symptoms worsen or fail to improve.       Scheduling Instructions     Thursday March 02, 2017 Current Medications          This list is accurate as of: 3/2/17 11:16 AM.  Always use your most recent med list.                Citalopram Hydrobromide 40 MG Tabs   TAKE 1 TABLET (40 MG TOTAL) BY MOUTH NIGHTLY.    Commonly known as:  CeleXA           [employer]

## (undated) NOTE — MR AVS SNAPSHOT
[address] [facility] [address]  [phone]               Thank you for choosing us for your health care visit with Louise Carey.  Kaitlin Blue MD.  We are glad to serve you and happy to provide you with this summary of your v Order:  Bariatrics - Internal    Jazmín Davis MD   [address] [address]   Phone:  [phone]         Referral Orders      Normal Orders This Visit    BARIATRICS - INTERNAL [[id number] [address]  Order #:  [id number]         **REFERR No Known Allergies                Today's Vital Signs     BP Pulse Temp    104/74 mmHg 64 97.8 °F (36.6 °C) (Tympanic)    Height Weight BMI    5' 1\" (1.549 m) 176 lb 9.6 oz (80.105 kg) 33.39 kg/m2    Breastfeeding?           No           Current Medicatio

## (undated) NOTE — LETTER
Date: Lorraine 3, 2025      Patient Name: Vianey Cantu      : 1975        Thank you for choosing [facility] as your health care provider. Your physician has deemed the following medical service(s) necessary. However, your insurance plan may not pay for all of your health care and costs and may deny payment for this service. The fact that your insurance plan does not pay for an item or service does not mean you should not receive it. The purpose of this form is to help you make an informed decision about whether or not you want to receive this service(s) that may not be paid for by your insurance plan.    CPT Code Description     Cost     Ultrasound guided bilateral greater trochanteric bursa injection with corticosteroid       I understand that the above mentioned service(s) or supply may not be covered by my insurance company. I agree to be financially responsible for the cost of this service or supply in the event of my insurance denies payment as a non-covered benefit.        ______________________________________________________________________  Signature of Patient or Patient's Representative  Relationship  Date    ______________________________________________________________________  Signature of Witness to signing of form   Printed Name

## (undated) NOTE — LETTER
[facility]  [address], [place], [place]    Authorization for Surgical Operation and Procedure                               I hereby authorize Tyrel Thurston MD, my physician and his/her assistants (if applicable), which may include medical students, residents, and/or fellows, to perform the following surgical operation/ procedure and administer such anesthesia as may be determined necessary by my physician: Operation/Procedure name (s) CAPSULE ENDOSCOPY on Noreen Cantu   2.   I recognize that during the surgical operation/procedure, unforeseen conditions may necessitate additional or different procedures than those listed above.  I, therefore, further authorize and request that the above-named surgeon, assistants, or designees perform such procedures as are, in their judgment, necessary and desirable.    3.   My surgeon/physician has discussed prior to my surgery the potential benefits, risks and side effects of this procedure; the likelihood of achieving goals; and potential problems that might occur during recuperation.  They also discussed reasonable alternatives to the procedure, including risks, benefits, and side effects related to the alternatives and risks related to not receiving this procedure.  I have had all my questions answered and I acknowledge that no guarantee has been made as to the result that may be obtained.    4.   Should the need arise during my operation/procedure, which includes change of level of care prior to discharge, I also consent to the administration of blood and/or blood products.  Further, I understand that despite careful testing and screening of blood or blood products by collecting agencies, I may still be subject to ill effects as a result of receiving a blood transfusion and/or blood products.  The following are some, but not all, of the potential risks that can occur: fever and allergic reactions, hemolytic reactions, transmission of diseases  such as Hepatitis, AIDS and Cytomegalovirus (CMV) and fluid overload.  In the event that I wish to have an autologous transfusion of my own blood, or a directed donor transfusion, I will discuss this with my physician.  Check only if Refusing Blood or Blood Products  I understand refusal of blood or blood products as deemed necessary by my physician may have serious consequences to my condition to include possible death. I hereby assume responsibility for my refusal and release the hospital, its personnel, and my physicians from any responsibility for the consequences of my refusal.    o  Refuse   5.   I authorize the use of any specimen, organs, tissues, body parts or foreign objects that may be removed from my body during the operation/procedure for diagnosis, research or teaching purposes and their subsequent disposal by hospital authorities.  I also authorize the release of specimen test results and/or written reports to my treating physician on the hospital medical staff or other referring or consulting physicians involved in my care, at the discretion of the Pathologist or my treating physician.    6.   I consent to the photographing or videotaping of the operations or procedures to be performed, including appropriate portions of my body for medical, scientific, or educational purposes, provided my identity is not revealed by the pictures or by descriptive texts accompanying them.  If the procedure has been photographed/videotaped, the surgeon will obtain the original picture, image, videotape or CD.  The hospital will not be responsible for storage, release or maintenance of the picture, image, tape or CD.    7.   I consent to the presence of a  or observers in the operating room as deemed necessary by my physician or their designees.    8.   I recognize that in the event my procedure results in extended X-Ray/fluoroscopy time, I may develop a skin reaction.    9. If I have a Do Not Attempt  Resuscitation (DNAR) order in place, that status will be suspended while in the operating room, procedural suite, and during the recovery period unless otherwise explicitly stated by me (or a person authorized to consent on my behalf). The surgeon or my attending physician will determine when the applicable recovery period ends for purposes of reinstating the DNAR order.  10. Patients having a sterilization procedure: I understand that if the procedure is successful the results will be permanent and it will therefore be impossible for me to inseminate, conceive, or bear children.  I also understand that the procedure is intended to result in sterility, although the result has not been guaranteed.   11. I acknowledge that my physician has explained sedation/analgesia administration to me including the risk and benefits I consent to the administration of sedation/analgesia as may be necessary or desirable in the judgment of my physician.    I CERTIFY THAT I HAVE READ AND FULLY UNDERSTAND THE ABOVE CONSENT TO OPERATION and/or OTHER PROCEDURE.     ____________________________________  _________________________________        ______________________________  Signature of Patient    Signature of Responsible Person                Printed Name of Responsible Person                                      ____________________________________  _____________________________                ________________________________  Signature of Witness        Date  Time         Relationship to Patient    STATEMENT OF PHYSICIAN My signature below affirms that prior to the time of the procedure; I have explained to the patient and/or his/her legal representative, the risks and benefits involved in the proposed treatment and any reasonable alternative to the proposed treatment. I have also explained the risks and benefits involved in refusal of the proposed treatment and alternatives to the proposed treatment and have answered the patient's  questions. If I have a significant financial interest in a co-management agreement or a significant financial interest in any product or implant, or other significant relationship used in this procedure/surgery, I have disclosed this and had a discussion with my patient.     _____________________________________________________              _____________________________  (Signature of Physician)                                                                                         (Date)                                   (Time)  Patient Name: Vianey Cantu      : 1975      Printed: 2024     Medical Record #: [id number]                                      Page 1 of 1

## (undated) NOTE — MR AVS SNAPSHOT
[address] [facility] [place] [id number] [phone]               Thank you for choosing us for your health care visit with Yi Ulrich.  Melissa Mg MD.  We are glad to serve you and happy to provide you with this summary of your v .          Reason for Today's Visit     Lesion           Medical Issues Discussed Today     Iron deficiency anemia due to chronic blood loss    -  Primary    Mixed hyperlipidemia        Abscess        Acute bilateral thoracic back pain Cyclobenzaprine HCl 10 MG Tabs   Take 1 tablet (10 mg total) by mouth nightly. Commonly known as:  cyclobenzaprine           Diclofenac Sodium 50 MG Tbec   Take 1 tablet (50 mg total) by mouth 2 (two) times daily.    Commonly known as:  VOLTAREN

## (undated) NOTE — MR AVS SNAPSHOT
[address] [facility] [address]  [phone]               Thank you for choosing us for your health care visit with Jayme Castleman.  Carlos Jimenez MD.  We are glad to serve you and happy to provide you with this summary of your v AMIN (dyspnea on exertion)    Hyperlipidemia    Chronic midline thoracic back pain          Instructions and Information about Your Health    ASSESSMENT/PLAN:   Iron deficiency anemia due to chronic blood loss  (primary encounter diagnosis) Stable.      Mix Omeprazole 40 MG Cpdr   Take 1 capsule (40 mg total) by mouth daily.                 Where to Get Your Medications      These medications were sent to [address] [place] [address], [place]  [phone], [address]

## (undated) NOTE — LETTER
12/12/2019              Tod Perry        [address]         Dear Elisabeth Kruse,    After your last pap, Dr. Lavell Shannon MD recommended that you have a repeat pap  done in 6 months. The repeat pap is due in February 2020. Your healthcare needs are important to us. Please call the office [phone] as soon as possible to schedule this appointment. Sincerely,    Tavo Morejon.  Shirin Cagle MD  [place] , [address], [address] [address] [phone]

## (undated) NOTE — MR AVS SNAPSHOT
[facility]  [address] [place] [address] [phone]               Thank you for choosing us for your health care visit with Johanna Lopez.  Torey Acuna MD.  We are glad to serve you and happy to provide you with this summary of your vis Take 1 tablet (150 mg total) by mouth once. Commonly known as:  DIFLUCAN           Mirabegron ER 25 MG Tb24   Take 25 mg by mouth daily.    Commonly known as:  Michael Chiang                Where to Get Your Medications      These medications were sent to [facility]

## (undated) NOTE — LETTER
5/30/2020              Dorcas Midlothian        [address]         Dear Tye Saenz,    After your last pap, Dr. Indu Dunlap MD recommended that you have a repeat pap  done in 6 months. The repeat pap was due in February 2020. Your healthcare needs are important to us. Please call the office [phone] as soon as possible to schedule this appointment. Sincerely,    Reuben Stephens.  Mini Diane MD  [facility], [address] [place] [address]   [address]  [address] [facility] [address] [phone]

## (undated) NOTE — LETTER
[address]  [facility]: [phone]   Consent to Procedure/Sedation    Date: __11/14/2019_____    Time: ___8:42 AM ___    1.  I authorize the performance upon Tom Clos the follow Signature of person authorized to consent for patient: Relationship to patient:  ___________________________    ___________________    Witness: ____________________     Date: ______________    Printed: 11/14/2019   8:42 AM    Patient Name: Karyn Espinal

## (undated) NOTE — MR AVS SNAPSHOT
[facility]  [address] [place] [address]  [phone]               Thank you for choosing us for your health care visit with Gin Capone.  Osmar Scott MD.  We are glad to serve you and happy to provide you with this summary of your vis For medical emergencies, dial 911. Educational Information     Healthy Diet and Regular Exercise  [employer] for making healthy food choices  -   Enjoy your food, but eat less. Fully enjoy your food when eating.    Don’t

## (undated) NOTE — ED AVS SNAPSHOT
Tasha Montemayor   MRN: [id number]    Department:  [facility] Emergency Department   Date of Visit:  9/22/2018           Disclosure     Insurance plans vary and the physician(s) referred by the ER may not be covered by your plan.  Please contact you CARE PHYSICIAN AT ONCE OR RETURN IMMEDIATELY TO THE EMERGENCY DEPARTMENT. If you have been prescribed any medication(s), please fill your prescription right away and begin taking the medication(s) as directed.   If you believe that any of the medications

## (undated) NOTE — LETTER
[facility]  [address], [place], [place]  Authorization for Surgical Operation and Procedure                                                                                           I hereby authorize Tyrel Thurston MD, my physician and his/her assistants (if applicable), which may include medical students, residents, and/or fellows, to perform the following surgical operation/ procedure and administer such anesthesia as may be determined necessary by my physician: Operation/Procedure name (s) COLONOSCOPY / ESOPHAGOGASTRODUODENOSCOPY on [facility] Cantu   2.   I recognize that during the surgical operation/procedure, unforeseen conditions may necessitate additional or different procedures than those listed above.  I, therefore, further authorize and request that the above-named surgeon, assistants, or designees perform such procedures as are, in their judgment, necessary and desirable.    3.   My surgeon/physician has discussed prior to my surgery the potential benefits, risks and side effects of this procedure; the likelihood of achieving goals; and potential problems that might occur during recuperation.  They also discussed reasonable alternatives to the procedure, including risks, benefits, and side effects related to the alternatives and risks related to not receiving this procedure.  I have had all my questions answered and I acknowledge that no guarantee has been made as to the result that may be obtained.    4.   Should the need arise during my operation/procedure, which includes change of level of care prior to discharge, I also consent to the administration of blood and/or blood products.  Further, I understand that despite careful testing and screening of blood or blood products by collecting agencies, I may still be subject to ill effects as a result of receiving a blood transfusion and/or blood products.  The following are some, but not all, of the potential risks that can  occur: fever and allergic reactions, hemolytic reactions, transmission of diseases such as Hepatitis, AIDS and Cytomegalovirus (CMV) and fluid overload.  In the event that I wish to have an autologous transfusion of my own blood, or a directed donor transfusion, I will discuss this with my physician.  Check only if Refusing Blood or Blood Products  I understand refusal of blood or blood products as deemed necessary by my physician may have serious consequences to my condition to include possible death. I hereby assume responsibility for my refusal and release the hospital, its personnel, and my physicians from any responsibility for the consequences of my refusal.    o  Refuse   5.   I authorize the use of any specimen, organs, tissues, body parts or foreign objects that may be removed from my body during the operation/procedure for diagnosis, research or teaching purposes and their subsequent disposal by hospital authorities.  I also authorize the release of specimen test results and/or written reports to my treating physician on the hospital medical staff or other referring or consulting physicians involved in my care, at the discretion of the Pathologist or my treating physician.    6.   I consent to the photographing or videotaping of the operations or procedures to be performed, including appropriate portions of my body for medical, scientific, or educational purposes, provided my identity is not revealed by the pictures or by descriptive texts accompanying them.  If the procedure has been photographed/videotaped, the surgeon will obtain the original picture, image, videotape or CD.  The hospital will not be responsible for storage, release or maintenance of the picture, image, tape or CD.    7.   I consent to the presence of a  or observers in the operating room as deemed necessary by my physician or their designees.    8.   I recognize that in the event my procedure results in extended  X-Ray/fluoroscopy time, I may develop a skin reaction.    9. If I have a Do Not Attempt Resuscitation (DNAR) order in place, that status will be suspended while in the operating room, procedural suite, and during the recovery period unless otherwise explicitly stated by me (or a person authorized to consent on my behalf). The surgeon or my attending physician will determine when the applicable recovery period ends for purposes of reinstating the DNAR order.  10. Patients having a sterilization procedure: I understand that if the procedure is successful the results will be permanent and it will therefore be impossible for me to inseminate, conceive, or bear children.  I also understand that the procedure is intended to result in sterility, although the result has not been guaranteed.   11. I acknowledge that my physician has explained sedation/analgesia administration to me including the risk and benefits I consent to the administration of sedation/analgesia as may be necessary or desirable in the judgment of my physician.    I CERTIFY THAT I HAVE READ AND FULLY UNDERSTAND THE ABOVE CONSENT TO OPERATION and/or OTHER PROCEDURE.     _________________________________________ _________________________________     ___________________________________  Signature of Patient     Signature of Responsible Person                   Printed Name of Responsible Person                              _________________________________________ ______________________________        ___________________________________  Signature of Witness         Date  Time         Relationship to Patient    STATEMENT OF PHYSICIAN My signature below affirms that prior to the time of the procedure; I have explained to the patient and/or his/her legal representative, the risks and benefits involved in the proposed treatment and any reasonable alternative to the proposed treatment. I have also explained the risks and benefits involved in refusal of the  proposed treatment and alternatives to the proposed treatment and have answered the patient's questions. If I have a significant financial interest in a co-management agreement or a significant financial interest in any product or implant, or other significant relationship used in this procedure/surgery, I have disclosed this and had a discussion with my patient.     _______________________________________________________________ _____________________________  (Signature of Physician)                                                                                         (Date)                                   (Time)  Patient Name: Vianey Cantu    : 1975   Printed: 3/8/2024      Medical Record #: [id number]                                              Page 1 of 1

## (undated) NOTE — LETTER
4/16/2024              Noreen Cantu        [address]        [facility] [address]         Dear Noreen,    Our records indicate that the tests ordered for you by Tyrel Thurston MD  have not been done.    Helicobacter Pylori Breath Test, Adult (Order #[id number]) on 3/14/24       If you have, in fact, already completed the tests or you do not wish to have the tests done, please contact our office at THE NUMBER LISTED BELOW.  Otherwise, please proceed with the testing.         Sincerely,    Tyrel Thurston MD  [address] [address]  [phone]

## (undated) NOTE — Clinical Note
Angella Khan - I saw Jhoana Cuevas today with mixed UI. I've recommended bladder testing. I will work to manage her sx. I appreciate the opportunity to participate in her care.  Thanks, [employer]